# Patient Record
Sex: FEMALE | Race: WHITE | Employment: OTHER
[De-identification: names, ages, dates, MRNs, and addresses within clinical notes are randomized per-mention and may not be internally consistent; named-entity substitution may affect disease eponyms.]

---

## 2016-12-30 LAB
BASOPHILS ABSOLUTE: NORMAL /ΜL
BASOPHILS RELATIVE PERCENT: NORMAL %
EOSINOPHILS ABSOLUTE: NORMAL /ΜL
EOSINOPHILS RELATIVE PERCENT: NORMAL %
HCT VFR BLD CALC: 39.3 % (ref 36–46)
HEMOGLOBIN: 13 G/DL (ref 12–16)
LYMPHOCYTES ABSOLUTE: NORMAL /ΜL
LYMPHOCYTES RELATIVE PERCENT: NORMAL %
MCH RBC QN AUTO: NORMAL PG
MCHC RBC AUTO-ENTMCNC: NORMAL G/DL
MCV RBC AUTO: NORMAL FL
MONOCYTES ABSOLUTE: NORMAL /ΜL
MONOCYTES RELATIVE PERCENT: NORMAL %
NEUTROPHILS ABSOLUTE: NORMAL /ΜL
NEUTROPHILS RELATIVE PERCENT: NORMAL %
PDW BLD-RTO: NORMAL %
PLATELET # BLD: 228 K/ΜL
PMV BLD AUTO: NORMAL FL
RBC # BLD: 4.39 10^6/ΜL
WBC # BLD: 7.4 10^3/ML

## 2017-01-05 RX ORDER — LEVOTHYROXINE SODIUM 0.05 MG/1
TABLET ORAL
Qty: 30 TABLET | Refills: 5 | Status: SHIPPED | OUTPATIENT
Start: 2017-01-05 | End: 2017-01-23 | Stop reason: SDUPTHER

## 2017-01-23 DIAGNOSIS — R60.9 EDEMA, UNSPECIFIED TYPE: ICD-10-CM

## 2017-01-23 RX ORDER — HYDROCHLOROTHIAZIDE 25 MG/1
TABLET ORAL
Qty: 90 TABLET | Refills: 3 | Status: SHIPPED | OUTPATIENT
Start: 2017-01-23 | End: 2018-02-20 | Stop reason: SDUPTHER

## 2017-01-23 RX ORDER — OLMESARTAN MEDOXOMIL 40 MG/1
40 TABLET ORAL DAILY
Qty: 90 TABLET | Refills: 3 | Status: SHIPPED | OUTPATIENT
Start: 2017-01-23 | End: 2018-03-27 | Stop reason: SDUPTHER

## 2017-01-23 RX ORDER — LEVOTHYROXINE SODIUM 0.05 MG/1
50 TABLET ORAL DAILY
Qty: 90 TABLET | Refills: 3 | Status: SHIPPED | OUTPATIENT
Start: 2017-01-23 | End: 2018-03-27 | Stop reason: SDUPTHER

## 2017-02-03 RX ORDER — AZITHROMYCIN 250 MG/1
TABLET, FILM COATED ORAL
Qty: 1 PACKET | Refills: 0 | Status: SHIPPED | OUTPATIENT
Start: 2017-02-03 | End: 2017-02-13

## 2017-02-14 ENCOUNTER — TELEPHONE (OUTPATIENT)
Dept: INTERNAL MEDICINE | Facility: CLINIC | Age: 82
End: 2017-02-14

## 2017-02-15 ENCOUNTER — OFFICE VISIT (OUTPATIENT)
Dept: INTERNAL MEDICINE | Facility: CLINIC | Age: 82
End: 2017-02-15

## 2017-02-15 VITALS
HEIGHT: 58 IN | DIASTOLIC BLOOD PRESSURE: 82 MMHG | WEIGHT: 149 LBS | BODY MASS INDEX: 31.28 KG/M2 | SYSTOLIC BLOOD PRESSURE: 150 MMHG

## 2017-02-15 DIAGNOSIS — M05.79 RHEUMATOID ARTHRITIS INVOLVING MULTIPLE SITES WITH POSITIVE RHEUMATOID FACTOR (HCC): ICD-10-CM

## 2017-02-15 DIAGNOSIS — M25.552 PAIN OF LEFT HIP JOINT: Primary | ICD-10-CM

## 2017-02-15 DIAGNOSIS — I10 BENIGN ESSENTIAL HTN: ICD-10-CM

## 2017-02-15 PROCEDURE — 1123F ACP DISCUSS/DSCN MKR DOCD: CPT | Performed by: INTERNAL MEDICINE

## 2017-02-15 PROCEDURE — G8484 FLU IMMUNIZE NO ADMIN: HCPCS | Performed by: INTERNAL MEDICINE

## 2017-02-15 PROCEDURE — 1036F TOBACCO NON-USER: CPT | Performed by: INTERNAL MEDICINE

## 2017-02-15 PROCEDURE — G8417 CALC BMI ABV UP PARAM F/U: HCPCS | Performed by: INTERNAL MEDICINE

## 2017-02-15 PROCEDURE — 1090F PRES/ABSN URINE INCON ASSESS: CPT | Performed by: INTERNAL MEDICINE

## 2017-02-15 PROCEDURE — 4040F PNEUMOC VAC/ADMIN/RCVD: CPT | Performed by: INTERNAL MEDICINE

## 2017-02-15 PROCEDURE — G8399 PT W/DXA RESULTS DOCUMENT: HCPCS | Performed by: INTERNAL MEDICINE

## 2017-02-15 PROCEDURE — G8427 DOCREV CUR MEDS BY ELIG CLIN: HCPCS | Performed by: INTERNAL MEDICINE

## 2017-02-15 PROCEDURE — 99213 OFFICE O/P EST LOW 20 MIN: CPT | Performed by: INTERNAL MEDICINE

## 2017-02-26 PROBLEM — I10 BENIGN ESSENTIAL HTN: Status: ACTIVE | Noted: 2017-02-26

## 2017-03-13 ENCOUNTER — HOSPITAL ENCOUNTER (OUTPATIENT)
Age: 82
Discharge: HOME | End: 2017-03-13
Payer: MEDICARE

## 2017-03-13 ENCOUNTER — HOSPITAL ENCOUNTER (OUTPATIENT)
Dept: GENERAL RADIOLOGY | Age: 82
Discharge: HOME | End: 2017-03-13
Payer: MEDICARE

## 2017-03-13 DIAGNOSIS — M25.552 PAIN OF LEFT HIP JOINT: ICD-10-CM

## 2017-03-13 PROCEDURE — 73502 X-RAY EXAM HIP UNI 2-3 VIEWS: CPT

## 2017-03-15 ENCOUNTER — OFFICE VISIT (OUTPATIENT)
Dept: INTERNAL MEDICINE CLINIC | Age: 82
End: 2017-03-15
Payer: MEDICARE

## 2017-03-15 VITALS
HEIGHT: 58 IN | SYSTOLIC BLOOD PRESSURE: 132 MMHG | DIASTOLIC BLOOD PRESSURE: 82 MMHG | WEIGHT: 147 LBS | BODY MASS INDEX: 30.86 KG/M2

## 2017-03-15 DIAGNOSIS — T45.1X5A METHOTREXATE LUNG: ICD-10-CM

## 2017-03-15 DIAGNOSIS — E78.2 MIXED HYPERLIPIDEMIA: Primary | ICD-10-CM

## 2017-03-15 DIAGNOSIS — J98.4 METHOTREXATE LUNG: ICD-10-CM

## 2017-03-15 DIAGNOSIS — R05.9 COUGH: ICD-10-CM

## 2017-03-15 DIAGNOSIS — M05.79 RHEUMATOID ARTHRITIS INVOLVING MULTIPLE SITES WITH POSITIVE RHEUMATOID FACTOR (HCC): ICD-10-CM

## 2017-03-15 PROCEDURE — G8417 CALC BMI ABV UP PARAM F/U: HCPCS | Performed by: INTERNAL MEDICINE

## 2017-03-15 PROCEDURE — 4040F PNEUMOC VAC/ADMIN/RCVD: CPT | Performed by: INTERNAL MEDICINE

## 2017-03-15 PROCEDURE — G8484 FLU IMMUNIZE NO ADMIN: HCPCS | Performed by: INTERNAL MEDICINE

## 2017-03-15 PROCEDURE — G8399 PT W/DXA RESULTS DOCUMENT: HCPCS | Performed by: INTERNAL MEDICINE

## 2017-03-15 PROCEDURE — G8427 DOCREV CUR MEDS BY ELIG CLIN: HCPCS | Performed by: INTERNAL MEDICINE

## 2017-03-15 PROCEDURE — 1123F ACP DISCUSS/DSCN MKR DOCD: CPT | Performed by: INTERNAL MEDICINE

## 2017-03-15 PROCEDURE — 1036F TOBACCO NON-USER: CPT | Performed by: INTERNAL MEDICINE

## 2017-03-15 PROCEDURE — 1090F PRES/ABSN URINE INCON ASSESS: CPT | Performed by: INTERNAL MEDICINE

## 2017-03-15 PROCEDURE — 99213 OFFICE O/P EST LOW 20 MIN: CPT | Performed by: INTERNAL MEDICINE

## 2017-03-15 RX ORDER — ERGOCALCIFEROL 1.25 MG/1
CAPSULE ORAL
Refills: 0 | Status: ON HOLD | COMMUNITY
Start: 2017-03-02 | End: 2019-06-04 | Stop reason: HOSPADM

## 2017-03-15 RX ORDER — PROMETHAZINE HYDROCHLORIDE AND CODEINE PHOSPHATE 6.25; 1 MG/5ML; MG/5ML
SYRUP ORAL
Qty: 200 ML | Refills: 0 | Status: SHIPPED | OUTPATIENT
Start: 2017-03-15 | End: 2017-08-01 | Stop reason: SDUPTHER

## 2017-03-15 ASSESSMENT — PATIENT HEALTH QUESTIONNAIRE - PHQ9
1. LITTLE INTEREST OR PLEASURE IN DOING THINGS: 0
SUM OF ALL RESPONSES TO PHQ QUESTIONS 1-9: 0
2. FEELING DOWN, DEPRESSED OR HOPELESS: 0
SUM OF ALL RESPONSES TO PHQ9 QUESTIONS 1 & 2: 0

## 2017-06-06 ENCOUNTER — HOSPITAL ENCOUNTER (OUTPATIENT)
Dept: WOMENS IMAGING | Age: 82
Discharge: HOME | End: 2017-06-06
Payer: MEDICARE

## 2017-06-06 DIAGNOSIS — N64.4 PAINFUL BREASTS: ICD-10-CM

## 2017-06-06 PROCEDURE — 76642 ULTRASOUND BREAST LIMITED: CPT

## 2017-06-06 PROCEDURE — G0279 TOMOSYNTHESIS, MAMMO: HCPCS

## 2017-06-28 ENCOUNTER — OFFICE VISIT (OUTPATIENT)
Dept: INTERNAL MEDICINE CLINIC | Age: 82
End: 2017-06-28
Payer: MEDICARE

## 2017-06-28 VITALS
SYSTOLIC BLOOD PRESSURE: 124 MMHG | BODY MASS INDEX: 30.44 KG/M2 | DIASTOLIC BLOOD PRESSURE: 82 MMHG | HEIGHT: 58 IN | WEIGHT: 145 LBS

## 2017-06-28 DIAGNOSIS — M05.79 RHEUMATOID ARTHRITIS INVOLVING MULTIPLE SITES WITH POSITIVE RHEUMATOID FACTOR (HCC): ICD-10-CM

## 2017-06-28 DIAGNOSIS — I10 BENIGN ESSENTIAL HTN: ICD-10-CM

## 2017-06-28 DIAGNOSIS — E24.9 CUSHING SYNDROME (HCC): Primary | ICD-10-CM

## 2017-06-28 PROCEDURE — 1123F ACP DISCUSS/DSCN MKR DOCD: CPT | Performed by: INTERNAL MEDICINE

## 2017-06-28 PROCEDURE — G8399 PT W/DXA RESULTS DOCUMENT: HCPCS | Performed by: INTERNAL MEDICINE

## 2017-06-28 PROCEDURE — 1036F TOBACCO NON-USER: CPT | Performed by: INTERNAL MEDICINE

## 2017-06-28 PROCEDURE — 1090F PRES/ABSN URINE INCON ASSESS: CPT | Performed by: INTERNAL MEDICINE

## 2017-06-28 PROCEDURE — 4040F PNEUMOC VAC/ADMIN/RCVD: CPT | Performed by: INTERNAL MEDICINE

## 2017-06-28 PROCEDURE — 99213 OFFICE O/P EST LOW 20 MIN: CPT | Performed by: INTERNAL MEDICINE

## 2017-06-28 PROCEDURE — G8417 CALC BMI ABV UP PARAM F/U: HCPCS | Performed by: INTERNAL MEDICINE

## 2017-06-28 PROCEDURE — G8427 DOCREV CUR MEDS BY ELIG CLIN: HCPCS | Performed by: INTERNAL MEDICINE

## 2017-08-01 ENCOUNTER — OFFICE VISIT (OUTPATIENT)
Dept: INTERNAL MEDICINE CLINIC | Age: 82
End: 2017-08-01
Payer: MEDICARE

## 2017-08-01 VITALS
SYSTOLIC BLOOD PRESSURE: 119 MMHG | DIASTOLIC BLOOD PRESSURE: 72 MMHG | BODY MASS INDEX: 30.44 KG/M2 | HEIGHT: 58 IN | WEIGHT: 145 LBS

## 2017-08-01 DIAGNOSIS — Z09 FOLLOW UP: Primary | ICD-10-CM

## 2017-08-01 DIAGNOSIS — M17.11 ARTHRITIS OF RIGHT KNEE: ICD-10-CM

## 2017-08-01 DIAGNOSIS — R05.9 COUGH: ICD-10-CM

## 2017-08-01 PROCEDURE — G8399 PT W/DXA RESULTS DOCUMENT: HCPCS | Performed by: INTERNAL MEDICINE

## 2017-08-01 PROCEDURE — 1036F TOBACCO NON-USER: CPT | Performed by: INTERNAL MEDICINE

## 2017-08-01 PROCEDURE — 1090F PRES/ABSN URINE INCON ASSESS: CPT | Performed by: INTERNAL MEDICINE

## 2017-08-01 PROCEDURE — 1123F ACP DISCUSS/DSCN MKR DOCD: CPT | Performed by: INTERNAL MEDICINE

## 2017-08-01 PROCEDURE — G8427 DOCREV CUR MEDS BY ELIG CLIN: HCPCS | Performed by: INTERNAL MEDICINE

## 2017-08-01 PROCEDURE — G8417 CALC BMI ABV UP PARAM F/U: HCPCS | Performed by: INTERNAL MEDICINE

## 2017-08-01 PROCEDURE — 4040F PNEUMOC VAC/ADMIN/RCVD: CPT | Performed by: INTERNAL MEDICINE

## 2017-08-01 PROCEDURE — 20610 DRAIN/INJ JOINT/BURSA W/O US: CPT | Performed by: INTERNAL MEDICINE

## 2017-08-01 RX ORDER — BENZONATATE 100 MG/1
CAPSULE ORAL
Refills: 0 | COMMUNITY
Start: 2017-06-15 | End: 2018-03-05 | Stop reason: ALTCHOICE

## 2017-08-01 RX ORDER — PROMETHAZINE HYDROCHLORIDE AND CODEINE PHOSPHATE 6.25; 1 MG/5ML; MG/5ML
SYRUP ORAL
Qty: 200 ML | Refills: 0 | Status: SHIPPED | OUTPATIENT
Start: 2017-08-01 | End: 2018-03-05

## 2017-08-01 ASSESSMENT — PATIENT HEALTH QUESTIONNAIRE - PHQ9
1. LITTLE INTEREST OR PLEASURE IN DOING THINGS: 0
SUM OF ALL RESPONSES TO PHQ QUESTIONS 1-9: 0
SUM OF ALL RESPONSES TO PHQ9 QUESTIONS 1 & 2: 0
2. FEELING DOWN, DEPRESSED OR HOPELESS: 0

## 2017-08-13 RX ORDER — METHYLPREDNISOLONE ACETATE 80 MG/ML
40 INJECTION, SUSPENSION INTRA-ARTICULAR; INTRALESIONAL; INTRAMUSCULAR; SOFT TISSUE ONCE
Status: COMPLETED | OUTPATIENT
Start: 2017-08-13 | End: 2017-08-13

## 2017-08-13 RX ADMIN — METHYLPREDNISOLONE ACETATE 40 MG: 80 INJECTION, SUSPENSION INTRA-ARTICULAR; INTRALESIONAL; INTRAMUSCULAR; SOFT TISSUE at 22:58

## 2017-10-18 ENCOUNTER — OFFICE VISIT (OUTPATIENT)
Dept: INTERNAL MEDICINE CLINIC | Age: 82
End: 2017-10-18
Payer: MEDICARE

## 2017-10-18 VITALS
HEIGHT: 58 IN | BODY MASS INDEX: 29.6 KG/M2 | DIASTOLIC BLOOD PRESSURE: 82 MMHG | WEIGHT: 141 LBS | SYSTOLIC BLOOD PRESSURE: 126 MMHG

## 2017-10-18 DIAGNOSIS — E24.9 CUSHING SYNDROME (HCC): Primary | ICD-10-CM

## 2017-10-18 PROCEDURE — G8399 PT W/DXA RESULTS DOCUMENT: HCPCS | Performed by: INTERNAL MEDICINE

## 2017-10-18 PROCEDURE — 99213 OFFICE O/P EST LOW 20 MIN: CPT | Performed by: INTERNAL MEDICINE

## 2017-10-18 PROCEDURE — G8417 CALC BMI ABV UP PARAM F/U: HCPCS | Performed by: INTERNAL MEDICINE

## 2017-10-18 PROCEDURE — 1090F PRES/ABSN URINE INCON ASSESS: CPT | Performed by: INTERNAL MEDICINE

## 2017-10-18 PROCEDURE — 1036F TOBACCO NON-USER: CPT | Performed by: INTERNAL MEDICINE

## 2017-10-18 PROCEDURE — G8427 DOCREV CUR MEDS BY ELIG CLIN: HCPCS | Performed by: INTERNAL MEDICINE

## 2017-10-18 PROCEDURE — 4040F PNEUMOC VAC/ADMIN/RCVD: CPT | Performed by: INTERNAL MEDICINE

## 2017-10-18 PROCEDURE — G8484 FLU IMMUNIZE NO ADMIN: HCPCS | Performed by: INTERNAL MEDICINE

## 2017-10-18 PROCEDURE — 1123F ACP DISCUSS/DSCN MKR DOCD: CPT | Performed by: INTERNAL MEDICINE

## 2017-10-18 RX ORDER — HYDROCODONE POLISTIREX AND CHLORPHENIRAMINE POLISTIREX 10; 8 MG/5ML; MG/5ML
5 SUSPENSION, EXTENDED RELEASE ORAL PRN
Qty: 230 ML | Refills: 0 | Status: SHIPPED | OUTPATIENT
Start: 2017-10-18 | End: 2018-06-04 | Stop reason: SDUPTHER

## 2017-10-18 NOTE — PROGRESS NOTES
Subjective:      Patient ID: Pat Anne is a 80 y.o. female. Chronic Disease Visit Information    BP Readings from Last 3 Encounters:   08/01/17 119/72   06/28/17 124/82   03/15/17 132/82          BUN (mg/dL)   Date Value   11/21/2013 23 (H)     CREATININE (no units)   Date Value   01/02/2015 0.6     Glucose (mg/dL)   Date Value   10/11/2011 86            Have you changed or started any medications since your last visit including any over-the-counter medicines, vitamins, or herbal medicines? no   Are you having any side effects from any of your medications? -  no  Have you stopped taking any of your medications? Is so, why? -  no    Have you seen any other physician or provider since your last visit? No  Have you had any other diagnostic tests since your last visit? No  Have you been seen in the emergency room and/or had an admission to a hospital since we last saw you? No  Have you had your annual diabetic retinal (eye) exam? No  Have you had your routine dental cleaning in the past 6 months? no    Have you activated your [employer] account? If not, what are your barriers? No: declined     Patient Care Team:  Juno Bertrand MD as PCP - General (Internal Medicine)         Medical History Review  Past Medical, Family, and Social History reviewed and does contribute to the patient presenting condition    Health Maintenance   Topic Date Due    DTaP/Tdap/Td vaccine (1 - Tdap) 01/02/1954    Zostavax vaccine  01/02/1995    Flu vaccine (1) 09/01/2017    DEXA (modify frequency per FRAX score)  Completed    Pneumococcal low/med risk  Completed       HPI    Review of Systems    Objective:   Physical Exam    Assessment:      No diagnosis found.         Plan:

## 2017-11-14 ENCOUNTER — HOSPITAL ENCOUNTER (OUTPATIENT)
Age: 82
Discharge: HOME | End: 2017-11-14
Payer: MEDICARE

## 2017-11-14 ENCOUNTER — HOSPITAL ENCOUNTER (OUTPATIENT)
Dept: GENERAL RADIOLOGY | Age: 82
Discharge: HOME | End: 2017-11-14
Payer: MEDICARE

## 2017-11-14 DIAGNOSIS — J90 PLEURAL EFFUSION: ICD-10-CM

## 2017-11-14 PROCEDURE — 71020 XR CHEST STANDARD TWO VW: CPT

## 2017-12-04 ENCOUNTER — HOSPITAL ENCOUNTER (OUTPATIENT)
Dept: CT IMAGING | Age: 82
Discharge: HOME | End: 2017-12-04
Payer: MEDICARE

## 2017-12-04 DIAGNOSIS — J84.10 PULMONARY FIBROSIS (HCC): ICD-10-CM

## 2017-12-04 PROCEDURE — 71250 CT THORAX DX C-: CPT

## 2017-12-07 ENCOUNTER — OFFICE VISIT (OUTPATIENT)
Dept: INTERNAL MEDICINE CLINIC | Age: 82
End: 2017-12-07
Payer: MEDICARE

## 2017-12-07 VITALS
HEIGHT: 58 IN | WEIGHT: 144 LBS | BODY MASS INDEX: 30.23 KG/M2 | SYSTOLIC BLOOD PRESSURE: 150 MMHG | DIASTOLIC BLOOD PRESSURE: 90 MMHG

## 2017-12-07 DIAGNOSIS — I10 BENIGN ESSENTIAL HTN: ICD-10-CM

## 2017-12-07 DIAGNOSIS — J98.4 METHOTREXATE LUNG: ICD-10-CM

## 2017-12-07 DIAGNOSIS — T45.1X5A METHOTREXATE LUNG: ICD-10-CM

## 2017-12-07 DIAGNOSIS — L08.9 INFECTED SEBACEOUS CYST: Primary | ICD-10-CM

## 2017-12-07 DIAGNOSIS — L72.3 INFECTED SEBACEOUS CYST: Primary | ICD-10-CM

## 2017-12-07 PROCEDURE — G8399 PT W/DXA RESULTS DOCUMENT: HCPCS | Performed by: INTERNAL MEDICINE

## 2017-12-07 PROCEDURE — G8427 DOCREV CUR MEDS BY ELIG CLIN: HCPCS | Performed by: INTERNAL MEDICINE

## 2017-12-07 PROCEDURE — 1036F TOBACCO NON-USER: CPT | Performed by: INTERNAL MEDICINE

## 2017-12-07 PROCEDURE — 4040F PNEUMOC VAC/ADMIN/RCVD: CPT | Performed by: INTERNAL MEDICINE

## 2017-12-07 PROCEDURE — G8417 CALC BMI ABV UP PARAM F/U: HCPCS | Performed by: INTERNAL MEDICINE

## 2017-12-07 PROCEDURE — 1123F ACP DISCUSS/DSCN MKR DOCD: CPT | Performed by: INTERNAL MEDICINE

## 2017-12-07 PROCEDURE — 99213 OFFICE O/P EST LOW 20 MIN: CPT | Performed by: INTERNAL MEDICINE

## 2017-12-07 PROCEDURE — 1090F PRES/ABSN URINE INCON ASSESS: CPT | Performed by: INTERNAL MEDICINE

## 2017-12-07 PROCEDURE — G8484 FLU IMMUNIZE NO ADMIN: HCPCS | Performed by: INTERNAL MEDICINE

## 2017-12-07 RX ORDER — CLINDAMYCIN HYDROCHLORIDE 300 MG/1
300 CAPSULE ORAL 3 TIMES DAILY
Qty: 21 CAPSULE | Refills: 0 | Status: SHIPPED | OUTPATIENT
Start: 2017-12-07 | End: 2018-09-13 | Stop reason: SDUPTHER

## 2017-12-07 RX ORDER — FUROSEMIDE 20 MG/1
20 TABLET ORAL DAILY
Qty: 60 TABLET | Refills: 3 | Status: SHIPPED | OUTPATIENT
Start: 2017-12-07 | End: 2019-03-25 | Stop reason: SDUPTHER

## 2017-12-07 NOTE — PROGRESS NOTES
(carpal tunnel syndrome)     Esophageal reflux     Headache(784.0)     Hyperlipidemia     Hypertension     Hypothyroidism     Osteoarthritis     Osteoporosis     Rheumatoid arthritis(714.0)      Social History   Substance Use Topics    Smoking status: Never Smoker    Smokeless tobacco: Never Used    Alcohol use No       ROS  [facility] no chest pain no sob no orthopnea  Resp no cough   General no weight loss no fatigue no fever      Objective:   Physical Exam    Blood pressure (!) 150/90, height 4' 9.87\" (1.47 m), weight 144 lb (65.3 kg), not currently breastfeeding. General Appearance NAD conversant  Neck FROM supple no JVD  Lungs normal effort Clear to auscultation  Cardio regular rhythm no murmur  Abdomen Soft nontender no HSM  Ext no edema no cyanosis no clubbing   Skin left shoulder area open abscess with pus  Neuro no focal deficits   Musculoskeletal no spinal tenderness +  kyphosis     Assessment:      1. Infected sebaceous cyst  clindamycin (CLEOCIN) 300 MG capsule   2. Benign essential HTN     3.  Methotrexate lung (HCC)           Plan:    pt lung disease has been stable BP controlled    complete clinda for sebaceous cyst infection

## 2018-01-19 ENCOUNTER — OFFICE VISIT (OUTPATIENT)
Dept: INTERNAL MEDICINE CLINIC | Age: 83
End: 2018-01-19
Payer: MEDICARE

## 2018-01-19 VITALS
DIASTOLIC BLOOD PRESSURE: 74 MMHG | SYSTOLIC BLOOD PRESSURE: 126 MMHG | WEIGHT: 142 LBS | BODY MASS INDEX: 29.81 KG/M2 | HEIGHT: 58 IN

## 2018-01-19 DIAGNOSIS — M05.79 RHEUMATOID ARTHRITIS INVOLVING MULTIPLE SITES WITH POSITIVE RHEUMATOID FACTOR (HCC): ICD-10-CM

## 2018-01-19 DIAGNOSIS — E24.9 CUSHING'S SYNDROME (HCC): ICD-10-CM

## 2018-01-19 PROCEDURE — 99213 OFFICE O/P EST LOW 20 MIN: CPT | Performed by: INTERNAL MEDICINE

## 2018-01-19 PROCEDURE — 1123F ACP DISCUSS/DSCN MKR DOCD: CPT | Performed by: INTERNAL MEDICINE

## 2018-01-19 PROCEDURE — G8417 CALC BMI ABV UP PARAM F/U: HCPCS | Performed by: INTERNAL MEDICINE

## 2018-01-19 PROCEDURE — G8484 FLU IMMUNIZE NO ADMIN: HCPCS | Performed by: INTERNAL MEDICINE

## 2018-01-19 PROCEDURE — 4040F PNEUMOC VAC/ADMIN/RCVD: CPT | Performed by: INTERNAL MEDICINE

## 2018-01-19 PROCEDURE — 1090F PRES/ABSN URINE INCON ASSESS: CPT | Performed by: INTERNAL MEDICINE

## 2018-01-19 PROCEDURE — G8399 PT W/DXA RESULTS DOCUMENT: HCPCS | Performed by: INTERNAL MEDICINE

## 2018-01-19 PROCEDURE — 1036F TOBACCO NON-USER: CPT | Performed by: INTERNAL MEDICINE

## 2018-01-19 PROCEDURE — G8427 DOCREV CUR MEDS BY ELIG CLIN: HCPCS | Performed by: INTERNAL MEDICINE

## 2018-01-19 NOTE — PROGRESS NOTES
Subjective:      Patient ID: Dante Thompson is a 80 y.o. female. Chronic Disease Visit Information    BP Readings from Last 3 Encounters:   12/07/17 (!) 150/90   10/18/17 126/82   08/01/17 119/72          BUN (mg/dL)   Date Value   11/21/2013 23 (H)     CREATININE (no units)   Date Value   01/02/2015 0.6     Glucose (mg/dL)   Date Value   10/11/2011 86            Have you changed or started any medications since your last visit including any over-the-counter medicines, vitamins, or herbal medicines? no   Are you having any side effects from any of your medications? -  no  Have you stopped taking any of your medications? Is so, why? -  no    Have you seen any other physician or provider since your last visit? Yes - Records Requested  Have you had any other diagnostic tests since your last visit? No  Have you been seen in the emergency room and/or had an admission to a hospital since we last saw you? No  Have you had your annual diabetic retinal (eye) exam? No  Have you had your routine dental cleaning in the past 6 months? no    Have you activated your [employer] account? If not, what are your barriers? No:      Patient Care Team:  Gabino Souza MD as PCP - General (Internal Medicine)  Gabino Souza MD as PCP - S Attributed Provider         Medical History Review  Past Medical, Family, and Social History reviewed and does contribute to the patient presenting condition    Health Maintenance   Topic Date Due    TSH testing  1935    Zostavax vaccine  01/02/1995    Potassium monitoring  11/15/2012    Creatinine monitoring  01/02/2016    DTaP/Tdap/Td vaccine (1 - Tdap) 12/06/2018 (Originally 1/2/1954)    DEXA (modify frequency per FRAX score)  Completed    Flu vaccine  Completed    Pneumococcal low/med risk  Completed       HPI   Chief Complaint   Patient presents with    Cyst     left shoulder.  pt has finished Clindamycin     Pt completed clindamycin and sympts resolved    Arthritis has been stable   On arava and leflunomide   Review of Systems   Past Medical History:   Diagnosis Date    Allergic rhinitis     Anemia     Anxiety     Asthma     Cataract     COPD (chronic obstructive pulmonary disease) (HCC)     CTS (carpal tunnel syndrome)     Esophageal reflux     Headache(784.0)     Hyperlipidemia     Hypertension     Hypothyroidism     Osteoarthritis     Osteoporosis     Rheumatoid arthritis(714.0)      Social History   Substance Use Topics    Smoking status: Never Smoker    Smokeless tobacco: Never Used    Alcohol use No       ROS  [facility]S no chest pain no sob no orthopnea  Resp no cough   General no weight loss no fatigue no fever        Objective:   Physical Exam    Blood pressure 126/74, height 4' 9.87\" (1.47 m), weight 142 lb (64.4 kg), not currently breastfeeding. General Appearance NAD conversant  Neck FROM supple no JVD  Lungs normal effort Clear to auscultation  Cardio regular rhythm no murmur  Abdomen Soft nontender no HSM  Ext no edema no cyanosis no clubbing   Skin no rashes no ulcers  Neuro no focal deficits   Musculoskeletal + kyphosis changes both hands     Assessment:      1. Rheumatoid arthritis involving multiple sites with positive rheumatoid factor (HCC)  Stable control well has f/u rheumatology    2.  Cushing's syndrome (HCC)  Stable not on steroids anymore    Sebaceous cyst resolved         Plan:

## 2018-02-19 DIAGNOSIS — R60.9 EDEMA, UNSPECIFIED TYPE: ICD-10-CM

## 2018-02-19 NOTE — TELEPHONE ENCOUNTER
Medication: hctz  Last visit: 01/19/18  Next visit: 3/5/2018  Last refill: 01/23/17  [facility] [address]

## 2018-02-20 DIAGNOSIS — R60.9 EDEMA, UNSPECIFIED TYPE: ICD-10-CM

## 2018-02-20 RX ORDER — HYDROCHLOROTHIAZIDE 25 MG/1
TABLET ORAL
Qty: 90 TABLET | Refills: 3 | Status: SHIPPED | OUTPATIENT
Start: 2018-02-20 | End: 2018-02-21 | Stop reason: SDUPTHER

## 2018-02-20 RX ORDER — HYDROCHLOROTHIAZIDE 25 MG/1
TABLET ORAL
Qty: 90 TABLET | Refills: 3 | OUTPATIENT
Start: 2018-02-20

## 2018-02-21 DIAGNOSIS — R60.9 EDEMA, UNSPECIFIED TYPE: ICD-10-CM

## 2018-02-21 RX ORDER — HYDROCHLOROTHIAZIDE 25 MG/1
TABLET ORAL
Qty: 90 TABLET | Refills: 2 | Status: SHIPPED | OUTPATIENT
Start: 2018-02-21 | End: 2019-01-02 | Stop reason: SDUPTHER

## 2018-03-05 ENCOUNTER — OFFICE VISIT (OUTPATIENT)
Dept: INTERNAL MEDICINE CLINIC | Age: 83
End: 2018-03-05
Payer: MEDICARE

## 2018-03-05 VITALS
BODY MASS INDEX: 30.23 KG/M2 | HEIGHT: 58 IN | WEIGHT: 144 LBS | SYSTOLIC BLOOD PRESSURE: 126 MMHG | DIASTOLIC BLOOD PRESSURE: 74 MMHG

## 2018-03-05 DIAGNOSIS — J98.4 METHOTREXATE LUNG: Primary | ICD-10-CM

## 2018-03-05 DIAGNOSIS — T45.1X5A METHOTREXATE LUNG: Primary | ICD-10-CM

## 2018-03-05 DIAGNOSIS — E24.9 CUSHING'S SYNDROME (HCC): ICD-10-CM

## 2018-03-05 DIAGNOSIS — R05.9 COUGH: ICD-10-CM

## 2018-03-05 DIAGNOSIS — M05.79 RHEUMATOID ARTHRITIS INVOLVING MULTIPLE SITES WITH POSITIVE RHEUMATOID FACTOR (HCC): ICD-10-CM

## 2018-03-05 PROCEDURE — 1123F ACP DISCUSS/DSCN MKR DOCD: CPT | Performed by: INTERNAL MEDICINE

## 2018-03-05 PROCEDURE — 99213 OFFICE O/P EST LOW 20 MIN: CPT | Performed by: INTERNAL MEDICINE

## 2018-03-05 PROCEDURE — G8417 CALC BMI ABV UP PARAM F/U: HCPCS | Performed by: INTERNAL MEDICINE

## 2018-03-05 PROCEDURE — 4040F PNEUMOC VAC/ADMIN/RCVD: CPT | Performed by: INTERNAL MEDICINE

## 2018-03-05 PROCEDURE — 1036F TOBACCO NON-USER: CPT | Performed by: INTERNAL MEDICINE

## 2018-03-05 PROCEDURE — G8427 DOCREV CUR MEDS BY ELIG CLIN: HCPCS | Performed by: INTERNAL MEDICINE

## 2018-03-05 PROCEDURE — G8399 PT W/DXA RESULTS DOCUMENT: HCPCS | Performed by: INTERNAL MEDICINE

## 2018-03-05 PROCEDURE — G8482 FLU IMMUNIZE ORDER/ADMIN: HCPCS | Performed by: INTERNAL MEDICINE

## 2018-03-05 PROCEDURE — 1090F PRES/ABSN URINE INCON ASSESS: CPT | Performed by: INTERNAL MEDICINE

## 2018-03-05 RX ORDER — NEOMYCIN SULFATE, POLYMYXIN B SULFATE, AND DEXAMETHASONE 3.5; 10000; 1 MG/G; [USP'U]/G; MG/G
OINTMENT OPHTHALMIC
Refills: 0 | COMMUNITY
Start: 2018-02-01

## 2018-03-05 RX ORDER — PROMETHAZINE HYDROCHLORIDE AND CODEINE PHOSPHATE 6.25; 1 MG/5ML; MG/5ML
SYRUP ORAL
Qty: 200 ML | Refills: 0 | Status: SHIPPED | OUTPATIENT
Start: 2018-03-05 | End: 2018-06-04 | Stop reason: SDUPTHER

## 2018-03-05 RX ORDER — FLUTICASONE PROPIONATE 50 MCG
SPRAY, SUSPENSION (ML) NASAL
Refills: 0 | COMMUNITY
Start: 2018-02-18 | End: 2019-06-13

## 2018-03-05 RX ORDER — OXYCODONE AND ACETAMINOPHEN 10; 325 MG/1; MG/1
TABLET ORAL
Refills: 0 | Status: ON HOLD | COMMUNITY
Start: 2017-12-14 | End: 2019-05-20 | Stop reason: HOSPADM

## 2018-03-27 RX ORDER — OLMESARTAN MEDOXOMIL 40 MG/1
TABLET ORAL
Qty: 90 TABLET | Refills: 2 | Status: SHIPPED | OUTPATIENT
Start: 2018-03-27 | End: 2018-12-06 | Stop reason: SDUPTHER

## 2018-03-27 RX ORDER — LEVOTHYROXINE SODIUM 0.05 MG/1
50 TABLET ORAL DAILY
Qty: 90 TABLET | Refills: 2 | Status: SHIPPED | OUTPATIENT
Start: 2018-03-27 | End: 2018-12-06 | Stop reason: SDUPTHER

## 2018-06-04 ENCOUNTER — OFFICE VISIT (OUTPATIENT)
Dept: INTERNAL MEDICINE CLINIC | Age: 83
End: 2018-06-04
Payer: MEDICARE

## 2018-06-04 ENCOUNTER — TELEPHONE (OUTPATIENT)
Dept: INTERNAL MEDICINE CLINIC | Age: 83
End: 2018-06-04

## 2018-06-04 VITALS
WEIGHT: 143.96 LBS | SYSTOLIC BLOOD PRESSURE: 126 MMHG | HEIGHT: 58 IN | BODY MASS INDEX: 30.22 KG/M2 | DIASTOLIC BLOOD PRESSURE: 80 MMHG

## 2018-06-04 DIAGNOSIS — E24.9 CUSHING SYNDROME (HCC): ICD-10-CM

## 2018-06-04 DIAGNOSIS — T45.1X5A METHOTREXATE LUNG: ICD-10-CM

## 2018-06-04 DIAGNOSIS — E03.9 HYPOTHYROIDISM, UNSPECIFIED TYPE: Primary | ICD-10-CM

## 2018-06-04 DIAGNOSIS — J98.4 METHOTREXATE LUNG: ICD-10-CM

## 2018-06-04 DIAGNOSIS — K21.9 GASTROESOPHAGEAL REFLUX DISEASE WITHOUT ESOPHAGITIS: ICD-10-CM

## 2018-06-04 DIAGNOSIS — E78.2 MIXED HYPERLIPIDEMIA: ICD-10-CM

## 2018-06-04 DIAGNOSIS — R05.9 COUGH: ICD-10-CM

## 2018-06-04 DIAGNOSIS — E78.2 MIXED HYPERLIPIDEMIA: Primary | ICD-10-CM

## 2018-06-04 PROCEDURE — 99213 OFFICE O/P EST LOW 20 MIN: CPT | Performed by: INTERNAL MEDICINE

## 2018-06-04 PROCEDURE — G8417 CALC BMI ABV UP PARAM F/U: HCPCS | Performed by: INTERNAL MEDICINE

## 2018-06-04 PROCEDURE — 1090F PRES/ABSN URINE INCON ASSESS: CPT | Performed by: INTERNAL MEDICINE

## 2018-06-04 PROCEDURE — 1036F TOBACCO NON-USER: CPT | Performed by: INTERNAL MEDICINE

## 2018-06-04 PROCEDURE — 1123F ACP DISCUSS/DSCN MKR DOCD: CPT | Performed by: INTERNAL MEDICINE

## 2018-06-04 PROCEDURE — G8399 PT W/DXA RESULTS DOCUMENT: HCPCS | Performed by: INTERNAL MEDICINE

## 2018-06-04 PROCEDURE — G8427 DOCREV CUR MEDS BY ELIG CLIN: HCPCS | Performed by: INTERNAL MEDICINE

## 2018-06-04 PROCEDURE — 4040F PNEUMOC VAC/ADMIN/RCVD: CPT | Performed by: INTERNAL MEDICINE

## 2018-06-04 RX ORDER — HYDROCODONE POLISTIREX AND CHLORPHENIRAMINE POLISTIREX 10; 8 MG/5ML; MG/5ML
5 SUSPENSION, EXTENDED RELEASE ORAL PRN
Qty: 230 ML | Refills: 0 | Status: SHIPPED | OUTPATIENT
Start: 2018-06-04 | End: 2018-07-04

## 2018-06-04 RX ORDER — PROMETHAZINE HYDROCHLORIDE AND CODEINE PHOSPHATE 6.25; 1 MG/5ML; MG/5ML
SYRUP ORAL
Qty: 200 ML | Refills: 0 | Status: SHIPPED | OUTPATIENT
Start: 2018-06-04 | End: 2018-06-05

## 2018-06-04 ASSESSMENT — PATIENT HEALTH QUESTIONNAIRE - PHQ9
SUM OF ALL RESPONSES TO PHQ QUESTIONS 1-9: 0
SUM OF ALL RESPONSES TO PHQ9 QUESTIONS 1 & 2: 0
2. FEELING DOWN, DEPRESSED OR HOPELESS: 0
1. LITTLE INTEREST OR PLEASURE IN DOING THINGS: 0

## 2018-06-18 ENCOUNTER — HOSPITAL ENCOUNTER (OUTPATIENT)
Age: 83
Setting detail: SPECIMEN
Discharge: HOME | End: 2018-06-18
Payer: MEDICARE

## 2018-06-18 DIAGNOSIS — E03.9 HYPOTHYROIDISM, UNSPECIFIED TYPE: ICD-10-CM

## 2018-06-18 LAB
ABSOLUTE EOS #: 0.17 K/UL (ref 0–0.44)
ABSOLUTE IMMATURE GRANULOCYTE: <0.03 K/UL (ref 0–0.3)
ABSOLUTE LYMPH #: 2.22 K/UL (ref 1.1–3.7)
ABSOLUTE MONO #: 0.69 K/UL (ref 0.1–1.2)
ANION GAP SERPL CALCULATED.3IONS-SCNC: 15 MMOL/L (ref 9–17)
BASOPHILS # BLD: 1 % (ref 0–2)
BASOPHILS ABSOLUTE: 0.07 K/UL (ref 0–0.2)
BUN BLDV-MCNC: 27 MG/DL (ref 8–23)
BUN/CREAT BLD: ABNORMAL (ref 9–20)
CALCIUM SERPL-MCNC: 9 MG/DL (ref 8.6–10.4)
CHLORIDE BLD-SCNC: 104 MMOL/L (ref 98–107)
CO2: 23 MMOL/L (ref 20–31)
CREAT SERPL-MCNC: 0.62 MG/DL (ref 0.5–0.9)
DIFFERENTIAL TYPE: ABNORMAL
EOSINOPHILS RELATIVE PERCENT: 3 % (ref 1–4)
GFR AFRICAN AMERICAN: >60 ML/MIN
GFR NON-AFRICAN AMERICAN: >60 ML/MIN
GFR SERPL CREATININE-BSD FRML MDRD: ABNORMAL ML/MIN/{1.73_M2}
GFR SERPL CREATININE-BSD FRML MDRD: ABNORMAL ML/MIN/{1.73_M2}
GLUCOSE BLD-MCNC: 94 MG/DL (ref 70–99)
HCT VFR BLD CALC: 41.5 % (ref 36.3–47.1)
HEMOGLOBIN: 12.8 G/DL (ref 11.9–15.1)
IMMATURE GRANULOCYTES: 0 %
LYMPHOCYTES # BLD: 35 % (ref 24–43)
MCH RBC QN AUTO: 29.2 PG (ref 25.2–33.5)
MCHC RBC AUTO-ENTMCNC: 30.8 G/DL (ref 28.4–34.8)
MCV RBC AUTO: 94.5 FL (ref 82.6–102.9)
MONOCYTES # BLD: 11 % (ref 3–12)
NRBC AUTOMATED: 0 PER 100 WBC
PDW BLD-RTO: 14.6 % (ref 11.8–14.4)
PLATELET # BLD: 223 K/UL (ref 138–453)
PLATELET ESTIMATE: ABNORMAL
PMV BLD AUTO: 11.4 FL (ref 8.1–13.5)
POTASSIUM SERPL-SCNC: 3.9 MMOL/L (ref 3.7–5.3)
RBC # BLD: 4.39 M/UL (ref 3.95–5.11)
RBC # BLD: ABNORMAL 10*6/UL
SEG NEUTROPHILS: 50 % (ref 36–65)
SEGMENTED NEUTROPHILS ABSOLUTE COUNT: 3.21 K/UL (ref 1.5–8.1)
SODIUM BLD-SCNC: 142 MMOL/L (ref 135–144)
TSH SERPL DL<=0.05 MIU/L-ACNC: 3.84 MIU/L (ref 0.3–5)
VITAMIN B-12: 1424 PG/ML (ref 232–1245)
WBC # BLD: 6.4 K/UL (ref 3.5–11.3)
WBC # BLD: ABNORMAL 10*3/UL

## 2018-09-13 ENCOUNTER — TELEPHONE (OUTPATIENT)
Dept: INTERNAL MEDICINE CLINIC | Age: 83
End: 2018-09-13

## 2018-09-13 DIAGNOSIS — L72.3 INFECTED SEBACEOUS CYST: ICD-10-CM

## 2018-09-13 DIAGNOSIS — L08.9 INFECTED SEBACEOUS CYST: ICD-10-CM

## 2018-09-13 RX ORDER — CLINDAMYCIN HYDROCHLORIDE 300 MG/1
300 CAPSULE ORAL 3 TIMES DAILY
Qty: 21 CAPSULE | Refills: 0 | Status: SHIPPED | OUTPATIENT
Start: 2018-09-13 | End: 2018-09-20

## 2018-10-15 ENCOUNTER — TELEPHONE (OUTPATIENT)
Dept: INTERNAL MEDICINE CLINIC | Age: 83
End: 2018-10-15

## 2018-10-16 RX ORDER — METHYLPREDNISOLONE 4 MG/1
TABLET ORAL
Qty: 1 KIT | Refills: 0 | Status: SHIPPED | OUTPATIENT
Start: 2018-10-16 | End: 2018-10-22

## 2018-10-22 ENCOUNTER — OFFICE VISIT (OUTPATIENT)
Dept: INTERNAL MEDICINE CLINIC | Age: 83
End: 2018-10-22
Payer: MEDICARE

## 2018-10-22 VITALS
DIASTOLIC BLOOD PRESSURE: 78 MMHG | SYSTOLIC BLOOD PRESSURE: 130 MMHG | WEIGHT: 140 LBS | HEIGHT: 58 IN | BODY MASS INDEX: 29.39 KG/M2

## 2018-10-22 DIAGNOSIS — I10 ESSENTIAL HYPERTENSION: ICD-10-CM

## 2018-10-22 DIAGNOSIS — M05.79 RHEUMATOID ARTHRITIS INVOLVING MULTIPLE SITES WITH POSITIVE RHEUMATOID FACTOR (HCC): ICD-10-CM

## 2018-10-22 DIAGNOSIS — E03.9 HYPOTHYROIDISM, UNSPECIFIED TYPE: Primary | ICD-10-CM

## 2018-10-22 DIAGNOSIS — M17.0 PRIMARY OSTEOARTHRITIS OF BOTH KNEES: ICD-10-CM

## 2018-10-22 PROCEDURE — 1090F PRES/ABSN URINE INCON ASSESS: CPT | Performed by: INTERNAL MEDICINE

## 2018-10-22 PROCEDURE — 1101F PT FALLS ASSESS-DOCD LE1/YR: CPT | Performed by: INTERNAL MEDICINE

## 2018-10-22 PROCEDURE — G8484 FLU IMMUNIZE NO ADMIN: HCPCS | Performed by: INTERNAL MEDICINE

## 2018-10-22 PROCEDURE — 1123F ACP DISCUSS/DSCN MKR DOCD: CPT | Performed by: INTERNAL MEDICINE

## 2018-10-22 PROCEDURE — G8427 DOCREV CUR MEDS BY ELIG CLIN: HCPCS | Performed by: INTERNAL MEDICINE

## 2018-10-22 PROCEDURE — 1036F TOBACCO NON-USER: CPT | Performed by: INTERNAL MEDICINE

## 2018-10-22 PROCEDURE — G8399 PT W/DXA RESULTS DOCUMENT: HCPCS | Performed by: INTERNAL MEDICINE

## 2018-10-22 PROCEDURE — G8417 CALC BMI ABV UP PARAM F/U: HCPCS | Performed by: INTERNAL MEDICINE

## 2018-10-22 PROCEDURE — 4040F PNEUMOC VAC/ADMIN/RCVD: CPT | Performed by: INTERNAL MEDICINE

## 2018-10-22 PROCEDURE — 99213 OFFICE O/P EST LOW 20 MIN: CPT | Performed by: INTERNAL MEDICINE

## 2018-10-22 RX ORDER — METHYLPREDNISOLONE 4 MG/1
TABLET ORAL
Qty: 1 KIT | Refills: 0 | Status: SHIPPED | OUTPATIENT
Start: 2018-10-22 | End: 2019-02-12 | Stop reason: SDUPTHER

## 2018-10-22 RX ORDER — PREDNISONE 1 MG/1
5 TABLET ORAL DAILY
Qty: 10 TABLET | Refills: 0 | Status: CANCELLED | OUTPATIENT
Start: 2018-10-22 | End: 2018-11-01

## 2018-10-22 ASSESSMENT — PATIENT HEALTH QUESTIONNAIRE - PHQ9
SUM OF ALL RESPONSES TO PHQ QUESTIONS 1-9: 0
2. FEELING DOWN, DEPRESSED OR HOPELESS: 0
SUM OF ALL RESPONSES TO PHQ QUESTIONS 1-9: 0
1. LITTLE INTEREST OR PLEASURE IN DOING THINGS: 0
SUM OF ALL RESPONSES TO PHQ9 QUESTIONS 1 & 2: 0

## 2018-10-22 NOTE — PROGRESS NOTES
Subjective:      Patient ID: Petey Winkler is a 80 y.o. female   Visit Information    Have you changed or started any medications since your last visit including any over-the-counter medicines, vitamins, or herbal medicines? no   Are you having any side effects from any of your medications? -  no  Have you stopped taking any of your medications? Is so, why? -  no    Have you seen any other physician or provider since your last visit? No  Have you had any other diagnostic tests since your last visit? No  Have you been seen in the emergency room and/or had an admission to a hospital since we last saw you? No  Have you had your routine dental cleaning in the past 6 months? no    Have you activated your [employer] account? If not, what are your barriers? No:      Patient Care Team:  Bettye Palacios MD as PCP - General (Internal Medicine)  Bettye Palacios MD as PCP - S Attributed Provider     Chief Complaint   Patient presents with    Back Pain     pain in mid to low right side of back. pt states she picked up a case of water about 2 months ago. Back is still hurting.         Medical History Review  Past Medical, Family, and Social History reviewed and does contribute to the patient presenting condition    Health Maintenance   Topic Date Due    Flu vaccine (1) 11/22/2018 (Originally 9/1/2018)    DTaP/Tdap/Td vaccine (1 - Tdap) 12/06/2018 (Originally 1[birth year]2/1954)    Shingles Vaccine (1 of 2 - 2 Dose Series) 10/23/2019 (Originally 1/2/1985)    TSH testing  06/18/2019    Potassium monitoring  06/18/2019    Creatinine monitoring  06/18/2019    DEXA (modify frequency per FRAX score)  Completed    Pneumococcal low/med risk  Completed       HPI    Review of Systems    Objective:   Physical Exam    Assessment:            Plan:              Bettye Palacios MD

## 2018-12-07 RX ORDER — OLMESARTAN MEDOXOMIL 40 MG/1
40 TABLET ORAL DAILY
Qty: 90 TABLET | Refills: 3 | Status: ON HOLD | OUTPATIENT
Start: 2018-12-07 | End: 2019-06-04 | Stop reason: HOSPADM

## 2018-12-07 RX ORDER — LEVOTHYROXINE SODIUM 0.05 MG/1
50 TABLET ORAL DAILY
Qty: 90 TABLET | Refills: 3 | Status: ON HOLD | OUTPATIENT
Start: 2018-12-07 | End: 2019-06-04 | Stop reason: HOSPADM

## 2018-12-10 ENCOUNTER — TELEPHONE (OUTPATIENT)
Dept: INTERNAL MEDICINE CLINIC | Age: 83
End: 2018-12-10

## 2018-12-10 RX ORDER — PREDNISONE 20 MG/1
20 TABLET ORAL DAILY
Qty: 7 TABLET | Refills: 0 | Status: SHIPPED | OUTPATIENT
Start: 2018-12-10 | End: 2018-12-14 | Stop reason: SDUPTHER

## 2018-12-14 ENCOUNTER — OFFICE VISIT (OUTPATIENT)
Dept: INTERNAL MEDICINE CLINIC | Age: 83
End: 2018-12-14
Payer: MEDICARE

## 2018-12-14 VITALS
WEIGHT: 136 LBS | SYSTOLIC BLOOD PRESSURE: 130 MMHG | HEIGHT: 58 IN | DIASTOLIC BLOOD PRESSURE: 80 MMHG | BODY MASS INDEX: 28.55 KG/M2

## 2018-12-14 DIAGNOSIS — I10 BENIGN ESSENTIAL HTN: ICD-10-CM

## 2018-12-14 DIAGNOSIS — M25.562 ARTHRALGIA OF BOTH KNEES: ICD-10-CM

## 2018-12-14 DIAGNOSIS — M05.79 RHEUMATOID ARTHRITIS INVOLVING MULTIPLE SITES WITH POSITIVE RHEUMATOID FACTOR (HCC): Primary | ICD-10-CM

## 2018-12-14 DIAGNOSIS — M25.561 ARTHRALGIA OF BOTH KNEES: ICD-10-CM

## 2018-12-14 PROCEDURE — 1101F PT FALLS ASSESS-DOCD LE1/YR: CPT | Performed by: INTERNAL MEDICINE

## 2018-12-14 PROCEDURE — 1090F PRES/ABSN URINE INCON ASSESS: CPT | Performed by: INTERNAL MEDICINE

## 2018-12-14 PROCEDURE — G8482 FLU IMMUNIZE ORDER/ADMIN: HCPCS | Performed by: INTERNAL MEDICINE

## 2018-12-14 PROCEDURE — G8417 CALC BMI ABV UP PARAM F/U: HCPCS | Performed by: INTERNAL MEDICINE

## 2018-12-14 PROCEDURE — G8427 DOCREV CUR MEDS BY ELIG CLIN: HCPCS | Performed by: INTERNAL MEDICINE

## 2018-12-14 PROCEDURE — 1123F ACP DISCUSS/DSCN MKR DOCD: CPT | Performed by: INTERNAL MEDICINE

## 2018-12-14 PROCEDURE — 1036F TOBACCO NON-USER: CPT | Performed by: INTERNAL MEDICINE

## 2018-12-14 PROCEDURE — 99213 OFFICE O/P EST LOW 20 MIN: CPT | Performed by: INTERNAL MEDICINE

## 2018-12-14 PROCEDURE — G8399 PT W/DXA RESULTS DOCUMENT: HCPCS | Performed by: INTERNAL MEDICINE

## 2018-12-14 PROCEDURE — 4040F PNEUMOC VAC/ADMIN/RCVD: CPT | Performed by: INTERNAL MEDICINE

## 2018-12-14 RX ORDER — PREDNISONE 20 MG/1
20 TABLET ORAL 2 TIMES DAILY
Qty: 10 TABLET | Refills: 0 | Status: SHIPPED | OUTPATIENT
Start: 2018-12-14 | End: 2019-02-04 | Stop reason: SDUPTHER

## 2018-12-14 ASSESSMENT — PATIENT HEALTH QUESTIONNAIRE - PHQ9
SUM OF ALL RESPONSES TO PHQ QUESTIONS 1-9: 0
1. LITTLE INTEREST OR PLEASURE IN DOING THINGS: 0
2. FEELING DOWN, DEPRESSED OR HOPELESS: 0
SUM OF ALL RESPONSES TO PHQ QUESTIONS 1-9: 0
SUM OF ALL RESPONSES TO PHQ9 QUESTIONS 1 & 2: 0

## 2019-01-02 DIAGNOSIS — R60.9 EDEMA, UNSPECIFIED TYPE: ICD-10-CM

## 2019-01-02 RX ORDER — HYDROCHLOROTHIAZIDE 25 MG/1
25 TABLET ORAL DAILY
Qty: 90 TABLET | Refills: 2 | Status: SHIPPED | OUTPATIENT
Start: 2019-01-02 | End: 2019-01-06 | Stop reason: SDUPTHER

## 2019-01-06 DIAGNOSIS — R60.9 EDEMA, UNSPECIFIED TYPE: ICD-10-CM

## 2019-01-08 RX ORDER — HYDROCHLOROTHIAZIDE 25 MG/1
TABLET ORAL
Qty: 90 TABLET | Refills: 2 | Status: ON HOLD | OUTPATIENT
Start: 2019-01-08 | End: 2019-06-04 | Stop reason: HOSPADM

## 2019-01-28 ENCOUNTER — OFFICE VISIT (OUTPATIENT)
Dept: INTERNAL MEDICINE CLINIC | Age: 84
End: 2019-01-28
Payer: MEDICARE

## 2019-01-28 VITALS
SYSTOLIC BLOOD PRESSURE: 124 MMHG | BODY MASS INDEX: 28.76 KG/M2 | DIASTOLIC BLOOD PRESSURE: 76 MMHG | WEIGHT: 137 LBS | HEIGHT: 58 IN

## 2019-01-28 DIAGNOSIS — E24.9 CUSHING SYNDROME (HCC): ICD-10-CM

## 2019-01-28 DIAGNOSIS — M05.79 RHEUMATOID ARTHRITIS INVOLVING MULTIPLE SITES WITH POSITIVE RHEUMATOID FACTOR (HCC): ICD-10-CM

## 2019-01-28 DIAGNOSIS — E03.9 HYPOTHYROIDISM, UNSPECIFIED TYPE: Primary | ICD-10-CM

## 2019-01-28 DIAGNOSIS — I10 BENIGN ESSENTIAL HTN: ICD-10-CM

## 2019-01-28 PROCEDURE — G8417 CALC BMI ABV UP PARAM F/U: HCPCS | Performed by: INTERNAL MEDICINE

## 2019-01-28 PROCEDURE — G8399 PT W/DXA RESULTS DOCUMENT: HCPCS | Performed by: INTERNAL MEDICINE

## 2019-01-28 PROCEDURE — G8427 DOCREV CUR MEDS BY ELIG CLIN: HCPCS | Performed by: INTERNAL MEDICINE

## 2019-01-28 PROCEDURE — 99213 OFFICE O/P EST LOW 20 MIN: CPT | Performed by: INTERNAL MEDICINE

## 2019-01-28 PROCEDURE — 1090F PRES/ABSN URINE INCON ASSESS: CPT | Performed by: INTERNAL MEDICINE

## 2019-01-28 PROCEDURE — 1036F TOBACCO NON-USER: CPT | Performed by: INTERNAL MEDICINE

## 2019-01-28 PROCEDURE — G8482 FLU IMMUNIZE ORDER/ADMIN: HCPCS | Performed by: INTERNAL MEDICINE

## 2019-01-28 PROCEDURE — 1123F ACP DISCUSS/DSCN MKR DOCD: CPT | Performed by: INTERNAL MEDICINE

## 2019-01-28 PROCEDURE — 1101F PT FALLS ASSESS-DOCD LE1/YR: CPT | Performed by: INTERNAL MEDICINE

## 2019-01-28 PROCEDURE — 4040F PNEUMOC VAC/ADMIN/RCVD: CPT | Performed by: INTERNAL MEDICINE

## 2019-01-28 RX ORDER — ACETAMINOPHEN 325 MG/1
650 TABLET ORAL EVERY 6 HOURS PRN
COMMUNITY

## 2019-01-28 ASSESSMENT — PATIENT HEALTH QUESTIONNAIRE - PHQ9
SUM OF ALL RESPONSES TO PHQ QUESTIONS 1-9: 0
SUM OF ALL RESPONSES TO PHQ QUESTIONS 1-9: 0
1. LITTLE INTEREST OR PLEASURE IN DOING THINGS: 0
2. FEELING DOWN, DEPRESSED OR HOPELESS: 0
SUM OF ALL RESPONSES TO PHQ9 QUESTIONS 1 & 2: 0

## 2019-02-04 ENCOUNTER — TELEPHONE (OUTPATIENT)
Dept: INTERNAL MEDICINE CLINIC | Age: 84
End: 2019-02-04

## 2019-02-04 DIAGNOSIS — M05.79 RHEUMATOID ARTHRITIS INVOLVING MULTIPLE SITES WITH POSITIVE RHEUMATOID FACTOR (HCC): ICD-10-CM

## 2019-02-04 RX ORDER — PREDNISONE 20 MG/1
20 TABLET ORAL 2 TIMES DAILY
Qty: 10 TABLET | Refills: 0 | Status: SHIPPED | OUTPATIENT
Start: 2019-02-04 | End: 2019-02-09

## 2019-02-06 RX ORDER — METHYLPREDNISOLONE 4 MG/1
TABLET ORAL
Qty: 1 KIT | Refills: 0 | OUTPATIENT
Start: 2019-02-06 | End: 2019-02-11

## 2019-02-12 RX ORDER — METHYLPREDNISOLONE 4 MG/1
TABLET ORAL
Qty: 1 KIT | Refills: 0 | Status: SHIPPED | OUTPATIENT
Start: 2019-02-12 | End: 2019-02-18

## 2019-02-25 ENCOUNTER — HOSPITAL ENCOUNTER (OUTPATIENT)
Dept: WOMENS IMAGING | Age: 84
Discharge: HOME | End: 2019-02-27
Payer: MEDICARE

## 2019-02-25 DIAGNOSIS — Z12.31 SCREENING MAMMOGRAM, ENCOUNTER FOR: ICD-10-CM

## 2019-02-25 PROCEDURE — 77063 BREAST TOMOSYNTHESIS BI: CPT

## 2019-03-25 RX ORDER — FUROSEMIDE 20 MG/1
20 TABLET ORAL DAILY
Qty: 60 TABLET | Refills: 3 | Status: ON HOLD | OUTPATIENT
Start: 2019-03-25 | End: 2019-06-04 | Stop reason: HOSPADM

## 2019-05-13 ENCOUNTER — TELEPHONE (OUTPATIENT)
Dept: INTERNAL MEDICINE CLINIC | Age: 84
End: 2019-05-13

## 2019-05-16 ENCOUNTER — APPOINTMENT (OUTPATIENT)
Dept: GENERAL RADIOLOGY | Age: 84
DRG: 470 | End: 2019-05-16
Payer: MEDICARE

## 2019-05-16 ENCOUNTER — HOSPITAL ENCOUNTER (INPATIENT)
Age: 84
LOS: 5 days | Discharge: NURSING, REHAB OR LONG-TERM CARE | DRG: 470 | End: 2019-05-21
Attending: EMERGENCY MEDICINE | Admitting: ORTHOPAEDIC SURGERY
Payer: MEDICARE

## 2019-05-16 ENCOUNTER — APPOINTMENT (OUTPATIENT)
Dept: CT IMAGING | Age: 84
DRG: 470 | End: 2019-05-16
Payer: MEDICARE

## 2019-05-16 ENCOUNTER — ANESTHESIA EVENT (OUTPATIENT)
Dept: OPERATING ROOM | Age: 84
DRG: 470 | End: 2019-05-16
Payer: MEDICARE

## 2019-05-16 DIAGNOSIS — S72.001A CLOSED FRACTURE OF RIGHT HIP, INITIAL ENCOUNTER (HCC): Primary | ICD-10-CM

## 2019-05-16 PROBLEM — S72.001D CLOSED FRACTURE OF NECK OF RIGHT FEMUR WITH ROUTINE HEALING: Status: ACTIVE | Noted: 2019-05-16

## 2019-05-16 LAB
ABO/RH: NORMAL
ABSOLUTE EOS #: 0.1 K/UL (ref 0–0.4)
ABSOLUTE IMMATURE GRANULOCYTE: ABNORMAL K/UL (ref 0–0.3)
ABSOLUTE LYMPH #: 0.9 K/UL (ref 1–4.8)
ABSOLUTE MONO #: 0.7 K/UL (ref 0.1–1.3)
ALBUMIN SERPL-MCNC: 4 G/DL (ref 3.5–5.2)
ALBUMIN/GLOBULIN RATIO: ABNORMAL (ref 1–2.5)
ALP BLD-CCNC: 99 U/L (ref 35–104)
ALT SERPL-CCNC: 15 U/L (ref 5–33)
ANION GAP SERPL CALCULATED.3IONS-SCNC: 13 MMOL/L (ref 9–17)
ANION GAP SERPL CALCULATED.3IONS-SCNC: 17 MMOL/L (ref 9–17)
ANTIBODY SCREEN: NEGATIVE
ARM BAND NUMBER: NORMAL
AST SERPL-CCNC: 24 U/L
BASOPHILS # BLD: 1 % (ref 0–2)
BASOPHILS ABSOLUTE: 0.1 K/UL (ref 0–0.2)
BILIRUB SERPL-MCNC: 0.27 MG/DL (ref 0.3–1.2)
BLOOD BANK COMMENT: NORMAL
BUN BLDV-MCNC: 18 MG/DL (ref 8–23)
BUN BLDV-MCNC: 9 MG/DL (ref 8–23)
BUN/CREAT BLD: ABNORMAL (ref 9–20)
BUN/CREAT BLD: ABNORMAL (ref 9–20)
CALCIUM SERPL-MCNC: 8.5 MG/DL (ref 8.6–10.4)
CALCIUM SERPL-MCNC: 9.5 MG/DL (ref 8.6–10.4)
CHLORIDE BLD-SCNC: 98 MMOL/L (ref 98–107)
CHLORIDE BLD-SCNC: 99 MMOL/L (ref 98–107)
CO2: 20 MMOL/L (ref 20–31)
CO2: 23 MMOL/L (ref 20–31)
CREAT SERPL-MCNC: 0.53 MG/DL (ref 0.5–0.9)
CREAT SERPL-MCNC: <0.4 MG/DL (ref 0.5–0.9)
DIFFERENTIAL TYPE: ABNORMAL
EOSINOPHILS RELATIVE PERCENT: 1 % (ref 0–4)
EXPIRATION DATE: NORMAL
GFR AFRICAN AMERICAN: >60 ML/MIN
GFR AFRICAN AMERICAN: ABNORMAL ML/MIN
GFR NON-AFRICAN AMERICAN: >60 ML/MIN
GFR NON-AFRICAN AMERICAN: ABNORMAL ML/MIN
GFR SERPL CREATININE-BSD FRML MDRD: ABNORMAL ML/MIN/{1.73_M2}
GLUCOSE BLD-MCNC: 129 MG/DL (ref 70–99)
GLUCOSE BLD-MCNC: 148 MG/DL (ref 70–99)
HCT VFR BLD CALC: 35.6 % (ref 36–46)
HCT VFR BLD CALC: 35.6 % (ref 36–46)
HEMOGLOBIN: 11.4 G/DL (ref 12–16)
HEMOGLOBIN: 11.7 G/DL (ref 12–16)
IMMATURE GRANULOCYTES: ABNORMAL %
INR BLD: 1.1
LYMPHOCYTES # BLD: 12 % (ref 24–44)
MCH RBC QN AUTO: 26.8 PG (ref 26–34)
MCH RBC QN AUTO: 27 PG (ref 26–34)
MCHC RBC AUTO-ENTMCNC: 32.2 G/DL (ref 31–37)
MCHC RBC AUTO-ENTMCNC: 32.7 G/DL (ref 31–37)
MCV RBC AUTO: 82.6 FL (ref 80–100)
MCV RBC AUTO: 83.5 FL (ref 80–100)
MONOCYTES # BLD: 8 % (ref 1–7)
NRBC AUTOMATED: ABNORMAL PER 100 WBC
NRBC AUTOMATED: ABNORMAL PER 100 WBC
PARTIAL THROMBOPLASTIN TIME: 27.6 SEC (ref 24–36)
PDW BLD-RTO: 16.6 % (ref 11.5–14.9)
PDW BLD-RTO: 17.2 % (ref 11.5–14.9)
PLATELET # BLD: 265 K/UL (ref 150–450)
PLATELET # BLD: 278 K/UL (ref 150–450)
PLATELET ESTIMATE: ABNORMAL
PMV BLD AUTO: 7.1 FL (ref 6–12)
PMV BLD AUTO: 7.7 FL (ref 6–12)
POTASSIUM SERPL-SCNC: 3.4 MMOL/L (ref 3.7–5.3)
POTASSIUM SERPL-SCNC: 3.7 MMOL/L (ref 3.7–5.3)
PROTHROMBIN TIME: 13.8 SEC (ref 11.8–14.6)
RBC # BLD: 4.26 M/UL (ref 4–5.2)
RBC # BLD: 4.32 M/UL (ref 4–5.2)
RBC # BLD: ABNORMAL 10*6/UL
SEG NEUTROPHILS: 78 % (ref 36–66)
SEGMENTED NEUTROPHILS ABSOLUTE COUNT: 6.5 K/UL (ref 1.3–9.1)
SODIUM BLD-SCNC: 134 MMOL/L (ref 135–144)
SODIUM BLD-SCNC: 136 MMOL/L (ref 135–144)
TOTAL PROTEIN: 7.2 G/DL (ref 6.4–8.3)
TROPONIN INTERP: ABNORMAL
TROPONIN T: ABNORMAL NG/ML
TROPONIN, HIGH SENSITIVITY: 16 NG/L (ref 0–14)
WBC # BLD: 7.7 K/UL (ref 3.5–11)
WBC # BLD: 8.3 K/UL (ref 3.5–11)
WBC # BLD: ABNORMAL 10*3/UL

## 2019-05-16 PROCEDURE — 2580000003 HC RX 258: Performed by: ORTHOPAEDIC SURGERY

## 2019-05-16 PROCEDURE — 6360000002 HC RX W HCPCS: Performed by: INTERNAL MEDICINE

## 2019-05-16 PROCEDURE — 2500000003 HC RX 250 WO HCPCS: Performed by: INTERNAL MEDICINE

## 2019-05-16 PROCEDURE — 99232 SBSQ HOSP IP/OBS MODERATE 35: CPT | Performed by: INTERNAL MEDICINE

## 2019-05-16 PROCEDURE — 99285 EMERGENCY DEPT VISIT HI MDM: CPT

## 2019-05-16 PROCEDURE — 36415 COLL VENOUS BLD VENIPUNCTURE: CPT

## 2019-05-16 PROCEDURE — 86901 BLOOD TYPING SEROLOGIC RH(D): CPT

## 2019-05-16 PROCEDURE — 86900 BLOOD TYPING SEROLOGIC ABO: CPT

## 2019-05-16 PROCEDURE — 6370000000 HC RX 637 (ALT 250 FOR IP): Performed by: INTERNAL MEDICINE

## 2019-05-16 PROCEDURE — 1200000000 HC SEMI PRIVATE

## 2019-05-16 PROCEDURE — 99223 1ST HOSP IP/OBS HIGH 75: CPT | Performed by: ORTHOPAEDIC SURGERY

## 2019-05-16 PROCEDURE — 6360000002 HC RX W HCPCS: Performed by: ORTHOPAEDIC SURGERY

## 2019-05-16 PROCEDURE — 6360000002 HC RX W HCPCS: Performed by: EMERGENCY MEDICINE

## 2019-05-16 PROCEDURE — 85027 COMPLETE CBC AUTOMATED: CPT

## 2019-05-16 PROCEDURE — 85025 COMPLETE CBC W/AUTO DIFF WBC: CPT

## 2019-05-16 PROCEDURE — 96374 THER/PROPH/DIAG INJ IV PUSH: CPT

## 2019-05-16 PROCEDURE — 96375 TX/PRO/DX INJ NEW DRUG ADDON: CPT

## 2019-05-16 PROCEDURE — 80053 COMPREHEN METABOLIC PANEL: CPT

## 2019-05-16 PROCEDURE — 73502 X-RAY EXAM HIP UNI 2-3 VIEWS: CPT

## 2019-05-16 PROCEDURE — 84484 ASSAY OF TROPONIN QUANT: CPT

## 2019-05-16 PROCEDURE — 85730 THROMBOPLASTIN TIME PARTIAL: CPT

## 2019-05-16 PROCEDURE — 80048 BASIC METABOLIC PNL TOTAL CA: CPT

## 2019-05-16 PROCEDURE — 94761 N-INVAS EAR/PLS OXIMETRY MLT: CPT

## 2019-05-16 PROCEDURE — 85610 PROTHROMBIN TIME: CPT

## 2019-05-16 PROCEDURE — 71045 X-RAY EXAM CHEST 1 VIEW: CPT

## 2019-05-16 PROCEDURE — 86850 RBC ANTIBODY SCREEN: CPT

## 2019-05-16 PROCEDURE — 70450 CT HEAD/BRAIN W/O DYE: CPT

## 2019-05-16 PROCEDURE — 94640 AIRWAY INHALATION TREATMENT: CPT

## 2019-05-16 PROCEDURE — 93005 ELECTROCARDIOGRAM TRACING: CPT

## 2019-05-16 RX ORDER — PANTOPRAZOLE SODIUM 40 MG/1
40 TABLET, DELAYED RELEASE ORAL
Status: DISCONTINUED | OUTPATIENT
Start: 2019-05-17 | End: 2019-05-21 | Stop reason: HOSPADM

## 2019-05-16 RX ORDER — MORPHINE SULFATE 2 MG/ML
2 INJECTION, SOLUTION INTRAMUSCULAR; INTRAVENOUS
Status: DISCONTINUED | OUTPATIENT
Start: 2019-05-16 | End: 2019-05-16

## 2019-05-16 RX ORDER — LORAZEPAM 2 MG/ML
1 INJECTION INTRAMUSCULAR ONCE
Status: COMPLETED | OUTPATIENT
Start: 2019-05-16 | End: 2019-05-16

## 2019-05-16 RX ORDER — SODIUM CHLORIDE 0.9 % (FLUSH) 0.9 %
10 SYRINGE (ML) INJECTION EVERY 12 HOURS SCHEDULED
Status: DISCONTINUED | OUTPATIENT
Start: 2019-05-16 | End: 2019-05-21 | Stop reason: HOSPADM

## 2019-05-16 RX ORDER — HYDRALAZINE HYDROCHLORIDE 20 MG/ML
20 INJECTION INTRAMUSCULAR; INTRAVENOUS EVERY 4 HOURS PRN
Status: DISCONTINUED | OUTPATIENT
Start: 2019-05-16 | End: 2019-05-21 | Stop reason: HOSPADM

## 2019-05-16 RX ORDER — LOSARTAN POTASSIUM 25 MG/1
25 TABLET ORAL DAILY
Status: DISCONTINUED | OUTPATIENT
Start: 2019-05-16 | End: 2019-05-21 | Stop reason: HOSPADM

## 2019-05-16 RX ORDER — SODIUM CHLORIDE 0.9 % (FLUSH) 0.9 %
10 SYRINGE (ML) INJECTION PRN
Status: DISCONTINUED | OUTPATIENT
Start: 2019-05-16 | End: 2019-05-21 | Stop reason: HOSPADM

## 2019-05-16 RX ORDER — POTASSIUM CHLORIDE 7.45 MG/ML
10 INJECTION INTRAVENOUS PRN
Status: DISCONTINUED | OUTPATIENT
Start: 2019-05-16 | End: 2019-05-21 | Stop reason: HOSPADM

## 2019-05-16 RX ORDER — METOPROLOL TARTRATE 5 MG/5ML
5 INJECTION INTRAVENOUS EVERY 6 HOURS
Status: DISCONTINUED | OUTPATIENT
Start: 2019-05-16 | End: 2019-05-20

## 2019-05-16 RX ORDER — ACETAMINOPHEN 325 MG/1
650 TABLET ORAL EVERY 4 HOURS PRN
Status: DISCONTINUED | OUTPATIENT
Start: 2019-05-16 | End: 2019-05-21 | Stop reason: HOSPADM

## 2019-05-16 RX ORDER — LORAZEPAM 0.5 MG/1
0.5 TABLET ORAL EVERY 4 HOURS PRN
Status: DISCONTINUED | OUTPATIENT
Start: 2019-05-16 | End: 2019-05-21 | Stop reason: HOSPADM

## 2019-05-16 RX ORDER — MORPHINE SULFATE 4 MG/ML
3 INJECTION, SOLUTION INTRAMUSCULAR; INTRAVENOUS
Status: DISCONTINUED | OUTPATIENT
Start: 2019-05-16 | End: 2019-05-16

## 2019-05-16 RX ORDER — POTASSIUM CHLORIDE 20 MEQ/1
40 TABLET, EXTENDED RELEASE ORAL PRN
Status: DISCONTINUED | OUTPATIENT
Start: 2019-05-16 | End: 2019-05-21 | Stop reason: HOSPADM

## 2019-05-16 RX ORDER — IPRATROPIUM BROMIDE AND ALBUTEROL SULFATE 2.5; .5 MG/3ML; MG/3ML
1 SOLUTION RESPIRATORY (INHALATION)
Status: DISCONTINUED | OUTPATIENT
Start: 2019-05-16 | End: 2019-05-21 | Stop reason: HOSPADM

## 2019-05-16 RX ORDER — LEVOTHYROXINE SODIUM 0.05 MG/1
50 TABLET ORAL DAILY
Status: DISCONTINUED | OUTPATIENT
Start: 2019-05-16 | End: 2019-05-21 | Stop reason: HOSPADM

## 2019-05-16 RX ORDER — MORPHINE SULFATE 2 MG/ML
1 INJECTION, SOLUTION INTRAMUSCULAR; INTRAVENOUS
Status: DISCONTINUED | OUTPATIENT
Start: 2019-05-16 | End: 2019-05-16

## 2019-05-16 RX ORDER — LIDOCAINE 4 G/G
1 PATCH TOPICAL DAILY
Status: DISCONTINUED | OUTPATIENT
Start: 2019-05-16 | End: 2019-05-21 | Stop reason: HOSPADM

## 2019-05-16 RX ADMIN — LORAZEPAM 0.5 MG: 0.5 TABLET ORAL at 14:00

## 2019-05-16 RX ADMIN — METOPROLOL TARTRATE 5 MG: 1 INJECTION, SOLUTION INTRAVENOUS at 20:36

## 2019-05-16 RX ADMIN — Medication 10 ML: at 20:38

## 2019-05-16 RX ADMIN — Medication 10 ML: at 07:32

## 2019-05-16 RX ADMIN — MORPHINE SULFATE 2 MG: 2 INJECTION, SOLUTION INTRAMUSCULAR; INTRAVENOUS at 09:56

## 2019-05-16 RX ADMIN — HYDROMORPHONE HYDROCHLORIDE 2 MG: 1 INJECTION, SOLUTION INTRAMUSCULAR; INTRAVENOUS; SUBCUTANEOUS at 16:46

## 2019-05-16 RX ADMIN — MORPHINE SULFATE 2 MG: 2 INJECTION, SOLUTION INTRAMUSCULAR; INTRAVENOUS at 07:32

## 2019-05-16 RX ADMIN — POTASSIUM CHLORIDE 40 MEQ: 1500 TABLET, EXTENDED RELEASE ORAL at 17:22

## 2019-05-16 RX ADMIN — HYDROMORPHONE HYDROCHLORIDE 0.5 MG: 1 INJECTION, SOLUTION INTRAMUSCULAR; INTRAVENOUS; SUBCUTANEOUS at 01:08

## 2019-05-16 RX ADMIN — LORAZEPAM 1 MG: 2 INJECTION, SOLUTION INTRAMUSCULAR; INTRAVENOUS at 01:23

## 2019-05-16 RX ADMIN — MORPHINE SULFATE 2 MG: 2 INJECTION, SOLUTION INTRAMUSCULAR; INTRAVENOUS at 11:57

## 2019-05-16 RX ADMIN — HYDROCORTISONE SODIUM SUCCINATE 50 MG: 100 INJECTION, POWDER, FOR SOLUTION INTRAMUSCULAR; INTRAVENOUS at 14:01

## 2019-05-16 RX ADMIN — HYDROMORPHONE HYDROCHLORIDE 2 MG: 1 INJECTION, SOLUTION INTRAMUSCULAR; INTRAVENOUS; SUBCUTANEOUS at 20:36

## 2019-05-16 RX ADMIN — MORPHINE SULFATE 3 MG: 4 INJECTION INTRAVENOUS at 13:59

## 2019-05-16 RX ADMIN — IPRATROPIUM BROMIDE AND ALBUTEROL SULFATE 1 AMPULE: .5; 3 SOLUTION RESPIRATORY (INHALATION) at 21:09

## 2019-05-16 RX ADMIN — HYDROCORTISONE SODIUM SUCCINATE 50 MG: 100 INJECTION, POWDER, FOR SOLUTION INTRAMUSCULAR; INTRAVENOUS at 22:42

## 2019-05-16 RX ADMIN — HYDROMORPHONE HYDROCHLORIDE 0.5 MG: 1 INJECTION, SOLUTION INTRAMUSCULAR; INTRAVENOUS; SUBCUTANEOUS at 05:55

## 2019-05-16 RX ADMIN — HYDRALAZINE HYDROCHLORIDE 20 MG: 20 INJECTION, SOLUTION INTRAMUSCULAR; INTRAVENOUS at 05:55

## 2019-05-16 RX ADMIN — METOPROLOL TARTRATE 5 MG: 1 INJECTION, SOLUTION INTRAVENOUS at 14:01

## 2019-05-16 ASSESSMENT — PAIN SCALES - GENERAL
PAINLEVEL_OUTOF10: 5
PAINLEVEL_OUTOF10: 0
PAINLEVEL_OUTOF10: 10
PAINLEVEL_OUTOF10: 7
PAINLEVEL_OUTOF10: 10
PAINLEVEL_OUTOF10: 0
PAINLEVEL_OUTOF10: 6
PAINLEVEL_OUTOF10: 9
PAINLEVEL_OUTOF10: 10
PAINLEVEL_OUTOF10: 5
PAINLEVEL_OUTOF10: 10
PAINLEVEL_OUTOF10: 0
PAINLEVEL_OUTOF10: 10
PAINLEVEL_OUTOF10: 10

## 2019-05-16 ASSESSMENT — PAIN DESCRIPTION - ORIENTATION: ORIENTATION: RIGHT

## 2019-05-16 ASSESSMENT — PAIN DESCRIPTION - LOCATION: LOCATION: HIP

## 2019-05-16 NOTE — CARE COORDINATION
CASE MANAGEMENT NOTE:    Admission Date:  5/16/2019 Ramez Larry is a 80 y.o.  female    Admitted for : Closed right hip fracture, initial encounter ([facility] [S72.001A]  Closed right hip fracture, initial encounter ([facility] [S72.001A]    Met with:  Patient    PCP:  Dr. Marie Alston:  Medicare      Current Residence/ Living Arrangements:  independently at home             Current Services PTA:  No    Is patient agreeable to VNS: Yes    Freedom of choice provided: Yes    List of [address] provided: Yes    VNS chosen:  No    DME:  straight cane    Home Oxygen: No    Nebulizer: No    CPAP/BIPAP: No    Supplier: N/A    Potential Assistance Needed: No    SNF needed: Yes    Ph[facility] [address]       Is the Patient an [employer] with Readmission Risk Score greater than 14%? Yes  If yes, pt needs a follow up appointment made within 7 days. Does Patient want to use MEDS to BEDS? No    Family Members/Caregivers that pt would like involved in their care:    Yes    If yes, list name here:  Rayshawn Paul:  other             Is patient in Isolation/One on One/Altered Mental Status? Yes  If yes, skip next question. If no, would they like an I-Pad to  use? No  If yes, call [phone]. Discharge Plan:  5/16/19 Medicare PT is from home in a one story home she uses a cane and is agreeable to go to [address] [place] [address] pt has a fx hip pt will have surgery tomorrow will follow .//tv             Electronically signed by:  Nallely Funes RN on 5/16/2019 at 11:29 AM

## 2019-05-16 NOTE — CARE COORDINATION
[address] Transitions Interview     2019    Patient: Michelle Jaramillo Patient : 1935   MRN: [id number]  Reason for Admission: Rt hip fracture  RARS: Readmission Risk Score: 15         Spoke with:Prosper (spouse)    Met with Zane Morales and Prosper at bedside. Evelyn calling out because of pain. She was medicated and pt went to sleep. Spoke with Bridger Madrigal and he stated that they are planning on surgery tomorrow. Bedside nurse stated Zane Morales needed surgical clearance and then surgery would occur tomorrow. Bridger Madrigal stated that Zane Morales would need to go to a rehab after the surgery. He would like [facility]. Explained CTC role and pt receptive to outreach. Contact information given. Met with Ortho NavigatorCourtney and she stated that if pt surgery is a padmini she will qualify for [facility] bundle program.  Will continue to monitor pt. Readmission Risk  Patient Active Problem List   Diagnosis    Hypothyroidism    Osteoporosis    Esophageal reflux    Cataract    Hyperlipidemia    Osteoarthritis    Anemia    Allergic rhinitis    Asthma    Rheumatoid arthritis ([address]    Methotrexate lung    Benign essential HTN    Cushing syndrome ([address]    Closed right hip fracture, initial encounter ([facility] [address] [facility]    Closed fracture of neck of right femur with routine healing       Inpatient Assessment  Care Transitions Summary    Care Transitions Inpatient Review  Medication Review  Housing Review  Social Support  Durable Medical Equipment  Functional Review  Hearing and Vision  Care Transitions Interventions         Follow Up  Future Appointments   Date Time Provider Onel Bolton   2019  2:40 PM Lux Lee MD [address] [place] [address]  There are no preventive care reminders to display for this patient.     Julito Denton RN

## 2019-05-16 NOTE — CONSULTS
[address] [facility]    Consult Note. Date:   5/16/2019  Patient name:  Camryn Antunez  Date of admission:  5/16/2019 12:12 AM  MRN:   [id number]  YOB: 1935    Pt seen at the request of Adrianne Llanes MD    CHIEF COMPLAINT:     History Obtained From:  Patient and chart review. HISTORY OF PRESENT ILLNESS:      The patient is a 80 y.o.  female who is admitted to the hospital for fall    has rheumatoid arthritis    Right hip fracture       Past Medical History:   has a past medical history of Allergic rhinitis, Anemia, Anxiety, Asthma, Cataract, COPD (chronic obstructive pulmonary disease) ([address] CTS (carpal tunnel syndrome), Esophageal reflux, Headache(784.0), Hyperlipidemia, Hypertension, Hypothyroidism, Osteoarthritis, Osteoporosis, and Rheumatoid arthritis(714.0). Past Surgical History:   has a past surgical history that includes Rotator cuff repair (Left); Tonsillectomy and adenoidectomy; Hysterectomy, vaginal; Foot surgery; Hemorrhoid surgery; Dilation and curettage of uterus; Cataract removal with implant (Bilateral); Inguinal hernia repair; Cystocele repair; joint replacement (Left); other surgical history; Hammer toe surgery; and other surgical history. Home Medications:    Prior to Admission medications    Medication Sig Start Date End Date Taking?  Authorizing Provider   furosemide (LASIX) 20 MG tablet Take 1 tablet by mouth daily 3/25/19  Yes Jere Rubio MD   acetaminophen (TYLENOL) 325 MG tablet Take 650 mg by mouth every 6 hours as needed for Pain   Yes Historical Provider, MD   hydrochlorothiazide (HYDRODIURIL) 25 MG tablet TAKE 1 TABLET BY MOUTH EVERY DAY 1/8/19  Yes Jere Rubio MD   olmesartan (BENICAR) 40 MG tablet Take 1 tablet by mouth daily 12/7/18  Yes Jere Rubio MD   levothyroxine (SYNTHROID) 50 MCG tablet Take 1 tablet by mouth daily 12/7/18  Yes Jere Rubio MD aspirin 81 MG tablet Take 81 mg by mouth daily   Yes Historical Provider, MD   fluticasone Reginia Sciara) 50 MCG/ACT nasal spray  2/18/18  Yes Historical Provider, MD   neomycin-polymyxin-dexameth [id number] [address] [place] [address]  2/1/18  Yes Historical Provider, MD   oxyCODONE-acetaminophen (PERCOCET)  MG per tablet take 1 tablet by mouth every 6 hours if needed 12/14/17  Yes Historical Provider, MD   vitamin D (ERGOCALCIFEROL) 59245 UNITS CAPS capsule  3/2/17  Yes Historical Provider, MD   clonazePAM (KLONOPIN) 0.5 MG tablet Take 1 tablet by mouth nightly as needed for Anxiety 12/7/16  Yes Donnie Damon MD   predniSONE (DELTASONE) 5 MG tablet take 1 tablet by mouth once daily 5/2/16  Yes Historical Provider, MD   clobetasol (TEMOVATE) 0.05 % ointment Apply topically 2 times daily Apply topically 2 times daily. Yes Historical Provider, MD   hydroxychloroquine (PLAQUENIL) 200 MG tablet  7/31/15  Yes Historical Provider, MD   [facility] 120 METERED DOSES 220 MCG/INH inhaler  7/31/15  Yes Historical Provider, MD   bacitracin-polymyxin b (POLYSPORIN) 500-76039 UNIT/GM ophthalmic ointment  7/1/15  Yes Historical Provider, MD   RABEprazole (ACIPHEX) 20 MG tablet  7/6/15  Yes Historical Provider, MD   PROAIR  (90 BASE) MCG/ACT inhaler  4/9/15  Yes Historical Provider, MD   clotrimazole (LOTRIMIN) 1 % cream apply externally three times a day if needed 3/6/15  Yes Donnie Damon MD   nystatin (MYCOSTATIN) 945333 UNIT/GM powder Apply 3 times daily. 12/11/14  Yes Donnie Damon MD   Multiple Vitamins-Minerals (MULTIVITAMIN PO) Take  by mouth daily. Yes Historical Provider, MD   polyethylene glycol (MIRALAX) PACK packet Take 17 g by mouth daily. Yes Historical Provider, MD   mometasone (NASONEX) 50 MCG/ACT nasal spray 2 sprays by Nasal route daily as needed. Yes Historical Provider, MD   beta carotene ([facility] BETA CAROTENE) 71574 UNITS capsule Take 25,000 Units by mouth daily.    Yes Historical Provider, MD Calcium Carbonate-Vit D-Min (CALTRATE 600+D PLUS MINERALS) 600-800 MG-UNIT TABS Take  by mouth daily. Yes Historical Provider, MD   desloratadine (CLARINEX) 5 MG tablet Take 5 mg by mouth daily. Yes Historical Provider, MD   Omega-3 Fatty Acids (FISH OIL) 1200 MG CAPS Take  by mouth daily. Yes Historical Provider, MD   GARLIC Take  by mouth daily. Yes Historical Provider, MD   leflunomide (ARAVA) 10 MG tablet Take 10 mg by mouth daily. Yes Historical Provider, MD   simethicone (MYLANTA GAS) 125 MG chewable tablet Take 125 mg by mouth every 6 hours as needed. Yes Historical Provider, MD   zoledronic acid (RECLAST) 5 MG/100ML SOLN Infuse 5 mg intravenously once. Yearly   Yes Historical Provider, MD   ascorbic acid (VITAMIN C) 500 MG tablet Take 500 mg by mouth daily. Yes Historical Provider, MD   vitamin B-12 (CYANOCOBALAMIN) 500 MCG tablet Take 500 mcg by mouth daily. Yes Historical Provider, MD   b complex vitamins capsule Take 1 capsule by mouth daily. Yes Historical Provider, MD   Biotin 1000 MCG TABS Take  by mouth daily. Yes Historical Provider, MD   formoterol (PERFOROMIST) 20 MCG/2ML nebulizer solution Take 20 mcg by nebulization as needed. Yes Historical Provider, MD       Allergies:  No known allergies    Social History:   reports that she has never smoked. She has never used smokeless tobacco. She reports that she does not drink alcohol or use drugs. Family History: family history includes Cancer in her mother and paternal grandmother; Heart Disease in her father, maternal grandfather, maternal grandmother, mother, paternal grandfather, and paternal grandmother; Hypertension in her mother; Stroke in her maternal grandmother. REVIEW OF SYSTEMS:    · Constitutional: Negative for weight loss  · Eyes: Negative for visual changes, diplopia, scleral icterus. · ENT: Negative for Headaches, hearing loss, vertigo, mouth sores, sore throat.   · Cardiovascular: Negative for lightheadedness/orthostatic symptoms ,chest pain, dyspnea on exertion, palpitations or loss of consciousness. · Respiratory: Negative for cough or wheezing, sputum production, hemoptysis, pleuritic pain. · Gastrointestinal: Negative for nausea/vomiting, change in bowel habits, abdominal pain, dysphagia/appetite loss, hematemesis, blood in stools. · Genitourinary:Negative for change in bladder habits, dysuria, trouble voiding, hematuria. · Musculoskeletal:  Rt hip pain   · Integumentary: Negative for rash, pruritis. · Neurological: Negative for headache, dizziness, change in muscle strength, numbness/tingling, change in gait, balance, coordination,   · Endocrine: negative for temperature intolerance, excessive thirst, fluid intake, or urination, tremor. · Hematologic/Lymphatic: negative for abnormal bruising or bleeding, blood clots, swollen lymph nodes. · Allergic/Immunologic: negative for nasal congestion, pruritis, hives. PHYSICAL EXAM:    BP (!) 154/68   Pulse 96   Temp 97.5 °F (36.4 °C) (Oral)   Resp 20   Ht 4' 11\" (1.499 m)   Wt 132 lb 4.4 oz (60 kg)   SpO2 92%   BMI 26.72 kg/m²      · General appearance: well nourished  · HEENT: Head: Normocephalic, no lesions, without obvious abnormality. · Lungs: clear to auscultation bilaterally  · Heart: regular rate and rhythm, S1, S2 normal, no murmur, click, rub or gallop  · Abdomen: soft, non-tender; bowel sounds normal; no masses,  no organomegaly  · Extremities:rt hip mtender Neurological: Gait normal. Reflexes normal and symmetric.  Sensation grossly normal  · Skin - no rash, no lump   · Eye no icterus no redness  · Lymphatic system-no lymphadenopathy no splenomegaly       DIAGNOSTICS:    Laboratory Testing:  CBC:   Recent Labs     05/16/19 0126   WBC 8.3   HGB 11.7*        BMP:    Recent Labs     05/16/19 0126      K 3.7   CL 99   CO2 20   BUN 18   CREATININE 0.53   GLUCOSE 148*     S. Calcium:  Recent Labs     05/16/19 0126 CALCIUM 9.5     S. Ionized Calcium:No results for input(s): IONCA in the last 72 hours. S. Magnesium:No results for input(s): MG in the last 72 hours. S. Phosphorus:No results for input(s): PHOS in the last 72 hours. S. Glucose:No results for input(s): POCGLU in the last 72 hours. Glycosylated hemoglobin A1C: No results for input(s): LABA1C in the last 72 hours. INR:   Recent Labs     05/16/19 0126   INR 1.1     Hepatic functions:   Recent Labs     05/16/19 0126   ALKPHOS 99   ALT 15   AST 24   PROT 7.2   BILITOT 0.27*   LABALBU 4.0     Pancreatic functions:No results for input(s): LACTA, AMYLASE in the last 72 hours. S. Lactic Acid: No results for input(s): LACTA in the last 72 hours. Cardiac enzymes:No results for input(s): CKTOTAL, CKMB, CKMBINDEX, TROPONINI in the last 72 hours. BNP:No results for input(s): BNP in the last 72 hours. Lipid profile: No results for input(s): CHOL, TRIG, HDL, LDLCALC in the last 72 hours. Invalid input(s): LDL  Blood Gases: No results found for: PH, PCO2, PO2, HCO3, O2SAT  Thyroid functions:   Lab Results   Component Value Date    TSH 3.84 06/18/2018        Imaging/Diagonstics:    Ct Head Wo Contrast    Result Date: 5/16/2019  EXAMINATION: CT OF THE HEAD WITHOUT CONTRAST  5/16/2019 1:49 am TECHNIQUE: CT of the head was performed without the administration of intravenous contrast. Dose modulation, iterative reconstruction, and/or weight based adjustment of the mA/kV was utilized to reduce the radiation dose to as low as reasonably achievable. COMPARISON: 09/23/2013 HISTORY: ORDERING SYSTEM PROVIDED HISTORY: trauma TECHNOLOGIST PROVIDED HISTORY: Ordering Physician Provided Reason for Exam: Fall, patient states she hit her head on the left side Acuity: Acute Type of Exam: Initial FINDINGS: BRAIN/VENTRICLES: There is no acute intracranial hemorrhage, mass effect or midline shift. No abnormal extra-axial fluid collection.   There are areas of hypoattenuation in the periventricular and subcortical white matter, which is nonspecific, but may represent chronic microvasvular ischemic change. The gray-white differentiation is maintained without evidence of an acute infarct. There is no evidence of hydrocephalus. ORBITS: The visualized portion of the orbits demonstrate no acute abnormality. SINUSES: The visualized paranasal sinuses and mastoid air cells demonstrate no acute abnormality. SOFT TISSUES/SKULL:  No acute abnormality of the visualized skull or soft tissues. No acute intracranial abnormality. Xr Hip 2-3 Vw W Pelvis Right    Result Date: 5/16/2019  EXAMINATION: ONE XRAY VIEW OF THE PELVIS AND TWO XRAY VIEWS RIGHT HIP 5/16/2019 2:02 am COMPARISON: 10/09/2015 HISTORY: ORDERING SYSTEM PROVIDED HISTORY: trauma TECHNOLOGIST PROVIDED HISTORY: trauma Ordering Physician Provided Reason for Exam: Pt c/o right hip pain s/p fall 2 hours ago. Acuity: Acute Type of Exam: Initial Mechanism of Injury: Pt c/o right hip pain s/p fall 2 hours ago. FINDINGS: Subcapital right proximal femur fracture with about 2 cm of proximal migration of distal fracture fragment. Pelvis appears intact. Status post left total hip replacement. Subcapital right femur fracture as described.          ASSESSMENT:    Patient Active Problem List   Diagnosis    Hypothyroidism    Osteoporosis    Esophageal reflux    Cataract    Hyperlipidemia    Osteoarthritis    Anemia    Allergic rhinitis    Asthma    Rheumatoid arthritis ([address]    Methotrexate lung    Benign essential HTN    Cushing syndrome ([address]    Closed right hip fracture, initial encounter ([address]    Closed fracture of neck of right femur with routine healing      non syncopal fall   rt hip fx     asthna     baseline     steroid dependent        bp elevated      From pain   will follow      No diarrhea    Muscle spasms     ekg  nst   PLAN:  Iv lopressor      Cleared for  Surgery     moderate risk   post op aerosols and incentive

## 2019-05-16 NOTE — H&P
History and Physical        CHIEF COMPLAINT:  Right hip apin    HISTORY OF PRESENT ILLNESS:      The patient is a 80 y.o. female  who presents with sever Ra with fall yesterday acute right hip pain inability to walk    Past Medical History:    Past Medical History:   Diagnosis Date    Allergic rhinitis     Anemia     Anxiety     Asthma     Cataract     COPD (chronic obstructive pulmonary disease) (HCC)     CTS (carpal tunnel syndrome)     Esophageal reflux     Headache(784.0)     Hyperlipidemia     Hypertension     Hypothyroidism     Osteoarthritis     Osteoporosis     Rheumatoid arthritis(714.0)        Past Surgical History:    Past Surgical History:   Procedure Laterality Date    CATARACT REMOVAL WITH IMPLANT Bilateral     CYSTOCELE REPAIR      DILATION AND CURETTAGE OF UTERUS      four times    FOOT SURGERY      twice due to infection    HAMMER TOE SURGERY      right foot    HEMORRHOID SURGERY      HYSTERECTOMY, VAGINAL      INGUINAL HERNIA REPAIR      x2    JOINT REPLACEMENT Left     left hip    OTHER SURGICAL HISTORY      joint replacement right big toe    OTHER SURGICAL HISTORY      tumor removal from toe - pt not sure which toe    ROTATOR CUFF REPAIR Left     also frozen shoulder    TONSILLECTOMY AND ADENOIDECTOMY         Medications Prior to Admission:   Prior to Admission medications    Medication Sig Start Date End Date Taking?  Authorizing Provider   furosemide (LASIX) 20 MG tablet Take 1 tablet by mouth daily 3/25/19  Yes Javon Vaughn MD   acetaminophen (TYLENOL) 325 MG tablet Take 650 mg by mouth every 6 hours as needed for Pain   Yes Historical Provider, MD   hydrochlorothiazide (HYDRODIURIL) 25 MG tablet TAKE 1 TABLET BY MOUTH EVERY DAY 1/8/19  Yes Javon Vaughn MD   olmesartan (BENICAR) 40 MG tablet Take 1 tablet by mouth daily 12/7/18  Yes Javon Vaughn MD   levothyroxine (SYNTHROID) 50 MCG tablet Take 1 tablet by mouth daily 12/7/18  Yes Flaca Block Danell Habermann, MD   aspirin 81 MG tablet Take 81 mg by mouth daily   Yes Historical Provider, MD   fluticasone Marlinda Salinas) 50 MCG/ACT nasal spray  2/18/18  Yes Historical Provider, MD   neomycin-polymyxin-dexameth [id number] [address] [place] [address]  2/1/18  Yes Historical Provider, MD   oxyCODONE-acetaminophen (PERCOCET)  MG per tablet take 1 tablet by mouth every 6 hours if needed 12/14/17  Yes Historical Provider, MD   vitamin D (ERGOCALCIFEROL) 82276 UNITS CAPS capsule  3/2/17  Yes Historical Provider, MD   clonazePAM (KLONOPIN) 0.5 MG tablet Take 1 tablet by mouth nightly as needed for Anxiety 12/7/16  Yes Joann Atkins MD   predniSONE (DELTASONE) 5 MG tablet take 1 tablet by mouth once daily 5/2/16  Yes Historical Provider, MD   clobetasol (TEMOVATE) 0.05 % ointment Apply topically 2 times daily Apply topically 2 times daily. Yes Historical Provider, MD   hydroxychloroquine (PLAQUENIL) 200 MG tablet  7/31/15  Yes Historical Provider, MD   [facility] 120 METERED DOSES 220 MCG/INH inhaler  7/31/15  Yes Historical Provider, MD   bacitracin-polymyxin b (POLYSPORIN) 500-26290 UNIT/GM ophthalmic ointment  7/1/15  Yes Historical Provider, MD   RABEprazole (ACIPHEX) 20 MG tablet  7/6/15  Yes Historical Provider, MD   PROAIR  (90 BASE) MCG/ACT inhaler  4/9/15  Yes Historical Provider, MD   clotrimazole (LOTRIMIN) 1 % cream apply externally three times a day if needed 3/6/15  Yes Joann Atkins MD   nystatin (MYCOSTATIN) 247142 UNIT/GM powder Apply 3 times daily. 12/11/14  Yes Joann Atkins MD   Multiple Vitamins-Minerals (MULTIVITAMIN PO) Take  by mouth daily. Yes Historical Provider, MD   polyethylene glycol (MIRALAX) PACK packet Take 17 g by mouth daily. Yes Historical Provider, MD   mometasone (NASONEX) 50 MCG/ACT nasal spray 2 sprays by Nasal route daily as needed. Yes Historical Provider, MD   beta carotene ([employer]S BETA CAROTENE) 26966 UNITS capsule Take 25,000 Units by mouth daily.    Yes Historical Provider, MD   Calcium Carbonate-Vit D-Min (CALTRATE 600+D PLUS MINERALS) 600-800 MG-UNIT TABS Take  by mouth daily. Yes Historical Provider, MD   desloratadine (CLARINEX) 5 MG tablet Take 5 mg by mouth daily. Yes Historical Provider, MD   Omega-3 Fatty Acids (FISH OIL) 1200 MG CAPS Take  by mouth daily. Yes Historical Provider, MD   GARLIC Take  by mouth daily. Yes Historical Provider, MD   leflunomide (ARAVA) 10 MG tablet Take 10 mg by mouth daily. Yes Historical Provider, MD   simethicone (MYLANTA GAS) 125 MG chewable tablet Take 125 mg by mouth every 6 hours as needed. Yes Historical Provider, MD   zoledronic acid (RECLAST) 5 MG/100ML SOLN Infuse 5 mg intravenously once. Yearly   Yes Historical Provider, MD   ascorbic acid (VITAMIN C) 500 MG tablet Take 500 mg by mouth daily. Yes Historical Provider, MD   vitamin B-12 (CYANOCOBALAMIN) 500 MCG tablet Take 500 mcg by mouth daily. Yes Historical Provider, MD   b complex vitamins capsule Take 1 capsule by mouth daily. Yes Historical Provider, MD   Biotin 1000 MCG TABS Take  by mouth daily. Yes Historical Provider, MD   formoterol (PERFOROMIST) 20 MCG/2ML nebulizer solution Take 20 mcg by nebulization as needed.    Yes Historical Provider, MD    Scheduled Meds:   sodium chloride flush  10 mL Intravenous 2 times per day    enoxaparin  40 mg Subcutaneous Daily     Continuous Infusions:  PRN Meds:.sodium chloride flush, acetaminophen, hydrALAZINE, morphine **OR** morphine      Allergies:  No known allergies    Social History:   Social History     Socioeconomic History    Marital status:      Spouse name: None    Number of children: None    Years of education: None    Highest education level: None   Occupational History    None   Social Needs    Financial resource strain: None    Food insecurity:     Worry: None     Inability: None    Transportation needs:     Medical: None     Non-medical: None   Tobacco Use    Smoking status: Never Smoker    Smokeless tobacco: Never Used   Substance and Sexual Activity    Alcohol use: No     Alcohol/week: 0.0 oz    Drug use: No    Sexual activity: None   Lifestyle    Physical activity:     Days per week: None     Minutes per session: None    Stress: None   Relationships    Social connections:     Talks on phone: None     Gets together: None     Attends [employer] service: None     Active member of club or organization: None     Attends meetings of clubs or organizations: None     Relationship status: None    Intimate partner violence:     Fear of current or ex partner: None     Emotionally abused: None     Physically abused: None     Forced sexual activity: None   Other Topics Concern    None   Social History Narrative    None     Social History     Tobacco Use   Smoking Status Never Smoker   Smokeless Tobacco Never Used     Social History     Substance and Sexual Activity   Alcohol Use No    Alcohol/week: 0.0 oz     Social History     Substance and Sexual Activity   Drug Use No       Family History:  Family History   Problem Relation Age of Onset    Cancer Mother     Hypertension Mother     Heart Disease Mother     Stroke Maternal Grandmother     Heart Disease Maternal Grandmother     Cancer Paternal Grandmother     Heart Disease Paternal Grandmother     Heart Disease Father     Heart Disease Maternal Grandfather     Heart Disease Paternal Grandfather          REVIEW OF SYSTEMS:  Gen: Negative for nausea, vomiting, diarrhea, fever, chills, night sweats  Heart: Negative for HTN, palpitations, chest pain  Lungs: Negative for wheezes, asthma or SOB  GI: Negative for nausea, vomiting  Endo: Negative for diabetes  Heme: Negative for DVT       PHYSICAL EXAM:  Patient Vitals for the past 24 hrs:   BP Temp Temp src Pulse Resp SpO2 Height Weight   05/16/19 0545 (!) 178/85 -- -- 93 -- -- -- --   05/16/19 0432 (!) 189/87 97.5 °F (36.4 °C) Oral 97 18 97 % 4' 11\" (1.499 m) 132 lb 4.4 oz (60 kg)

## 2019-05-16 NOTE — PROGRESS NOTES
Pt was able to swallow ativan, however, pt insisted on taking medication lying down. Writer was not comfortable with giving other oral medications at this time.

## 2019-05-16 NOTE — ED PROVIDER NOTES
large abdominal masses, non tender  MSK: No midline back pain, no large joint effusions, exquisitely tender in the right groin, neurovascular intact  Neuro: Alert and oriented, no focal neurological deficits observed  Psych: Cooperative, appropriate mood and affect  -----------------------  -----------------------  MEDICAL DECISION MAKING  Differential Diagnosis:  - Consideration is given for  arterial injury, nerve injury, ligament injury, tendon injury, fracture, infected joint, compartment syndrome, dislocation, open fracture, pulmonary embolism, ACS, CVA, cardiac arrhythmia, dehydration, dissection, epidural hematoma, subarachnoid hemorrhage, skull fracture, basilar skull fracture, septal hematoma, concussion, spinal fracture, occular injury,     -  #Impression/Plan:  - Clinically patient's presentation is most consistent with  mechanical fall and right hip fracture. Given patient's presentation will get laboratory testing and imaging to evaluate for fracture. Given the fact that she does not know how she fell will evaluate for ACS. However clinically low suspicion at this time.  -  ##Reevaluation/Conversations on care:  - ekg non ischemic  - beeks to admit, yohan on consult  ##Diagnosis:  -Right hip pain  -  -----------------------  -----------------------  Blanca Castillo MD, [facility] [address]  Emergency Medicine Attending  Questions? Please contact my cell phone anytime.   [phone]  *This charting supersedes any ED resident or staff charting and was written using speech recognition software  PASTMEDICAL HISTORY     Past Medical History:   Diagnosis Date    Allergic rhinitis     Anemia     Anxiety     Asthma     Cataract     COPD (chronic obstructive pulmonary disease) ([facility] [address]     CTS (carpal tunnel syndrome)     Esophageal reflux     Headache(784.0)     Hyperlipidemia     Hypertension     Hypothyroidism     Osteoarthritis     Osteoporosis     Rheumatoid arthritis(714.0)      SURGICAL HISTORY       Past topically 2 times daily Apply topically 2 times daily. hydroxychloroquine (PLAQUENIL) 200 MG tablet   Refills: 0      ASMANEX 120 METERED DOSES 220 MCG/INH inhaler   Refills: 0      bacitracin-polymyxin b (POLYSPORIN) 500-97614 UNIT/GM ophthalmic ointment   Refills: 0      RABEprazole (ACIPHEX) 20 MG tablet   Refills: 0      PROAIR  (90 BASE) MCG/ACT inhaler Refills: 0      clotrimazole (LOTRIMIN) 1 % cream apply externally three times a day if needed  Qty: 45 g, Refills: 2    Associated Diagnoses: Rash      nystatin (MYCOSTATIN) 978959 UNIT/GM powder Apply 3 times daily. Qty: 1 Bottle, Refills: 3    Associated Diagnoses: Skin yeast infection      Multiple Vitamins-Minerals (MULTIVITAMIN PO) Take  by mouth daily. polyethylene glycol (MIRALAX) PACK packet Take 17 g by mouth daily. mometasone (NASONEX) 50 MCG/ACT nasal spray 2 sprays by Nasal route daily as needed. beta carotene (CVS BETA CAROTENE) 80045 UNITS capsule Take 25,000 Units by mouth daily. Calcium Carbonate-Vit D-Min (CALTRATE 600+D PLUS MINERALS) 600-800 MG-UNIT TABS Take  by mouth daily. desloratadine (CLARINEX) 5 MG tablet Take 5 mg by mouth daily. Omega-3 Fatty Acids (FISH OIL) 1200 MG CAPS Take  by mouth daily. GARLIC Take  by mouth daily. leflunomide (ARAVA) 10 MG tablet Take 10 mg by mouth daily. simethicone (MYLANTA GAS) 125 MG chewable tablet Take 125 mg by mouth every 6 hours as needed. zoledronic acid (RECLAST) 5 MG/100ML SOLN Infuse 5 mg intravenously once. Yearly      ascorbic acid (VITAMIN C) 500 MG tablet Take 500 mg by mouth daily. vitamin B-12 (CYANOCOBALAMIN) 500 MCG tablet Take 500 mcg by mouth daily. b complex vitamins capsule Take 1 capsule by mouth daily. Biotin 1000 MCG TABS Take  by mouth daily. formoterol (PERFOROMIST) 20 MCG/2ML nebulizer solution Take 20 mcg by nebulization as needed.            ALLERGIES     is allergic to no known allergies. FAMILY HISTORY     indicated that the status of her mother is unknown. She indicated that the status of her father is unknown. She indicated that the status of her maternal grandmother is unknown. She indicated that the status of her maternal grandfather is unknown. She indicated that the status of her paternal grandmother is unknown. She indicated that the status of her paternal grandfather is unknown.      SOCIAL HISTORY       Social History     Tobacco Use    Smoking status: Never Smoker    Smokeless tobacco: Never Used   Substance Use Topics    Alcohol use: No     Alcohol/week: 0.0 oz    Drug use: No     PHYSICAL EXAM     INITIAL VITALS: BP (!) 204/88   Pulse 100   Temp 97.8 °F (36.6 °C) (Oral)   Resp 16   Ht 4' 11\" (1.499 m)   Wt 131 lb (59.4 kg)   SpO2 94%   BMI 26.46 kg/m²    Physical Exam    MEDICAL DECISION MAKING:            Labs Reviewed   CBC WITH AUTO DIFFERENTIAL - Abnormal; Notable for the following components:       Result Value    Hemoglobin 11.7 (*)     Hematocrit 35.6 (*)     RDW 17.2 (*)     Seg Neutrophils 78 (*)     Lymphocytes 12 (*)     Monocytes 8 (*)     Absolute Lymph # 0.90 (*)     All other components within normal limits   COMPREHENSIVE METABOLIC PANEL W/ REFLEX TO MG FOR LOW K - Abnormal; Notable for the following components:    Glucose 148 (*)     Total Bilirubin 0.27 (*)     All other components within normal limits   TROPONIN - Abnormal; Notable for the following components:    Troponin, High Sensitivity 16 (*)     All other components within normal limits   PROTIME-INR   APTT   TROPONIN   TYPE AND SCREEN     EMERGENCY DEPARTMENTCOURSE:         Vitals:    Vitals:    05/16/19 0045 05/16/19 0100 05/16/19 0130 05/16/19 0334   BP: (!) 173/152 (!) 193/87  (!) 204/88   Pulse:   93 100   Resp:    16   Temp:    97.8 °F (36.6 °C)   TempSrc:    Oral   SpO2: 98% 98%  94%   Weight:       Height:           The patient was given the following medications while in the emergency department:  Orders Placed This Encounter   Medications    HYDROmorphone (DILAUDID) injection 0.5 mg    LORazepam (ATIVAN) injection 1 mg    sodium chloride flush 0.9 % injection 10 mL    sodium chloride flush 0.9 % injection 10 mL    acetaminophen (TYLENOL) tablet 650 mg    enoxaparin (LOVENOX) injection 40 mg     CONSULTS:  IP CONSULT TO INTERNAL MEDICINE  IP CONSULT TO ORTHOPEDIC SURGERY    FINAL IMPRESSION      1.  Closed fracture of right hip, initial encounter [facility])          DISPOSITION/PLAN   DISPOSITION Admitted 05/16/2019 02:54:11 AM      PATIENT REFERRED TO:  Sherrill Shelton MD  [address] [place] [address]  [phone]          DISCHARGE MEDICATIONS:  Current Discharge Medication List        Tamika Harrison MD  Attending Emergency Physician                    Briseida Ng MD  05/16/19 7667

## 2019-05-16 NOTE — CARE COORDINATION
JUDY received info that this patient's  would like her to go to the ARU when she is ready for DC. JUDY did send the referral to the ARU. JUDY will follow up with Lilian in admissions. JUDY following.

## 2019-05-16 NOTE — ED NOTES
Report given to Shayy Gunter RN from 2101 . Report method by phone [phone]   The following was reviewed with receiving RN:hip fx    Current vital signs:  BP (!) 204/88   Pulse 100   Temp 97.8 °F (36.6 °C) (Oral)   Resp 16   Ht 4' 11\" (1.499 m)   Wt 131 lb (59.4 kg)   SpO2 94%   BMI 26.46 kg/m²                      Any medication or safety alerts were reviewed. Any pending diagnostics and notifications were also reviewed, as well as any safety concerns or issues, abnormal labs, abnormal imaging, and abnormal assessment findings. Questions were answered.           Donal Garcia RN  05/16/19 5870

## 2019-05-16 NOTE — PROGRESS NOTES
Pt screaming out in extreme pain. Ice pack provided for comfort. Dr. Odette Pineda notified via perfect serve. Awaiting return call.  Will continue to monitor

## 2019-05-16 NOTE — ANESTHESIA PRE PROCEDURE
Department of Anesthesiology  Preprocedure Note       Name:  Karolina Alves   Age:  80 y.o.  :  1935                                          MRN:  [id number]         Date:  2019      Surgeon: Severiano España):  Karl Treviño MD    Procedure: HIP HEMIARTHROPLASTY (Right )    Medications prior to admission:   Prior to Admission medications    Medication Sig Start Date End Date Taking? Authorizing Provider   furosemide (LASIX) 20 MG tablet Take 1 tablet by mouth daily 3/25/19  Yes Juan Pandey MD   acetaminophen (TYLENOL) 325 MG tablet Take 650 mg by mouth every 6 hours as needed for Pain   Yes Historical Provider, MD   hydrochlorothiazide (HYDRODIURIL) 25 MG tablet TAKE 1 TABLET BY MOUTH EVERY DAY 19  Yes Juan Pandey MD   olmesartan (BENICAR) 40 MG tablet Take 1 tablet by mouth daily 18  Yes Juan Pandey MD   levothyroxine (SYNTHROID) 50 MCG tablet Take 1 tablet by mouth daily 18  Yes Juan Pandey MD   aspirin 81 MG tablet Take 81 mg by mouth daily   Yes Historical Provider, MD   fluticasone (FLONASE) 50 MCG/ACT nasal spray  18  Yes Historical Provider, MD   neomycin-polymyxin-dexameth [id number] [address] [place] [address]  18  Yes Historical Provider, MD   oxyCODONE-acetaminophen (PERCOCET)  MG per tablet take 1 tablet by mouth every 6 hours if needed 17  Yes Historical Provider, MD   vitamin D (ERGOCALCIFEROL) 13927 UNITS CAPS capsule  3/2/17  Yes Historical Provider, MD   clonazePAM (KLONOPIN) 0.5 MG tablet Take 1 tablet by mouth nightly as needed for Anxiety 16  Yes Juan Pandey MD   predniSONE (DELTASONE) 5 MG tablet take 1 tablet by mouth once daily 16  Yes Historical Provider, MD   clobetasol (TEMOVATE) 0.05 % ointment Apply topically 2 times daily Apply topically 2 times daily.    Yes Historical Provider, MD   hydroxychloroquine (PLAQUENIL) 200 MG tablet  7/31/15  Yes Historical Provider, MD   [facility] 120 METERED DOSES 220 MCG/INH inhaler  7/31/15 Yes Historical Provider, MD   bacitracin-polymyxin b (POLYSPORIN) 500-20577 UNIT/GM ophthalmic ointment  7/1/15  Yes Historical Provider, MD   RABEprazole (ACIPHEX) 20 MG tablet  7/6/15  Yes Historical Provider, MD   PROAIR  (90 BASE) MCG/ACT inhaler  4/9/15  Yes Historical Provider, MD   clotrimazole (LOTRIMIN) 1 % cream apply externally three times a day if needed 3/6/15  Yes Donnie Damon MD   nystatin (MYCOSTATIN) 008365 UNIT/GM powder Apply 3 times daily. 12/11/14  Yes Donnie Damon MD   Multiple Vitamins-Minerals (MULTIVITAMIN PO) Take  by mouth daily. Yes Historical Provider, MD   polyethylene glycol (MIRALAX) PACK packet Take 17 g by mouth daily. Yes Historical Provider, MD   mometasone (NASONEX) 50 MCG/ACT nasal spray 2 sprays by Nasal route daily as needed. Yes Historical Provider, MD   beta carotene ([employer] BETA CAROTENE) 42159 UNITS capsule Take 25,000 Units by mouth daily. Yes Historical Provider, MD   Calcium Carbonate-Vit D-Min (CALTRATE 600+D PLUS MINERALS) 600-800 MG-UNIT TABS Take  by mouth daily. Yes Historical Provider, MD   desloratadine (CLARINEX) 5 MG tablet Take 5 mg by mouth daily. Yes Historical Provider, MD   Omega-3 Fatty Acids (FISH OIL) 1200 MG CAPS Take  by mouth daily. Yes Historical Provider, MD   GARLIC Take  by mouth daily. Yes Historical Provider, MD   leflunomide (ARAVA) 10 MG tablet Take 10 mg by mouth daily. Yes Historical Provider, MD   simethicone (MYLANTA GAS) 125 MG chewable tablet Take 125 mg by mouth every 6 hours as needed. Yes Historical Provider, MD   zoledronic acid (RECLAST) 5 MG/100ML SOLN Infuse 5 mg intravenously once. Yearly   Yes Historical Provider, MD   ascorbic acid (VITAMIN C) 500 MG tablet Take 500 mg by mouth daily. Yes Historical Provider, MD   vitamin B-12 (CYANOCOBALAMIN) 500 MCG tablet Take 500 mcg by mouth daily. Yes Historical Provider, MD   b complex vitamins capsule Take 1 capsule by mouth daily.    Yes Historical Provider, MD   Biotin 1000 MCG TABS Take  by mouth daily. Yes Historical Provider, MD   formoterol (PERFOROMIST) 20 MCG/2ML nebulizer solution Take 20 mcg by nebulization as needed. Yes Historical Provider, MD       Current medications:    Current Facility-Administered Medications   Medication Dose Route Frequency Provider Last Rate Last Dose    sodium chloride flush 0.9 % injection 10 mL  10 mL Intravenous 2 times per day Nilam Costello MD   10 mL at 05/16/19 0732    sodium chloride flush 0.9 % injection 10 mL  10 mL Intravenous PRN Nilam Costello MD        acetaminophen (TYLENOL) tablet 650 mg  650 mg Oral Q4H PRN Nilam Costello MD        enoxaparin (LOVENOX) injection 40 mg  40 mg Subcutaneous Daily Nilam Costello MD   Stopped at 05/16/19 9686    hydrALAZINE (APRESOLINE) injection 20 mg  20 mg Intravenous Q4H PRN Alan Navarro MD   20 mg at 05/16/19 0555    metoprolol (LOPRESSOR) injection 5 mg  5 mg Intravenous Q6H Claudia Viramontes MD   5 mg at 05/16/19 1401    LORazepam (ATIVAN) tablet 0.5 mg  0.5 mg Oral Q4H PRN Hosea Serra MD   0.5 mg at 05/16/19 1400    levothyroxine (SYNTHROID) tablet 50 mcg  50 mcg Oral Daily Hosea Serra MD        losartan (COZAAR) tablet 25 mg  25 mg Oral Daily Hosea Serra MD        [START ON 5/17/2019] pantoprazole (PROTONIX) tablet 40 mg  40 mg Oral QAM AC Hosea Serra MD        hydrocortisone sodium succinate PF (SOLU-CORTEF) injection 50 mg  50 mg Intravenous Q8H Claudia Viramontes MD   50 mg at 05/16/19 1401    ipratropium-albuterol (DUONEB) nebulizer solution 1 ampule  1 ampule Inhalation Q4H WA Hosea Serra MD        lidocaine 4 % external patch 1 patch  1 patch Transdermal Daily Hosea Serra MD   1 patch at 05/16/19 1646    HYDROmorphone (DILAUDID) injection 2 mg  2 mg Intravenous Q3H PRN Hosea Serra MD   2 mg at 05/16/19 1646       Allergies:     Allergies   Allergen Reactions    No Known Allergies      Other reaction(s): Unknown       Problem List:    Patient Active Problem List   Diagnosis Code    Hypothyroidism E03.9    Osteoporosis M81.0    Esophageal reflux K21.9    Cataract H26.9    Hyperlipidemia E78.5    Osteoarthritis M19.90    Anemia D64.9    Allergic rhinitis J30.9    Asthma J45.909    Rheumatoid arthritis ([facility] [address] M06.9    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, T45.1X5A    Benign essential HTN I10    Cushing syndrome ([facility] [address] E24.9    Closed fracture of right hip ([facility] [address] S72.001A    Closed fracture of neck of right femur with routine healing S72.001D       Past Medical History:        Diagnosis Date    Allergic rhinitis     Anemia     Anxiety     Asthma     Cataract     COPD (chronic obstructive pulmonary disease) (HCC)     CTS (carpal tunnel syndrome)     Esophageal reflux     Headache(784.0)     Hyperlipidemia     Hypertension     Hypothyroidism     Osteoarthritis     Osteoporosis     Rheumatoid arthritis(714.0)        Past Surgical History:        Procedure Laterality Date    CATARACT REMOVAL WITH IMPLANT Bilateral     CYSTOCELE REPAIR      DILATION AND CURETTAGE OF UTERUS      four times    FOOT SURGERY      twice due to infection    HAMMER TOE SURGERY      right foot    HEMORRHOID SURGERY      HYSTERECTOMY, VAGINAL      INGUINAL HERNIA REPAIR      x2    JOINT REPLACEMENT Left     left hip    OTHER SURGICAL HISTORY      joint replacement right big toe    OTHER SURGICAL HISTORY      tumor removal from toe - pt not sure which toe    ROTATOR CUFF REPAIR Left     also frozen shoulder    TONSILLECTOMY AND ADENOIDECTOMY         Social History:    Social History     Tobacco Use    Smoking status: Never Smoker    Smokeless tobacco: Never Used   Substance Use Topics    Alcohol use: No     Alcohol/week: 0.0 oz                                Counseling given: Not Answered      Vital Signs (Current):   Vitals:    05/16/19 0545 05/16/19 0924 05/16/19 1204 05/16/19 1508   BP: (!) 178/85 (!) 164/74 (!) 154/68 (!) 153/74   Pulse: 93 86 96 85   Resp:  16 20 24   Temp:  98.1 °F (36.7 °C) 97.5 °F (36.4 °C) 97.2 °F (36.2 °C)   TempSrc:  Axillary Oral Oral   SpO2:  100% 92% 92%   Weight:       Height:                                                  BP Readings from Last 3 Encounters:   05/16/19 (!) 153/74   01/28/19 124/76   12/14/18 130/80       NPO Status:                                                                                 BMI:   Wt Readings from Last 3 Encounters:   05/16/19 132 lb 4.4 oz (60 kg)   01/28/19 137 lb (62.1 kg)   12/14/18 136 lb (61.7 kg)     Body mass index is 26.72 kg/m². CBC:   Lab Results   Component Value Date    WBC 7.7 05/16/2019    RBC 4.26 05/16/2019    RBC 3.31 11/16/2011    HGB 11.4 05/16/2019    HCT 35.6 05/16/2019    MCV 83.5 05/16/2019    RDW 16.6 05/16/2019     05/16/2019     11/16/2011       CMP:   Lab Results   Component Value Date     05/16/2019    K 3.4 05/16/2019    CL 98 05/16/2019    CO2 23 05/16/2019    BUN 9 05/16/2019    CREATININE <0.40 05/16/2019    GFRAA CANNOT BE CALCULATED 05/16/2019    LABGLOM CANNOT BE CALCULATED 05/16/2019    GLUCOSE 129 05/16/2019    GLUCOSE 86 10/11/2011    PROT 7.2 05/16/2019    CALCIUM 8.5 05/16/2019    BILITOT 0.27 05/16/2019    ALKPHOS 99 05/16/2019    AST 24 05/16/2019    ALT 15 05/16/2019       POC Tests: No results for input(s): POCGLU, POCNA, POCK, POCCL, POCBUN, POCHEMO, POCHCT in the last 72 hours.     Coags:   Lab Results   Component Value Date    PROTIME 13.8 05/16/2019    PROTIME 9.8 10/11/2011    INR 1.1 05/16/2019    APTT 27.6 05/16/2019       HCG (If Applicable): No results found for: PREGTESTUR, PREGSERUM, HCG, HCGQUANT     ABGs: No results found for: PHART, PO2ART, DIC6GRA, ONG6TKH, BEART, J9GFKDNC     Type & Screen (If Applicable):  No results found for: LABABO, [address]    Anesthesia Evaluation  Patient summary reviewed and Nursing notes reviewed no history of anesthetic complications:   Airway: Mallampati: II  TM distance: >3 FB   Neck ROM: full  Mouth opening: > = 3 FB Dental: normal exam         Pulmonary:normal exam    (+) COPD:  asthma:                            Cardiovascular:    (+) hypertension:, dysrhythmias (PVCs):,       ECG reviewed  Rhythm: regular  Rate: normal                    Neuro/Psych:   (+) neuromuscular disease:, headaches:,              ROS comment: H/o Fall with Rt femur fracture GI/Hepatic/Renal:   (+) GERD:,           Endo/Other:    (+) hypothyroidism: arthritis: OA, no interval change and rheumatoid. , electrolyte abnormalities (Low Potassium (3.4) - being replaced), .                  ROS comment: osteoporosis Abdominal:           Vascular: negative vascular ROS. Anesthesia Plan      general     ASA 3     (Medical clearance obtained)  Induction: intravenous. MIPS: Postoperative opioids intended and Prophylactic antiemetics administered. Anesthetic plan and risks discussed with patient. Plan discussed with CRNA.                 Audra Leon MD   5/16/2019

## 2019-05-17 ENCOUNTER — APPOINTMENT (OUTPATIENT)
Dept: GENERAL RADIOLOGY | Age: 84
DRG: 470 | End: 2019-05-17
Payer: MEDICARE

## 2019-05-17 ENCOUNTER — ANESTHESIA (OUTPATIENT)
Dept: OPERATING ROOM | Age: 84
DRG: 470 | End: 2019-05-17
Payer: MEDICARE

## 2019-05-17 VITALS — TEMPERATURE: 98.4 F | SYSTOLIC BLOOD PRESSURE: 184 MMHG | DIASTOLIC BLOOD PRESSURE: 86 MMHG | OXYGEN SATURATION: 99 %

## 2019-05-17 LAB
ABO/RH: NORMAL
ANTIBODY SCREEN: NEGATIVE
ARM BAND NUMBER: NORMAL
EXPIRATION DATE: NORMAL

## 2019-05-17 PROCEDURE — 7100000000 HC PACU RECOVERY - FIRST 15 MIN: Performed by: ORTHOPAEDIC SURGERY

## 2019-05-17 PROCEDURE — 3600000004 HC SURGERY LEVEL 4 BASE: Performed by: ORTHOPAEDIC SURGERY

## 2019-05-17 PROCEDURE — 6360000002 HC RX W HCPCS: Performed by: INTERNAL MEDICINE

## 2019-05-17 PROCEDURE — 3600000014 HC SURGERY LEVEL 4 ADDTL 15MIN: Performed by: ORTHOPAEDIC SURGERY

## 2019-05-17 PROCEDURE — 2580000003 HC RX 258: Performed by: ORTHOPAEDIC SURGERY

## 2019-05-17 PROCEDURE — 2500000003 HC RX 250 WO HCPCS: Performed by: ANESTHESIOLOGY

## 2019-05-17 PROCEDURE — 2709999900 HC NON-CHARGEABLE SUPPLY: Performed by: ORTHOPAEDIC SURGERY

## 2019-05-17 PROCEDURE — 2580000003 HC RX 258: Performed by: ANESTHESIOLOGY

## 2019-05-17 PROCEDURE — 27236 TREAT THIGH FRACTURE: CPT | Performed by: ORTHOPAEDIC SURGERY

## 2019-05-17 PROCEDURE — 3700000001 HC ADD 15 MINUTES (ANESTHESIA): Performed by: ORTHOPAEDIC SURGERY

## 2019-05-17 PROCEDURE — 0SRR0JA REPLACEMENT OF RIGHT HIP JOINT, FEMORAL SURFACE WITH SYNTHETIC SUBSTITUTE, UNCEMENTED, OPEN APPROACH: ICD-10-PCS | Performed by: ORTHOPAEDIC SURGERY

## 2019-05-17 PROCEDURE — 2500000003 HC RX 250 WO HCPCS: Performed by: NURSE ANESTHETIST, CERTIFIED REGISTERED

## 2019-05-17 PROCEDURE — 2500000003 HC RX 250 WO HCPCS: Performed by: ORTHOPAEDIC SURGERY

## 2019-05-17 PROCEDURE — 86901 BLOOD TYPING SEROLOGIC RH(D): CPT

## 2019-05-17 PROCEDURE — 97530 THERAPEUTIC ACTIVITIES: CPT

## 2019-05-17 PROCEDURE — 94640 AIRWAY INHALATION TREATMENT: CPT

## 2019-05-17 PROCEDURE — 73501 X-RAY EXAM HIP UNI 1 VIEW: CPT

## 2019-05-17 PROCEDURE — 2500000003 HC RX 250 WO HCPCS: Performed by: INTERNAL MEDICINE

## 2019-05-17 PROCEDURE — 6360000002 HC RX W HCPCS: Performed by: ANESTHESIOLOGY

## 2019-05-17 PROCEDURE — 97166 OT EVAL MOD COMPLEX 45 MIN: CPT

## 2019-05-17 PROCEDURE — 6370000000 HC RX 637 (ALT 250 FOR IP): Performed by: ORTHOPAEDIC SURGERY

## 2019-05-17 PROCEDURE — 6360000002 HC RX W HCPCS: Performed by: ORTHOPAEDIC SURGERY

## 2019-05-17 PROCEDURE — 36415 COLL VENOUS BLD VENIPUNCTURE: CPT

## 2019-05-17 PROCEDURE — 2700000000 HC OXYGEN THERAPY PER DAY

## 2019-05-17 PROCEDURE — 7100000001 HC PACU RECOVERY - ADDTL 15 MIN: Performed by: ORTHOPAEDIC SURGERY

## 2019-05-17 PROCEDURE — 86850 RBC ANTIBODY SCREEN: CPT

## 2019-05-17 PROCEDURE — 1200000000 HC SEMI PRIVATE

## 2019-05-17 PROCEDURE — 99232 SBSQ HOSP IP/OBS MODERATE 35: CPT | Performed by: INTERNAL MEDICINE

## 2019-05-17 PROCEDURE — 94761 N-INVAS EAR/PLS OXIMETRY MLT: CPT

## 2019-05-17 PROCEDURE — 3700000000 HC ANESTHESIA ATTENDED CARE: Performed by: ORTHOPAEDIC SURGERY

## 2019-05-17 PROCEDURE — 86900 BLOOD TYPING SEROLOGIC ABO: CPT

## 2019-05-17 PROCEDURE — 97162 PT EVAL MOD COMPLEX 30 MIN: CPT

## 2019-05-17 PROCEDURE — 6360000002 HC RX W HCPCS: Performed by: NURSE ANESTHETIST, CERTIFIED REGISTERED

## 2019-05-17 PROCEDURE — C1776 JOINT DEVICE (IMPLANTABLE): HCPCS | Performed by: ORTHOPAEDIC SURGERY

## 2019-05-17 DEVICE — INSERT FEM NK L-6MM TAPR HIP CO CHROM MOLYBDENUM ALLY ENDO: Type: IMPLANTABLE DEVICE | Site: HIP | Status: FUNCTIONAL

## 2019-05-17 DEVICE — SHELL UPLR DIA43MM FEM HIP CO CHROM MOLYBDENUM ALLY MOD: Type: IMPLANTABLE DEVICE | Site: HIP | Status: FUNCTIONAL

## 2019-05-17 DEVICE — IMPLANTABLE DEVICE: Type: IMPLANTABLE DEVICE | Site: HIP | Status: FUNCTIONAL

## 2019-05-17 RX ORDER — LIDOCAINE HYDROCHLORIDE 10 MG/ML
INJECTION, SOLUTION EPIDURAL; INFILTRATION; INTRACAUDAL; PERINEURAL PRN
Status: DISCONTINUED | OUTPATIENT
Start: 2019-05-17 | End: 2019-05-17 | Stop reason: SDUPTHER

## 2019-05-17 RX ORDER — SODIUM CHLORIDE, SODIUM LACTATE, POTASSIUM CHLORIDE, CALCIUM CHLORIDE 600; 310; 30; 20 MG/100ML; MG/100ML; MG/100ML; MG/100ML
INJECTION, SOLUTION INTRAVENOUS CONTINUOUS
Status: DISCONTINUED | OUTPATIENT
Start: 2019-05-17 | End: 2019-05-17

## 2019-05-17 RX ORDER — FENTANYL CITRATE 50 UG/ML
INJECTION, SOLUTION INTRAMUSCULAR; INTRAVENOUS PRN
Status: DISCONTINUED | OUTPATIENT
Start: 2019-05-17 | End: 2019-05-17 | Stop reason: SDUPTHER

## 2019-05-17 RX ORDER — OXYCODONE HYDROCHLORIDE AND ACETAMINOPHEN 5; 325 MG/1; MG/1
1 TABLET ORAL PRN
Status: DISCONTINUED | OUTPATIENT
Start: 2019-05-17 | End: 2019-05-17 | Stop reason: HOSPADM

## 2019-05-17 RX ORDER — ONDANSETRON 2 MG/ML
INJECTION INTRAMUSCULAR; INTRAVENOUS PRN
Status: DISCONTINUED | OUTPATIENT
Start: 2019-05-17 | End: 2019-05-17 | Stop reason: SDUPTHER

## 2019-05-17 RX ORDER — ONDANSETRON 2 MG/ML
4 INJECTION INTRAMUSCULAR; INTRAVENOUS EVERY 6 HOURS PRN
Status: DISCONTINUED | OUTPATIENT
Start: 2019-05-17 | End: 2019-05-21 | Stop reason: HOSPADM

## 2019-05-17 RX ORDER — HYDROCODONE BITARTRATE AND ACETAMINOPHEN 5; 325 MG/1; MG/1
1 TABLET ORAL EVERY 4 HOURS PRN
Status: DISCONTINUED | OUTPATIENT
Start: 2019-05-17 | End: 2019-05-18

## 2019-05-17 RX ORDER — LABETALOL 20 MG/4 ML (5 MG/ML) INTRAVENOUS SYRINGE
5 EVERY 10 MIN PRN
Status: DISCONTINUED | OUTPATIENT
Start: 2019-05-17 | End: 2019-05-17 | Stop reason: HOSPADM

## 2019-05-17 RX ORDER — DIPHENHYDRAMINE HYDROCHLORIDE 50 MG/ML
12.5 INJECTION INTRAMUSCULAR; INTRAVENOUS
Status: DISCONTINUED | OUTPATIENT
Start: 2019-05-17 | End: 2019-05-17 | Stop reason: HOSPADM

## 2019-05-17 RX ORDER — PROMETHAZINE HYDROCHLORIDE 25 MG/ML
6.25 INJECTION, SOLUTION INTRAMUSCULAR; INTRAVENOUS
Status: DISCONTINUED | OUTPATIENT
Start: 2019-05-17 | End: 2019-05-17 | Stop reason: CLARIF

## 2019-05-17 RX ORDER — HYDROCODONE BITARTRATE AND ACETAMINOPHEN 5; 325 MG/1; MG/1
2 TABLET ORAL EVERY 4 HOURS PRN
Status: DISCONTINUED | OUTPATIENT
Start: 2019-05-17 | End: 2019-05-18

## 2019-05-17 RX ORDER — BUPIVACAINE HYDROCHLORIDE AND EPINEPHRINE 2.5; 5 MG/ML; UG/ML
INJECTION, SOLUTION EPIDURAL; INFILTRATION; INTRACAUDAL; PERINEURAL PRN
Status: DISCONTINUED | OUTPATIENT
Start: 2019-05-17 | End: 2019-05-17 | Stop reason: ALTCHOICE

## 2019-05-17 RX ORDER — DOCUSATE SODIUM 100 MG/1
100 CAPSULE, LIQUID FILLED ORAL 2 TIMES DAILY
Status: DISCONTINUED | OUTPATIENT
Start: 2019-05-17 | End: 2019-05-21 | Stop reason: HOSPADM

## 2019-05-17 RX ORDER — TRANEXAMIC ACID 100 MG/ML
INJECTION, SOLUTION INTRAVENOUS PRN
Status: DISCONTINUED | OUTPATIENT
Start: 2019-05-17 | End: 2019-05-17 | Stop reason: SDUPTHER

## 2019-05-17 RX ORDER — ROCURONIUM BROMIDE 10 MG/ML
INJECTION, SOLUTION INTRAVENOUS PRN
Status: DISCONTINUED | OUTPATIENT
Start: 2019-05-17 | End: 2019-05-17 | Stop reason: SDUPTHER

## 2019-05-17 RX ORDER — CEFAZOLIN SODIUM 1 G/3ML
INJECTION, POWDER, FOR SOLUTION INTRAMUSCULAR; INTRAVENOUS PRN
Status: DISCONTINUED | OUTPATIENT
Start: 2019-05-17 | End: 2019-05-17 | Stop reason: SDUPTHER

## 2019-05-17 RX ORDER — PROPOFOL 10 MG/ML
INJECTION, EMULSION INTRAVENOUS PRN
Status: DISCONTINUED | OUTPATIENT
Start: 2019-05-17 | End: 2019-05-17 | Stop reason: SDUPTHER

## 2019-05-17 RX ORDER — OXYCODONE HYDROCHLORIDE AND ACETAMINOPHEN 5; 325 MG/1; MG/1
2 TABLET ORAL PRN
Status: DISCONTINUED | OUTPATIENT
Start: 2019-05-17 | End: 2019-05-17 | Stop reason: HOSPADM

## 2019-05-17 RX ORDER — DEXAMETHASONE SODIUM PHOSPHATE 4 MG/ML
INJECTION, SOLUTION INTRA-ARTICULAR; INTRALESIONAL; INTRAMUSCULAR; INTRAVENOUS; SOFT TISSUE PRN
Status: DISCONTINUED | OUTPATIENT
Start: 2019-05-17 | End: 2019-05-17 | Stop reason: SDUPTHER

## 2019-05-17 RX ADMIN — Medication 10 ML: at 21:54

## 2019-05-17 RX ADMIN — PROPOFOL 100 MG: 10 INJECTION, EMULSION INTRAVENOUS at 07:39

## 2019-05-17 RX ADMIN — ROCURONIUM BROMIDE 30 MG: 10 INJECTION INTRAVENOUS at 07:39

## 2019-05-17 RX ADMIN — IPRATROPIUM BROMIDE AND ALBUTEROL SULFATE 1 AMPULE: .5; 3 SOLUTION RESPIRATORY (INHALATION) at 16:29

## 2019-05-17 RX ADMIN — LIDOCAINE HYDROCHLORIDE 25 MG: 10 INJECTION, SOLUTION EPIDURAL; INFILTRATION; INTRACAUDAL; PERINEURAL at 07:39

## 2019-05-17 RX ADMIN — HYDROMORPHONE HYDROCHLORIDE 0.25 MG: 1 INJECTION, SOLUTION INTRAMUSCULAR; INTRAVENOUS; SUBCUTANEOUS at 09:19

## 2019-05-17 RX ADMIN — ROCURONIUM BROMIDE 20 MG: 10 INJECTION INTRAVENOUS at 08:11

## 2019-05-17 RX ADMIN — CEFAZOLIN 2000 MG: 1 INJECTION, POWDER, FOR SOLUTION INTRAMUSCULAR; INTRAVENOUS at 07:56

## 2019-05-17 RX ADMIN — FENTANYL CITRATE 50 MCG: 50 INJECTION, SOLUTION INTRAMUSCULAR; INTRAVENOUS at 07:39

## 2019-05-17 RX ADMIN — TRANEXAMIC ACID 1000 MG: 100 INJECTION, SOLUTION INTRAVENOUS at 08:05

## 2019-05-17 RX ADMIN — HYDROMORPHONE HYDROCHLORIDE 0.25 MG: 1 INJECTION, SOLUTION INTRAMUSCULAR; INTRAVENOUS; SUBCUTANEOUS at 09:33

## 2019-05-17 RX ADMIN — HYDROCORTISONE SODIUM SUCCINATE 50 MG: 100 INJECTION, POWDER, FOR SOLUTION INTRAMUSCULAR; INTRAVENOUS at 16:12

## 2019-05-17 RX ADMIN — FENTANYL CITRATE 50 MCG: 50 INJECTION, SOLUTION INTRAMUSCULAR; INTRAVENOUS at 08:07

## 2019-05-17 RX ADMIN — HYDROMORPHONE HYDROCHLORIDE 2 MG: 1 INJECTION, SOLUTION INTRAMUSCULAR; INTRAVENOUS; SUBCUTANEOUS at 00:25

## 2019-05-17 RX ADMIN — HYDROCODONE BITARTRATE AND ACETAMINOPHEN 2 TABLET: 5; 325 TABLET ORAL at 21:54

## 2019-05-17 RX ADMIN — HYDROCORTISONE SODIUM SUCCINATE 50 MG: 100 INJECTION, POWDER, FOR SOLUTION INTRAMUSCULAR; INTRAVENOUS at 21:54

## 2019-05-17 RX ADMIN — HYDROMORPHONE HYDROCHLORIDE 2 MG: 1 INJECTION, SOLUTION INTRAMUSCULAR; INTRAVENOUS; SUBCUTANEOUS at 04:34

## 2019-05-17 RX ADMIN — METOPROLOL TARTRATE 5 MG: 1 INJECTION, SOLUTION INTRAVENOUS at 15:56

## 2019-05-17 RX ADMIN — PHENYLEPHRINE HYDROCHLORIDE 100 MCG: 10 INJECTION INTRAVENOUS at 08:29

## 2019-05-17 RX ADMIN — ONDANSETRON 4 MG: 2 INJECTION INTRAMUSCULAR; INTRAVENOUS at 08:42

## 2019-05-17 RX ADMIN — DOCUSATE SODIUM 100 MG: 100 CAPSULE, LIQUID FILLED ORAL at 15:52

## 2019-05-17 RX ADMIN — METOPROLOL TARTRATE 5 MG: 1 INJECTION, SOLUTION INTRAVENOUS at 06:03

## 2019-05-17 RX ADMIN — HYDROCODONE BITARTRATE AND ACETAMINOPHEN 2 TABLET: 5; 325 TABLET ORAL at 17:32

## 2019-05-17 RX ADMIN — SODIUM CHLORIDE, POTASSIUM CHLORIDE, SODIUM LACTATE AND CALCIUM CHLORIDE: 600; 310; 30; 20 INJECTION, SOLUTION INTRAVENOUS at 07:36

## 2019-05-17 RX ADMIN — DEXAMETHASONE SODIUM PHOSPHATE 4 MG: 4 INJECTION, SOLUTION INTRA-ARTICULAR; INTRALESIONAL; INTRAMUSCULAR; INTRAVENOUS; SOFT TISSUE at 08:18

## 2019-05-17 RX ADMIN — Medication 2 G: at 17:13

## 2019-05-17 RX ADMIN — HYDROCODONE BITARTRATE AND ACETAMINOPHEN 2 TABLET: 5; 325 TABLET ORAL at 13:28

## 2019-05-17 RX ADMIN — HYDROCORTISONE SODIUM SUCCINATE 50 MG: 100 INJECTION, POWDER, FOR SOLUTION INTRAMUSCULAR; INTRAVENOUS at 06:03

## 2019-05-17 RX ADMIN — IPRATROPIUM BROMIDE AND ALBUTEROL SULFATE 1 AMPULE: .5; 3 SOLUTION RESPIRATORY (INHALATION) at 11:02

## 2019-05-17 RX ADMIN — DOCUSATE SODIUM 100 MG: 100 CAPSULE, LIQUID FILLED ORAL at 21:54

## 2019-05-17 RX ADMIN — PHENYLEPHRINE HYDROCHLORIDE 200 MCG: 10 INJECTION INTRAVENOUS at 07:43

## 2019-05-17 RX ADMIN — IPRATROPIUM BROMIDE AND ALBUTEROL SULFATE 1 AMPULE: .5; 3 SOLUTION RESPIRATORY (INHALATION) at 11:01

## 2019-05-17 RX ADMIN — SUGAMMADEX 200 MG: 100 INJECTION, SOLUTION INTRAVENOUS at 08:51

## 2019-05-17 RX ADMIN — METOPROLOL TARTRATE 5 MG: 1 INJECTION, SOLUTION INTRAVENOUS at 21:54

## 2019-05-17 RX ADMIN — METOPROLOL TARTRATE 5 MG: 1 INJECTION, SOLUTION INTRAVENOUS at 02:04

## 2019-05-17 RX ADMIN — LABETALOL 20 MG/4 ML (5 MG/ML) INTRAVENOUS SYRINGE 5 MG: at 09:25

## 2019-05-17 RX ADMIN — IPRATROPIUM BROMIDE AND ALBUTEROL SULFATE 1 AMPULE: .5; 3 SOLUTION RESPIRATORY (INHALATION) at 20:19

## 2019-05-17 RX ADMIN — LOSARTAN POTASSIUM 25 MG: 25 TABLET ORAL at 15:52

## 2019-05-17 ASSESSMENT — PULMONARY FUNCTION TESTS
PIF_VALUE: 19
PIF_VALUE: 18
PIF_VALUE: 19
PIF_VALUE: 18
PIF_VALUE: 19
PIF_VALUE: 19
PIF_VALUE: 18
PIF_VALUE: 18
PIF_VALUE: 19
PIF_VALUE: 1
PIF_VALUE: 19
PIF_VALUE: 18
PIF_VALUE: 18
PIF_VALUE: 19
PIF_VALUE: 20
PIF_VALUE: 19
PIF_VALUE: 20
PIF_VALUE: 18
PIF_VALUE: 18
PIF_VALUE: 17
PIF_VALUE: 18
PIF_VALUE: 19
PIF_VALUE: 18
PIF_VALUE: 19
PIF_VALUE: 19
PIF_VALUE: 0
PIF_VALUE: 26
PIF_VALUE: 17
PIF_VALUE: 19
PIF_VALUE: 19
PIF_VALUE: 1
PIF_VALUE: 19
PIF_VALUE: 14
PIF_VALUE: 19
PIF_VALUE: 19
PIF_VALUE: 18
PIF_VALUE: 19
PIF_VALUE: 0
PIF_VALUE: 1
PIF_VALUE: 0
PIF_VALUE: 15
PIF_VALUE: 19
PIF_VALUE: 18
PIF_VALUE: 23
PIF_VALUE: 21
PIF_VALUE: 19
PIF_VALUE: 17
PIF_VALUE: 18
PIF_VALUE: 19
PIF_VALUE: 18
PIF_VALUE: 19
PIF_VALUE: 19
PIF_VALUE: 18
PIF_VALUE: 19
PIF_VALUE: 18
PIF_VALUE: 1
PIF_VALUE: 20
PIF_VALUE: 18
PIF_VALUE: 19
PIF_VALUE: 1
PIF_VALUE: 18
PIF_VALUE: 19
PIF_VALUE: 0
PIF_VALUE: 18
PIF_VALUE: 16
PIF_VALUE: 18
PIF_VALUE: 15
PIF_VALUE: 19
PIF_VALUE: 21
PIF_VALUE: 1
PIF_VALUE: 19

## 2019-05-17 ASSESSMENT — PAIN SCALES - GENERAL
PAINLEVEL_OUTOF10: 4
PAINLEVEL_OUTOF10: 5
PAINLEVEL_OUTOF10: 3
PAINLEVEL_OUTOF10: 10
PAINLEVEL_OUTOF10: 10
PAINLEVEL_OUTOF10: 7
PAINLEVEL_OUTOF10: 10
PAINLEVEL_OUTOF10: 6
PAINLEVEL_OUTOF10: 6
PAINLEVEL_OUTOF10: 2
PAINLEVEL_OUTOF10: 5
PAINLEVEL_OUTOF10: 0
PAINLEVEL_OUTOF10: 10

## 2019-05-17 ASSESSMENT — PAIN DESCRIPTION - PAIN TYPE
TYPE: CHRONIC PAIN
TYPE: SURGICAL PAIN
TYPE: SURGICAL PAIN

## 2019-05-17 ASSESSMENT — PAIN DESCRIPTION - ORIENTATION
ORIENTATION: RIGHT
ORIENTATION: RIGHT

## 2019-05-17 ASSESSMENT — PAIN DESCRIPTION - LOCATION
LOCATION: HIP;GENERALIZED
LOCATION: HIP
LOCATION: HIP

## 2019-05-17 ASSESSMENT — PAIN DESCRIPTION - FREQUENCY: FREQUENCY: CONTINUOUS

## 2019-05-17 NOTE — PROGRESS NOTES
SW spoke to pt and  regarding discharge. Couple only want ARU. SW spoke to Thomas after reading therapy notes. Pt just got back from surgery this afternoon and recommendation is for SNF. It was determined that we will assess pt's progress again and  hopefully pt will be able to get approved for ARU.

## 2019-05-17 NOTE — CONSULTS
[address]    Consult Note. Date:   5/17/2019  Patient name:  Michelle Jaramillo  Date of admission:  5/16/2019 12:12 AM  MRN:   [id number]  YOB: 1935    Pt seen at the request of Lux Lee MD    CHIEF COMPLAINT:     History Obtained From:  Patient and chart review. HISTORY OF PRESENT ILLNESS:       5/17/19      Patient examined and chart reviewed . Nursing input obtained . Case management PT , OT input noted  [x]       LSW following for discharge to ARU.     Had right hip hemiarthroplasty today.               -Status post right hip arthroplasty today-           Over nite [x] yes     [] NO   ssues   with         ar pain control                  Pain control good                          Active Problems:    Hypothyroidism    Esophageal reflux    Rheumatoid arthritis (HCC)    Benign essential HTN    Cushing syndrome (HCC)    Closed fracture of right hip (HCC)    Closed fracture of neck of right femur with routine healing  Resolved Problems:    * No resolved hospital problems. *             Interval  Status:                Symtoms   ;  [] persist , []  somewhat better ,   [x]  improved,  []  resolved ,   [] worse ---      As compared to yesterday ;    Clinical status ; clinical course ailmentcourse ;                                   are improvingover time.            Other inputs noted - in brief ;   Patient: Michelle Jaramillo  YOB: 1935  MRN: [id number]  Date of Procedure: 5/17/2019     Pre-Op Diagnosis: FRACTURE RIGHT HIP femoral neck     Post-Op Diagnosis: Same       Procedure(s):  HIP HEMIARTHROPLASTY right                 The patient is a 80 y.o.  female who is admitted to the hospital for fall    has rheumatoid arthritis    Right hip fracture       Past Medical History:   has a past medical history of Allergic rhinitis, Anemia, Anxiety, Asthma, Cataract, COPD (chronic obstructive pulmonary disease) ([facility] [address] CTS (carpal tunnel syndrome), Esophageal reflux, Headache(784.0), Hyperlipidemia, Hypertension, Hypothyroidism, Osteoarthritis, Osteoporosis, and Rheumatoid arthritis(714.0). Past Surgical History:   has a past surgical history that includes Rotator cuff repair (Left); Tonsillectomy and adenoidectomy; Hysterectomy, vaginal; Foot surgery; Hemorrhoid surgery; Dilation and curettage of uterus; Cataract removal with implant (Bilateral); Inguinal hernia repair; Cystocele repair; joint replacement (Left); other surgical history; Hammer toe surgery; other surgical history; and HEMIARTHROPLASTY HIP (Right, 5/17/2019). Home Medications:    Prior to Admission medications    Medication Sig Start Date End Date Taking?  Authorizing Provider   furosemide (LASIX) 20 MG tablet Take 1 tablet by mouth daily 3/25/19  Yes Juan Pandey MD   acetaminophen (TYLENOL) 325 MG tablet Take 650 mg by mouth every 6 hours as needed for Pain   Yes Historical Provider, MD   hydrochlorothiazide (HYDRODIURIL) 25 MG tablet TAKE 1 TABLET BY MOUTH EVERY DAY 1/8/19  Yes Juan Pandey MD   olmesartan (BENICAR) 40 MG tablet Take 1 tablet by mouth daily 12/7/18  Yes Juan Pandey MD   levothyroxine (SYNTHROID) 50 MCG tablet Take 1 tablet by mouth daily 12/7/18  Yes Juan Pandey MD   aspirin 81 MG tablet Take 81 mg by mouth daily   Yes Historical Provider, MD   fluticasone (FLONASE) 50 MCG/ACT nasal spray  2/18/18  Yes Historical Provider, MD   neomycin-polymyxin-dexameth [id number] [address] [place] [address]  2/1/18  Yes Historical Provider, MD   oxyCODONE-acetaminophen (PERCOCET)  MG per tablet take 1 tablet by mouth every 6 hours if needed 12/14/17  Yes Historical Provider, MD   vitamin D (ERGOCALCIFEROL) 66138 UNITS CAPS capsule  3/2/17  Yes Historical Provider, MD   clonazePAM (KLONOPIN) 0.5 MG tablet Take 1 tablet by mouth nightly as needed for Anxiety 12/7/16  Yes Juan Pandey MD   predniSONE (DELTASONE) 5 MG SOLN Infuse 5 mg intravenously once. Yearly   Yes Historical Provider, MD   ascorbic acid (VITAMIN C) 500 MG tablet Take 500 mg by mouth daily. Yes Historical Provider, MD   vitamin B-12 (CYANOCOBALAMIN) 500 MCG tablet Take 500 mcg by mouth daily. Yes Historical Provider, MD   b complex vitamins capsule Take 1 capsule by mouth daily. Yes Historical Provider, MD   Biotin 1000 MCG TABS Take  by mouth daily. Yes Historical Provider, MD   formoterol (PERFOROMIST) 20 MCG/2ML nebulizer solution Take 20 mcg by nebulization as needed. Yes Historical Provider, MD       Allergies:  No known allergies    Social History:   reports that she has never smoked. She has never used smokeless tobacco. She reports that she does not drink alcohol or use drugs. Family History: family history includes Cancer in her mother and paternal grandmother; Heart Disease in her father, maternal grandfather, maternal grandmother, mother, paternal grandfather, and paternal grandmother; Hypertension in her mother; Stroke in her maternal grandmother. REVIEW OF SYSTEMS:    · Constitutional: Negative for weight loss  · Eyes: Negative for visual changes, diplopia, scleral icterus. · ENT: Negative for Headaches, hearing loss, vertigo, mouth sores, sore throat. · Cardiovascular: Negative for lightheadedness/orthostatic symptoms ,chest pain, dyspnea on exertion, palpitations or loss of consciousness. · Respiratory: Negative for cough or wheezing, sputum production, hemoptysis, pleuritic pain. · Gastrointestinal: Negative for nausea/vomiting, change in bowel habits, abdominal pain, dysphagia/appetite loss, hematemesis, blood in stools. · Genitourinary:Negative for change in bladder habits, dysuria, trouble voiding, hematuria. · Musculoskeletal:  Rt hip pain   · Integumentary: Negative for rash, pruritis.   · Neurological: Negative for headache, dizziness, change in muscle strength, numbness/tingling, change in gait, balance, coordination,   · Endocrine: negative for temperature intolerance, excessive thirst, fluid intake, or urination, tremor. · Hematologic/Lymphatic: negative for abnormal bruising or bleeding, blood clots, swollen lymph nodes. · Allergic/Immunologic: negative for nasal congestion, pruritis, hives. PHYSICAL EXAM:    /75   Pulse 96   Temp 97.5 °F (36.4 °C) (Oral)   Resp 16   Ht 4' 11\" (1.499 m)   Wt 132 lb 4.4 oz (60 kg)   SpO2 96%   BMI 26.72 kg/m²      · General appearance: well nourished  · HEENT: Head: Normocephalic, no lesions, without obvious abnormality. · Lungs: clear to auscultation bilaterally  · Heart: regular rate and rhythm, S1, S2 normal, no murmur, click, rub or gallop  · Abdomen: soft, non-tender; bowel sounds normal; no masses,  no organomegaly  · Extremities:rt hip mtender Neurological: Gait normal. Reflexes normal and symmetric. Sensation grossly normal  · Skin - no rash, no lump   · Eye no icterus no redness  · Lymphatic system-no lymphadenopathy no splenomegaly       DIAGNOSTICS:    Laboratory Testing:  CBC:   Recent Labs     05/16/19  1537   WBC 7.7   HGB 11.4*        BMP:    Recent Labs     05/16/19  0126 05/16/19  1537    134*   K 3.7 3.4*   CL 99 98   CO2 20 23   BUN 18 9   CREATININE 0.53 <0.40*   GLUCOSE 148* 129*     S. Calcium:  Recent Labs     05/16/19  1537   CALCIUM 8.5*     S. Ionized Calcium:No results for input(s): IONCA in the last 72 hours. S. Magnesium:No results for input(s): MG in the last 72 hours. S. Phosphorus:No results for input(s): PHOS in the last 72 hours. S. Glucose:No results for input(s): POCGLU in the last 72 hours. Glycosylated hemoglobin A1C: No results for input(s): LABA1C in the last 72 hours.   INR:   Recent Labs     05/16/19  0126   INR 1.1     Hepatic functions:   Recent Labs     05/16/19 0126   ALKPHOS 99   ALT 15   AST 24   PROT 7.2   BILITOT 0.27*   LABALBU 4.0     Pancreatic functions:No results for input(s): LACTA, AMYLASE in the last 72 hours. S. Lactic Acid: No results for input(s): LACTA in the last 72 hours. Cardiac enzymes:No results for input(s): CKTOTAL, CKMB, CKMBINDEX, TROPONINI in the last 72 hours. BNP:No results for input(s): BNP in the last 72 hours. Lipid profile: No results for input(s): CHOL, TRIG, HDL, LDLCALC in the last 72 hours. Invalid input(s): LDL  Blood Gases: No results found for: PH, PCO2, PO2, HCO3, O2SAT  Thyroid functions:   Lab Results   Component Value Date    TSH 3.84 06/18/2018        Imaging/Diagonstics:    Ct Head Wo Contrast    Result Date: 5/16/2019  EXAMINATION: CT OF THE HEAD WITHOUT CONTRAST  5/16/2019 1:49 am TECHNIQUE: CT of the head was performed without the administration of intravenous contrast. Dose modulation, iterative reconstruction, and/or weight based adjustment of the mA/kV was utilized to reduce the radiation dose to as low as reasonably achievable. COMPARISON: 09/23/2013 HISTORY: ORDERING SYSTEM PROVIDED HISTORY: trauma TECHNOLOGIST PROVIDED HISTORY: Ordering Physician Provided Reason for Exam: Fall, patient states she hit her head on the left side Acuity: Acute Type of Exam: Initial FINDINGS: BRAIN/VENTRICLES: There is no acute intracranial hemorrhage, mass effect or midline shift. No abnormal extra-axial fluid collection. There are areas of hypoattenuation in the periventricular and subcortical white matter, which is nonspecific, but may represent chronic microvasvular ischemic change. The gray-white differentiation is maintained without evidence of an acute infarct. There is no evidence of hydrocephalus. ORBITS: The visualized portion of the orbits demonstrate no acute abnormality. SINUSES: The visualized paranasal sinuses and mastoid air cells demonstrate no acute abnormality. SOFT TISSUES/SKULL:  No acute abnormality of the visualized skull or soft tissues. No acute intracranial abnormality.      Xr Hip 2-3 Vw W Pelvis Right    Result Date: 5/16/2019  EXAMINATION: ONE XRAY VIEW OF THE PELVIS AND TWO XRAY VIEWS RIGHT HIP 5/16/2019 2:02 am COMPARISON: 10/09/2015 HISTORY: ORDERING SYSTEM PROVIDED HISTORY: trauma TECHNOLOGIST PROVIDED HISTORY: trauma Ordering Physician Provided Reason for Exam: Pt c/o right hip pain s/p fall 2 hours ago. Acuity: Acute Type of Exam: Initial Mechanism of Injury: Pt c/o right hip pain s/p fall 2 hours ago. FINDINGS: Subcapital right proximal femur fracture with about 2 cm of proximal migration of distal fracture fragment. Pelvis appears intact. Status post left total hip replacement. Subcapital right femur fracture as described. ASSESSMENT:    Patient Active Problem List   Diagnosis    Hypothyroidism    Osteoporosis    Esophageal reflux    Cataract    Hyperlipidemia    Osteoarthritis    Anemia    Allergic rhinitis    Asthma    Rheumatoid arthritis ([address]    Methotrexate lung    Benign essential HTN    Cushing syndrome ([facility] [address]    Closed fracture of right hip (HCC)    Closed fracture of neck of right femur with routine healing      non syncopal fall   rt hip fx     asthna     baseline     steroid dependent        bp elevated      From pain   will follow      No diarrhea    Muscle spasms     ekg  nst   PLAN:  Iv lopressor      Cleared for  Surgery     moderate risk   post op aerosols and incentive spirometry    Po ativan for muscle spasms ans anxiety   pain control     Hydrocortisone 50 iv q8   cont ppi    duonebs     5/17/19    · She  had surgery today    Postop stable and progressing well . Pain control satisfactory . Appetite ok . 12. Bowels ok . In view of the history of Cushing's disease patient is receiving hydrocortisone IV  Medications: Allergies:     Allergies   Allergen Reactions    No Known Allergies      Other reaction(s): Unknown       Current Meds:   Scheduled Meds:    docusate sodium  100 mg Oral BID    ceFAZolin (ANCEF) IVPB  2 g Intravenous Q8H    [START ON 5/18/2019] rivaroxaban  10 mg Oral Daily    sodium chloride flush  10 mL Intravenous 2 times per day    metoprolol  5 mg Intravenous Q6H    levothyroxine  50 mcg Oral Daily    losartan  25 mg Oral Daily    pantoprazole  40 mg Oral QAM AC    hydrocortisone sodium succinate PF  50 mg Intravenous Q8H    ipratropium-albuterol  1 ampule Inhalation Q4H WA    lidocaine  1 patch Transdermal Daily     Continuous Infusions:   PRN Meds: promethazine (PHENERGAN) in sodium chloride 0.9% IVPB, ondansetron, HYDROcodone 5 mg - acetaminophen **OR** HYDROcodone 5 mg - acetaminophen, sodium chloride flush, acetaminophen, hydrALAZINE, LORazepam, potassium chloride **OR** potassium alternative oral replacement **OR** potassium chloride          MD KEVIN Durant[address], [address] [place] [address].    Phone [phone]   Fax: [phone]  Answering Service: [phone]

## 2019-05-17 NOTE — BRIEF OP NOTE
Brief Postoperative Note  ______________________________________________________________    Patient: Marla Soares  YOB: 1935  MRN: [id number]  Date of Procedure: 5/17/2019    Pre-Op Diagnosis: FRACTURE RIGHT HIP femoral neck    Post-Op Diagnosis: Same       Procedure(s):  HIP HEMIARTHROPLASTY right    Anesthesia: General    Surgeon(s):  Deanne Cuevas MD    Assistant: emily    Estimated Blood Loss (mL): 575     Complications: None    Specimens:   * No specimens in log *    Implants:  Implant Name Type Inv.  Item Serial No.  Lot No. LRB No. Used   IMPL HIP FEM PRESS FIT ECHO ST 9 X 130 Hip IMPL HIP FEM PRESS FIT ECHO ST 9 X 130  [employer] [id number] Right 1   IMPL HIP FEM HEAD INSRT TAPR ENDO 6.0MM Hip IMPL HIP FEM HEAD INSRT TAPR ENDO 6.0MM  [employer] [id number] Right 1   IMPL HIP FEM HEAD ACET ENDO II 43MM Hip IMPL HIP FEM HEAD ACET ENDO II 43MM  [employer] [id number] Right 1         Drains: * No LDAs found *    Findings: see dictation    Deanne Cuevas MD  Date: 5/17/2019  Time: 9:09 AM

## 2019-05-17 NOTE — CARE COORDINATION
[facility] Orth[facility] Bundle Transition Interview    Patient Interviewed: yes  Informed patient of [facility] Ortho Bundle Program and role of CTS/CTC. CMS Letter Given:  yes    Living Situation:   Living arrangements: with family:  significant other                   Home: 1-story house/ trailer   Social support:a good social support network    Risk Analysis:  Has the following: RARS 21   Chronic Illness: Hyperlipidemia, HTN, Osteoporosis, COPD, Hypothyroidism, Rheumatoid arthritis, Osteoarthritis and Asthma    Fall Risk & DME:   Any previous falls or injuries in the home? Yes   Visual Impairment:  Glasses   Difficulty hearing: wnl    Able to express needs/desires? Yes   Home Equipment: shower chair    Financial Assessment   Afford medications? Yes   Connected with the following services? none    Mode of transportation? car    Health Literacy Assessment   Does anyone help with medications at home? No  Anything preventing from taking medications at home?   No      Anticipated Needs Post Evaluation:  Patient would like to go to ARU when discharged//JU  Follow up appointments:    Future Appointments   Date Time Provider Onel Bolton   5/30/2019  2:40 PM Augustin Hodge MD [facility]

## 2019-05-17 NOTE — DISCHARGE INSTR - COC
Continuity of Care Form    Patient Name: Mary Cabral   :  1935  MRN:  [id number]    Admit date:  2019  Discharge date:  19    Code Status Order: Full Code   Advance Directives:   [facility] Documentation     Date/Time Healthcare Directive Type of Healthcare Directive Copy in [address] Agent's Name Healthcare Agent's Phone Number    [phone]  Yes, patient has an advance directive for healthcare treatment  Living will;Durable power of  for health care  No, copy requested from family  Spouse  Marv Pickett  [phone]          Admitting Physician:  Eva Wallace MD  PCP: Blake Watkins MD    Discharging Nurse: [facility] Unit/Room#: 4251/1457-03  Discharging Unit Phone Number: ***    Emergency Contact:   Extended Emergency Contact Information  Primary Emergency Contact: Prosper Mcmillan  Address: [facility] [address] [facility], [address] Phone: [phone]  Relation: Spouse  Secondary Emergency Contact: Carlos Mcmillan  Address: [address] Phone: [phone]  Relation: Child    Past Surgical History:  Past Surgical History:   Procedure Laterality Date    CATARACT REMOVAL WITH IMPLANT Bilateral     CYSTOCELE REPAIR      DILATION AND CURETTAGE OF UTERUS      four times    FOOT SURGERY      twice due to infection    HAMMER TOE SURGERY      right foot    HEMORRHOID SURGERY      HYSTERECTOMY, VAGINAL      INGUINAL HERNIA REPAIR      x2    JOINT REPLACEMENT Left     left hip    OTHER SURGICAL HISTORY      joint replacement right big toe    OTHER SURGICAL HISTORY      tumor removal from toe - pt not sure which toe    ROTATOR CUFF REPAIR Left     also frozen shoulder    TONSILLECTOMY AND ADENOIDECTOMY         Immunization History:   Immunization History   Administered Date(s) Administered    Influenza Vaccine, unspecified formulation 2014    Influenza, High Dose (Fluzone 65 yrs and older) 09/07/2016, 10/27/2017    Influenza, Triv, inactivated, subunit, adjuvanted, IM (Fluad 65 yrs and older) 11/20/2018    Pneumococcal 13-valent Conjugate ([id number]) 08/18/2015    Pneumococcal Polysaccharide ([id number]) 09/07/2016    Tdap (Boostrix, Adacel) 03/09/2018       Active Problems:  Patient Active Problem List   Diagnosis Code    Hypothyroidism E03.9    Osteoporosis M81.0    Esophageal reflux K21.9    Cataract H26.9    Hyperlipidemia E78.5    Osteoarthritis M19.90    Anemia D64.9    Allergic rhinitis J30.9    Asthma J45.909    Rheumatoid arthritis ([facility]) M06.9    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, T45.1X5A    Benign essential HTN I10    Cushing syndrome ([facility]) E24.9    Closed fracture of right hip ([facility]) S72.001A    Closed fracture of neck of right femur with routine healing S72.001D       Isolation/Infection:   Isolation          C Diff Contact            Nurse Assessment:  Last Vital Signs: /75   Pulse 96   Temp 97.5 °F (36.4 °C) (Oral)   Resp 16   Ht 4' 11\" (1.499 m)   Wt 132 lb 4.4 oz (60 kg)   SpO2 96%   BMI 26.72 kg/m²     Last documented pain score (0-10 scale): Pain Level: 5  Last Weight:   Wt Readings from Last 1 Encounters:   05/16/19 132 lb 4.4 oz (60 kg)     Mental Status:  alert    IV Access:  - None    Nursing Mobility/ADLs:  Walking   {CHP DME QMLQ:[id number]}  Transfer  Assisted  Bathing  Assisted  Dressing  Assisted  Toileting  Assisted  Feeding  Independent  Med Admin  Independent  Med Delivery   whole    Wound Care Documentation and Therapy:        Elimination:  Continence:   · Bowel: Yes  · Bladder: Yes  Urinary Catheter: None   Colostomy/Ileostomy/Ileal Conduit: No       Date of Last BM:     Intake/Output Summary (Last 24 hours) at 5/17/2019 1139  Last data filed at 5/17/2019 0959  Gross per 24 hour   Intake 1050 ml   Output 200 ml   Net 850 ml     No intake/output data recorded. Safety Concerns:      At Risk for Falls    Impairments/Disabilities:      Vision    Nutrition Therapy:  Current Nutrition Therapy:   - Oral Diet:  General    Routes of Feeding: Oral  Liquids: No Restrictions  Daily Fluid Restriction: no  Last Modified Barium Swallow with Video (Video Swallowing Test): not done    Treatments at the Time of Hospital Discharge:   Respiratory Treatments: ***  Oxygen Therapy:  is not on home oxygen therapy. Ventilator:    - No ventilator support    Rehab Therapies: Physical Therapy and Occupational Therapy  Weight Bearing Status/Restrictions: No weight bearing restirctions  Other Medical Equipment (for information only, NOT a DME order):  walker  Other Treatments: ***    Patient's personal belongings (please select all that are sent with patient):  {[facility] DME Belongings:[id number]}    RN SIGNATURE:  Electronically signed by Britni Levy RN on 5/20/19 at 1:42 PM    CASE MANAGEMENT/SOCIAL WORK SECTION    Inpatient Status Date: ***    Readmission Risk Assessment Score:  Readmission Risk              Risk of Unplanned Readmission:        21           Discharging to Facility/ Agency   · Acute Rehab    / signature: Electronically signed by Estuardo Edge RN on 5/17/19 at 11:40 AM    PHYSICIAN SECTION    Prognosis: Good    Condition at Discharge: Stable    Rehab Potential (if transferring to Rehab): Good    Recommended Labs or Other Treatments After Discharge: na    Physician Certification: I certify the above information and transfer of Santiago Wesley  is necessary for the continuing treatment of the diagnosis listed and that she requires [facility] for less 30 days.      Update Admission H&P: No change in H&P    PHYSICIAN SIGNATURE:  Electronically signed by Augustin Hodge MD on 5/20/19 at 9:41 AM

## 2019-05-17 NOTE — CARE COORDINATION
DISCHARGE PLANNING NOTE:    LSW following for discharge to ARU. Had right hip hemiarthroplasty today. PT/OT to see and give rec's. Will continue to follow for additional d/c needs.     Electronically signed by Alta Menezes RN on 5/17/2019 at 11:37 AM

## 2019-05-17 NOTE — PROGRESS NOTES
Patient awoke a little confused. Patient's O2 saturation 86% on room air. Patient placed on nasal cannula oxygen 2LPM.  Patient's O2 saturation now 95% on Ernestina Richter via [employer] to update him on change in patient's condition and to get order for oxygen.

## 2019-05-17 NOTE — PLAN OF CARE
Problem: Pain:  Goal: Control of acute pain  Description  Control of acute pain  Outcome: Met This Shift  Patient's pain level decreased with prescribed pain medications. Problem: Falls - Risk of:  Goal: Will remain free from falls  Description  Will remain free from falls  Outcome: Met This Shift   Pt. Free of falls and injuries this shift.

## 2019-05-17 NOTE — PROGRESS NOTES
[address]    Occupational Therapy Evaluation  Date: 19  Patient Name: Ana Martinez       Room: 5040/8868-91  MRN: [id number]  Account: [de-identified]   : 1935  (80 y.o.) Gender: female     Discharge Recommendations:  [address]       Referring Practitioner: Dr. Marisol Nova  Diagnosis: S/P R hip hemiarthroplasty 19       Treatment Diagnosis: Impaired self-care status. Past Medical History:  has a past medical history of Allergic rhinitis, Anemia, Anxiety, Asthma, Cataract, COPD (chronic obstructive pulmonary disease) ([address] CTS (carpal tunnel syndrome), Esophageal reflux, Headache(784.0), Hyperlipidemia, Hypertension, Hypothyroidism, Osteoarthritis, Osteoporosis, and Rheumatoid arthritis(714.0). Past Surgical History:   has a past surgical history that includes Rotator cuff repair (Left); Tonsillectomy and adenoidectomy; Hysterectomy, vaginal; Foot surgery; Hemorrhoid surgery; Dilation and curettage of uterus; Cataract removal with implant (Bilateral); Inguinal hernia repair; Cystocele repair; joint replacement (Left); other surgical history; Hammer toe surgery; other surgical history; and HEMIARTHROPLASTY HIP (Right, 2019). Restrictions  Restrictions/Precautions: Fall Risk, Weight Bearing(RLE WBAT; RLE posterolateral approach)  Implants present? : (R hip hemiarthroplasty 19; L MARYANN)  Other position/activity restrictions: Posterolateral approach - no hip flexion greater than 90 degrees, no hip adduction across midline, no hip hyperextension, and no external rotation.   Right Lower Extremity Weight Bearing: Weight Bearing As Tolerated  Required Braces or Orthoses?: No     Vitals  Temp: 97.7 °F (36.5 °C)  Pulse: 100  Resp: 20  BP: (!) 147/74  Height: 4' 11\" (149.9 cm)  Weight: 132 lb 4.4 oz (60 kg)  BMI (Calculated): 26.8  Oxygen Therapy  SpO2: 95 %  Pulse Oximeter Device Mode: Intermittent  Pulse Oximeter Device Location: Finger  O2 Device: Nasal cannula  O2 Flow Rate (L/min): 2 L/min  Level of Consciousness: Alert    Subjective  Subjective: \"I would not use a walker. I do not trust a walker\" Pt with poor insight regarding current limitations. Vision  Vision: Impaired  Vision Exceptions: Wears glasses at all times  Hearing  Hearing: Exceptions to [facility]  Hearing Exceptions: Hard of hearing/hearing concerns, No hearing aid  Social/Functional History  Lives With: Spouse  Type of Home: House  Home Layout: One level, Laundry in basement  Home Access: Stairs to enter without rails  Entrance Stairs - Number of Steps: 1 step to enter family room from outside; 11-12 steps to basement with B HR  Bathroom Shower/Tub: Walk-in shower, Tub/Shower unit, Shower chair with back(typically uses tub/shower)  Bathroom Toilet: Handicap height  Bathroom Equipment: Grab bars around toilet, Shower chair, Hand-held shower  Home Equipment: Rolling walker, Cane, Quad cane  ADL Assistance: Independent  Homemaking Assistance: Needs assistance(Pt does laundry and shares cooking/cleaning w/ spouse)  Homemaking Responsibilities: Yes  Ambulation Assistance: Independent(w/ SPC depending on weather/how she is feeling)  Transfer Assistance: Independent  Active : No  Patient's  Info: Pt's spouse  Mode of Transportation: Car  Additional Comments: Pt reports that her spouse and son can provide A PRN upon d/c. Pain Assessment  Pain Assessment: 0-10  Pain Level: 6  Pain Type: Chronic pain  Pain Location: Hip, Generalized(from RA)    Objective  Vision - Basic Assessment  Prior Vision: Wears glasses all the time  Visual History: Cataracts, Corrective eye surgery  Patient Visual Report: No visual complaint reported. Cognition  Overall Cognitive Status: Impaired  Attention Span: Difficulty attending to directions  Memory: Decreased short term memory, Decreased recall of recent events  Following Commands:  Follows one step commands with repetition  Safety Judgement: Decreased awareness of need for safety  Awareness of Errors: Decreased awareness of errors  Insights: Decreased awareness of deficits  Sequencing and Organization: Assistance required to implement solutions, Assistance required to generate solutions, Assistance required to identify errors made   Perception  Overall Perceptual Status: WFL  Sensation  Overall Sensation Status: WFL(denies)   ADL  Feeding: Setup, Increased time to complete(pt reports current RA flare up)  Grooming: Stand by assistance, Setup, Increased time to complete  UE Bathing: Moderate assistance, Setup, Increased time to complete  LE Bathing: Dependent/Total  UE Dressing: Moderate assistance, Setup, Increased time to complete  LE Dressing: Dependent/Total  Toileting: Dependent/Total  Additional Comments: Pt with high pain this date with any movement. Therapist provided education regarding importance of moving and engaging in activity with Poor understanding by Pt. Pt with poor pain tolerance.     UE Function           LUE Strength  Gross LUE Strength: Exceptions to [facility]  L Hand Grasp: 3+/5  LUE Strength Comment: Shoulder 3+/5, elbow 4-/5     LUE Tone: Normotonic     LUE AROM (degrees)  LUE AROM : WFL     Left Hand AROM (degrees)  Left Hand AROM: WFL  RUE Strength  Gross RUE Strength: Exceptions to [facility]  R Hand Grasp: 3+/5  RUE Strength Comment: Shoulder 3+/5, elbow 4-/5      RUE Tone: Normotonic     RUE AROM (degrees)  RUE AROM : WFL     Right Hand AROM (degrees)  Right Hand AROM: WFL    Fine Motor Skills  Coordination  Movements Are Fluid And Coordinated: No  Coordination and Movement description: Left UE, Right UE, Fine motor impairments(Pt reports current RA flare up)                           Mobility  Supine to Sit: 2 Person assistance, Dependent/Total  Sit to Supine: 2 Person assistance, Dependent/Total       Balance  Sitting Balance: Stand by assistance  Standing Balance: (Attempted to stand w/ Max A x 2 - unable to fully stand)        Bed mobility  Rolling to Left: Unable to assess(unable to tolerate- pt resists)  Rolling to Right: Unable to assess(unable to tolerate- pt resists)  Supine to Sit: 2 Person assistance;Dependent/Total  Sit to Supine: 2 Person assistance;Dependent/Total  Comment: pt sat bedside x 10min     Transfers  Transfer Comments: Attempted sit to stand transfer with Max A x 2. Pt unable to fully stand this date due to poor pain tolerance. Pt stood in squat position for 10 seconds. Functional Activity Tolerance  Functional Activity Tolerance: Tolerates 10 - 20 min exercise with multiple rests  Additional Comments: Poor functional activity tolerance d/t pain   Assessment  Performance deficits / Impairments: Decreased functional mobility , Decreased ADL status, Decreased ROM, Decreased strength, Decreased safe awareness, Decreased cognition, Decreased endurance, Decreased balance, Decreased high-level IADLs, Decreased fine motor control  Treatment Diagnosis: Impaired self-care status. Prognosis: Good  Decision Making: Medium Complexity  History: moderate chart review  Exam: 10 performance deficits  Assistance / Modification: significant assist  Patient Education: OT POC, WBAT, importance of therapy, safety with RW  REQUIRES OT FOLLOW UP: Yes  Discharge Recommendations: Subacute/Skilled Nursing Facility  Activity Tolerance: Patient limited by pain         Functional Outcome Measures  AM-PAC Daily Activity Inpatient   How much help for putting on and taking off regular lower body clothing?: Total  How much help for Bathing?: A Lot  How much help for Toileting?: Total  How much help for putting on and taking off regular upper body clothing?: A Lot  How much help for taking care of personal grooming?: A Little       Goals  Patient Goals   Patient goals :  To go to rehab prior to d/c home  Short term goals  Time Frame for Short term goals: By discharge  Short term goal 1: Pt will V/D Good understanding of AE/DME/modified techniques for increased IND with self-care and mobility. Short term goal 2: Pt will actively participate in 10+ minutes of self-care to the best of Pt's ability to promote increased IND with self-care. Short term goal 3: Pt will perform bed mobility with Max A x 1 to sit EOB for increased participation with self-care. Short term goal 4: Pt will perform sit to stand transfer with Max A x 2 to RW with Good safety awareness. Short term goal 5: Pt will actively participate in 10-15 minutes in therapeutic exercise/functional activities to promote increased IND with self-care and mobility.     Plan  Safety Devices  Safety Devices in place: Yes  Type of devices: Call light within reach, Gait belt, Left in bed, Nurse notified, Patient at risk for falls     Plan  Times per week: 5  Times per day: Daily  Current Treatment Recommendations: Self-Care / ADL, Balance Training, Functional Mobility Training, Endurance Training, Pain Management, Safety Education & Training, Patient/Caregiver Education & Training, Equipment Evaluation, Education, & procurement, Positioning    Equipment Recommendations  Equipment Needed: (TBD)  OT Individual Minutes  Time In: 1500  Time Out: 4326  Minutes: 48    Electronically signed by Radha Castellano OT on 5/17/19 at 4:10 PM

## 2019-05-17 NOTE — PROGRESS NOTES
Order received from Dr. Sujata Amezquita via telephone with readback for initiate oxygen therapy protocol. Patient Alert and oriented X4. Patient remembers feeling confused, but states she does not feel confused anymore.

## 2019-05-17 NOTE — PROGRESS NOTES
Physical Therapy    Facility/Department: [facility]  Initial Assessment    NAME: Ramez Larry  : 1935  MRN: [id number]    Date of Service: 2019    Discharge Recommendations:  Subacute/Skilled Nursing Facility   PT Equipment Recommendations  Equipment Needed: No    Assessment   Body structures, Functions, Activity limitations: Decreased functional mobility ; Increased Pain  Assessment: Impaired mobility due to L Hemiarthroplasty secondary to Hip Fx  History: RA  Exam: decreased mobility; Increased pain  Clinical Presentation: evolving  REQUIRES PT FOLLOW UP: Yes  Activity Tolerance  Activity Tolerance: Patient limited by pain       Patient Diagnosis(es): The encounter diagnosis was Closed fracture of right hip, initial encounter ([facility] [address] has a past medical history of Allergic rhinitis, Anemia, Anxiety, Asthma, Cataract, COPD (chronic obstructive pulmonary disease) ([facility] [address] CTS (carpal tunnel syndrome), Esophageal reflux, Headache(784.0), Hyperlipidemia, Hypertension, Hypothyroidism, Osteoarthritis, Osteoporosis, and Rheumatoid arthritis(714.0). has a past surgical history that includes Rotator cuff repair (Left); Tonsillectomy and adenoidectomy; Hysterectomy, vaginal; Foot surgery; Hemorrhoid surgery; Dilation and curettage of uterus; Cataract removal with implant (Bilateral); Inguinal hernia repair; Cystocele repair; joint replacement (Left); other surgical history; Hammer toe surgery; other surgical history; and HEMIARTHROPLASTY HIP (Right, 2019).     Restrictions  Restrictions/Precautions  Restrictions/Precautions: Fall Risk, Weight Bearing(RLE WBAT; RLE posterolateral approach)  Required Braces or Orthoses?: No  Implants present? : (R hip hemiarthroplasty 19; L MARYANN)  Lower Extremity Weight Bearing Restrictions  Right Lower Extremity Weight Bearing: Weight Bearing As Tolerated  Position Activity Restriction  Other position/activity restrictions: Posterolateral approach - no hip flexion greater than 90 degrees, no hip adduction across midline, no hip hyperextension, and no external rotation. Vision/Hearing  Vision: Impaired  Vision Exceptions: Wears glasses at all times  Hearing: Exceptions to [facility]  Hearing Exceptions: Hard of hearing/hearing concerns; No hearing aid     Subjective  General  Patient assessed for rehabilitation services?: Yes  Family / Caregiver Present: Yes  Follows Commands: Within Functional Limits  Subjective  Subjective: pt very focussed on her pain and \"not ready yet\" for any mobility(pt complaining that her Rheumatoid is very bad right now\")  Pain Screening  Patient Currently in Pain: Yes  Pain Assessment  Pain Assessment: 0-10  Pain Level: 6  Pain Type: Surgical pain  Pain Location: Hip  Pain Orientation: Right  Vital Signs  Patient Currently in Pain: Yes       Orientation  Orientation  Overall Orientation Status: Within Functional Limits  Social/Functional History  Social/Functional History  Lives With: Spouse  Type of Home: House  Home Layout: One level, Laundry in basement  Home Access: Stairs to enter without rails  Entrance Stairs - Number of Steps: 1 step to enter family room from outside; 11-12 steps to basement with B HR  Bathroom Shower/Tub: Walk-in shower, Tub/Shower unit, Shower chair with back(typically uses tub/shower)  Bathroom Toilet: Handicap height  Bathroom Equipment: Grab bars around toilet, Shower chair, Hand-held shower  Home Equipment: Rolling walker, Cane, Quad cane  ADL Assistance: Independent  Homemaking Assistance: Needs assistance(Pt does laundry and shares cooking/cleaning w/ spouse)  Homemaking Responsibilities: Yes  Ambulation Assistance: Independent(w/ SPC depending on weather/how she is feeling)  Transfer Assistance: Independent  Active : No  Patient's  Info: Pt's spouse  Mode of Transportation: Car  Additional Comments: Pt reports that her spouse and son can provide A PRN upon d/c.       Objective       PROM RLE (degrees)  RLE General PROM: knee flexion to 90(assisted)  AROM RLE (degrees)  RLE General AROM: pt would not perform AROM to LE  PROM LLE (degrees)  LLE General PROM: knee flexion to 90(assisted)  AROM LLE (degrees)  LLE General AROM: pt would not perform AROM to LE  Strength Other  Other: unable to assess due to pt noncompliance        Bed mobility  Rolling to Left: Unable to assess(unable to tolerate- pt resists)  Rolling to Right: Unable to assess(pt unable to tolera0 pt resists)  Supine to Sit: 2 Person assistance;Dependent/Total  Sit to Supine: Dependent/Total;2 Person assistance  Comment: pt sat bedside x 10min  Transfers  Sit to Stand: 2 Person Assistance;Maximum Assistance  Bed to Chair: 2 Person Assistance;Maximum assistance  Comment: attempted to stand- pt unable to straighten either leg- pt would not use UEs on walker and was pulling walker off the ground; buttocks only 1-2 inches off bed  Ambulation  Ambulation?: No     Balance  Sitting - Static: Good        Plan   Plan  Times per week: BID  Times per day: Twice a day  Current Treatment Recommendations: Functional Mobility Training, Transfer Training, Gait Training, Safety Education & Training  Safety Devices  Type of devices: Left in bed, Patient at risk for falls, Call light within reach      Goals  Short term goals  Time Frame for Short term goals: 2-3 days  Short term goal 1: bed mobility with modA of 2  Short term goal 2: transfers with modA of 2  Short term goal 3: standing with RW/WBAT and Diego of 2 x 2-3min  Patient Goals   Patient goals : less pain       Therapy Time   Individual Concurrent Group Co-treatment   Time In 1500         Time Out 1546         Minutes FLORENCIO Pierson 112, PT

## 2019-05-18 LAB
HCT VFR BLD CALC: 30.3 % (ref 36–46)
HEMOGLOBIN: 9.9 G/DL (ref 12–16)
MCH RBC QN AUTO: 27.1 PG (ref 26–34)
MCHC RBC AUTO-ENTMCNC: 32.5 G/DL (ref 31–37)
MCV RBC AUTO: 83.1 FL (ref 80–100)
NRBC AUTOMATED: ABNORMAL PER 100 WBC
PDW BLD-RTO: 16.7 % (ref 11.5–14.9)
PLATELET # BLD: 251 K/UL (ref 150–450)
PMV BLD AUTO: 7.3 FL (ref 6–12)
RBC # BLD: 3.64 M/UL (ref 4–5.2)
WBC # BLD: 10.2 K/UL (ref 3.5–11)

## 2019-05-18 PROCEDURE — 97535 SELF CARE MNGMENT TRAINING: CPT

## 2019-05-18 PROCEDURE — 99232 SBSQ HOSP IP/OBS MODERATE 35: CPT | Performed by: INTERNAL MEDICINE

## 2019-05-18 PROCEDURE — 97530 THERAPEUTIC ACTIVITIES: CPT

## 2019-05-18 PROCEDURE — 2580000003 HC RX 258: Performed by: ORTHOPAEDIC SURGERY

## 2019-05-18 PROCEDURE — 99024 POSTOP FOLLOW-UP VISIT: CPT | Performed by: ORTHOPAEDIC SURGERY

## 2019-05-18 PROCEDURE — 85027 COMPLETE CBC AUTOMATED: CPT

## 2019-05-18 PROCEDURE — 6370000000 HC RX 637 (ALT 250 FOR IP): Performed by: ORTHOPAEDIC SURGERY

## 2019-05-18 PROCEDURE — 6360000002 HC RX W HCPCS: Performed by: ORTHOPAEDIC SURGERY

## 2019-05-18 PROCEDURE — 6370000000 HC RX 637 (ALT 250 FOR IP): Performed by: INTERNAL MEDICINE

## 2019-05-18 PROCEDURE — 94640 AIRWAY INHALATION TREATMENT: CPT

## 2019-05-18 PROCEDURE — 97116 GAIT TRAINING THERAPY: CPT

## 2019-05-18 PROCEDURE — 2500000003 HC RX 250 WO HCPCS: Performed by: ORTHOPAEDIC SURGERY

## 2019-05-18 PROCEDURE — 1200000000 HC SEMI PRIVATE

## 2019-05-18 PROCEDURE — 36415 COLL VENOUS BLD VENIPUNCTURE: CPT

## 2019-05-18 PROCEDURE — 97110 THERAPEUTIC EXERCISES: CPT

## 2019-05-18 RX ORDER — OXYCODONE HYDROCHLORIDE AND ACETAMINOPHEN 5; 325 MG/1; MG/1
1 TABLET ORAL EVERY 4 HOURS PRN
Status: DISCONTINUED | OUTPATIENT
Start: 2019-05-18 | End: 2019-05-21 | Stop reason: HOSPADM

## 2019-05-18 RX ORDER — PREDNISONE 10 MG/1
10 TABLET ORAL DAILY
Status: DISCONTINUED | OUTPATIENT
Start: 2019-05-18 | End: 2019-05-21 | Stop reason: HOSPADM

## 2019-05-18 RX ADMIN — DOCUSATE SODIUM 100 MG: 100 CAPSULE, LIQUID FILLED ORAL at 22:05

## 2019-05-18 RX ADMIN — HYDROCORTISONE SODIUM SUCCINATE 50 MG: 100 INJECTION, POWDER, FOR SOLUTION INTRAMUSCULAR; INTRAVENOUS at 05:24

## 2019-05-18 RX ADMIN — METOPROLOL TARTRATE 5 MG: 1 INJECTION, SOLUTION INTRAVENOUS at 05:24

## 2019-05-18 RX ADMIN — Medication 10 ML: at 22:06

## 2019-05-18 RX ADMIN — RIVAROXABAN 10 MG: 10 TABLET, FILM COATED ORAL at 18:18

## 2019-05-18 RX ADMIN — Medication 10 ML: at 08:23

## 2019-05-18 RX ADMIN — HYDROCODONE BITARTRATE AND ACETAMINOPHEN 2 TABLET: 5; 325 TABLET ORAL at 05:24

## 2019-05-18 RX ADMIN — IPRATROPIUM BROMIDE AND ALBUTEROL SULFATE 1 AMPULE: .5; 3 SOLUTION RESPIRATORY (INHALATION) at 11:09

## 2019-05-18 RX ADMIN — PREDNISONE 10 MG: 10 TABLET ORAL at 14:53

## 2019-05-18 RX ADMIN — IPRATROPIUM BROMIDE AND ALBUTEROL SULFATE 1 AMPULE: .5; 3 SOLUTION RESPIRATORY (INHALATION) at 14:42

## 2019-05-18 RX ADMIN — IPRATROPIUM BROMIDE AND ALBUTEROL SULFATE 1 AMPULE: .5; 3 SOLUTION RESPIRATORY (INHALATION) at 07:24

## 2019-05-18 RX ADMIN — LEVOTHYROXINE SODIUM 50 MCG: 50 TABLET ORAL at 05:24

## 2019-05-18 RX ADMIN — DOCUSATE SODIUM 100 MG: 100 CAPSULE, LIQUID FILLED ORAL at 08:22

## 2019-05-18 RX ADMIN — OXYCODONE HYDROCHLORIDE AND ACETAMINOPHEN 1 TABLET: 5; 325 TABLET ORAL at 19:23

## 2019-05-18 RX ADMIN — METOPROLOL TARTRATE 5 MG: 1 INJECTION, SOLUTION INTRAVENOUS at 10:17

## 2019-05-18 RX ADMIN — PANTOPRAZOLE SODIUM 40 MG: 40 TABLET, DELAYED RELEASE ORAL at 05:24

## 2019-05-18 RX ADMIN — METOPROLOL TARTRATE 5 MG: 1 INJECTION, SOLUTION INTRAVENOUS at 17:15

## 2019-05-18 RX ADMIN — LOSARTAN POTASSIUM 25 MG: 25 TABLET ORAL at 08:22

## 2019-05-18 RX ADMIN — OXYCODONE HYDROCHLORIDE AND ACETAMINOPHEN 1 TABLET: 5; 325 TABLET ORAL at 23:55

## 2019-05-18 RX ADMIN — Medication 2 G: at 00:48

## 2019-05-18 RX ADMIN — HYDROCODONE BITARTRATE AND ACETAMINOPHEN 2 TABLET: 5; 325 TABLET ORAL at 00:48

## 2019-05-18 RX ADMIN — OXYCODONE HYDROCHLORIDE AND ACETAMINOPHEN 1 TABLET: 5; 325 TABLET ORAL at 14:53

## 2019-05-18 RX ADMIN — HYDROCODONE BITARTRATE AND ACETAMINOPHEN 2 TABLET: 5; 325 TABLET ORAL at 10:17

## 2019-05-18 RX ADMIN — METOPROLOL TARTRATE 5 MG: 1 INJECTION, SOLUTION INTRAVENOUS at 22:06

## 2019-05-18 ASSESSMENT — PAIN DESCRIPTION - ORIENTATION
ORIENTATION: RIGHT

## 2019-05-18 ASSESSMENT — PAIN SCALES - GENERAL
PAINLEVEL_OUTOF10: 6
PAINLEVEL_OUTOF10: 8
PAINLEVEL_OUTOF10: 7
PAINLEVEL_OUTOF10: 6
PAINLEVEL_OUTOF10: 7
PAINLEVEL_OUTOF10: 7
PAINLEVEL_OUTOF10: 6
PAINLEVEL_OUTOF10: 8
PAINLEVEL_OUTOF10: 7

## 2019-05-18 ASSESSMENT — PAIN DESCRIPTION - PAIN TYPE
TYPE: SURGICAL PAIN

## 2019-05-18 ASSESSMENT — PAIN DESCRIPTION - LOCATION
LOCATION: HIP

## 2019-05-18 ASSESSMENT — PAIN DESCRIPTION - FREQUENCY: FREQUENCY: CONTINUOUS

## 2019-05-18 NOTE — ANESTHESIA POST-OP
Doing well postop day 1. She denied any N/V but stated that she had some sore throat after surgery. Pain was under control per Ortho. Anesthesia signed off.

## 2019-05-18 NOTE — PROGRESS NOTES
bed, Nurse notified, Patient at risk for falls      Goals  Short term goals  Time Frame for Short term goals: By discharge  Short term goal 1: Pt will V/D Good understanding of AE/DME/modified techniques for increased IND with self-care and mobility. Short term goal 2: Pt will actively participate in 10+ minutes of self-care to the best of Pt's ability to promote increased IND with self-care. Short term goal 3: Pt will perform bed mobility with Max A x 1 to sit EOB for increased participation with self-care. Short term goal 4: Pt will perform sit to stand transfer with Max A x 2 to RW with Good safety awareness. Short term goal 5: Pt will actively participate in 10-15 minutes in therapeutic exercise/functional activities to promote increased IND with self-care and mobility.     OT Co-Treatment Minutes  Time In: 5211  Time Out: [address]  Minutes: 62      Electronically signed by Collette Haymaker, OT on 5/18/19 at 4:14 PM

## 2019-05-18 NOTE — PROGRESS NOTES
Physical Therapy  Facility/Department: [facility] MED SURG  Daily Treatment Note  NAME: Neno Seay  : 1935  MRN: [id number]    Date of Service: 2019    Discharge Recommendations:  [address]        Patient Diagnosis(es): The encounter diagnosis was Closed fracture of right hip, initial encounter ([facility] [address] has a past medical history of Allergic rhinitis, Anemia, Anxiety, Asthma, Cataract, COPD (chronic obstructive pulmonary disease) ([address] CTS (carpal tunnel syndrome), Esophageal reflux, Headache(784.0), Hyperlipidemia, Hypertension, Hypothyroidism, Osteoarthritis, Osteoporosis, and Rheumatoid arthritis(714.0). has a past surgical history that includes Rotator cuff repair (Left); Tonsillectomy and adenoidectomy; Hysterectomy, vaginal; Foot surgery; Hemorrhoid surgery; Dilation and curettage of uterus; Cataract removal with implant (Bilateral); Inguinal hernia repair; Cystocele repair; joint replacement (Left); other surgical history; Hammer toe surgery; other surgical history; and HEMIARTHROPLASTY HIP (Right, 2019). Restrictions  Restrictions/Precautions  Restrictions/Precautions: Fall Risk, Weight Bearing(RLE WBAT; RLE posterolateral approach)  Required Braces or Orthoses?: No  Implants present? : (R hip hemiarthroplasty 19; L MARYANN)  Lower Extremity Weight Bearing Restrictions  Right Lower Extremity Weight Bearing: Weight Bearing As Tolerated  Position Activity Restriction  Other position/activity restrictions: Posterolateral approach - no hip flexion greater than 90 degrees, no hip adduction across midline, no hip hyperextension, and no external rotation.   Subjective   General  Family / Caregiver Present: Yes  Subjective  Subjective: pt very focussed on her pain and \"not ready yet\" for any mobility(pt complaining that her Rheumatoid is very bad right now\")  General Comment  Comments: co - treat shirley OT patient wanted to get up to use bedside commode(switched out bedside for height functionality patient fearfu)  Pain Screening  Patient Currently in Pain: Yes  Pain Assessment  Pain Assessment: 0-10  Pain Level: 8  Pain Type: Surgical pain  Pain Location: Hip  Pain Orientation: Right  Pain Frequency: Continuous  Non-Pharmaceutical Pain Intervention(s): Rest(she uses med patches/biofreeze/ takes percocets manages home)  Vital Signs  Patient Currently in Pain: Yes  Oxygen Therapy  O2 Device: None (Room air)       Orientation     Cognition      Objective   Bed mobility  Bridging: Stand by assistance  Scooting: Minimal assistance(x2 partial standing )  Transfers  Sit to Stand: 2 Person Assistance;Maximum Assistance  Stand to sit: 2 Person Assistance;Minimal Assistance  Bed to Chair: 2 Person Assistance;Maximum assistance(coomode bedside min assist to max back)  Stand Pivot Transfers: Moderate Assistance  Squat Pivot Transfers: 2 Person Assistance(max back fearful fatigued)  Ambulation  Ambulation?: Yes  Ambulation 1  Surface: level tile  Device: Rolling QHANSQ(8'56 v/cs for handplacement and safety techs)  Assistance: 2 Person assistance; Moderate assistance  Quality of Gait: poor would not advance right required tactile cues to advance left heavy verbal  Distance: 1- 2 ft  Comments: lots of encouragement and refocusing required to stay on task  Stairs/Curb  Stairs?: No(not ready)     Balance  Posture: Fair  Sitting - Static: Good;-  Sitting - Dynamic: Fair  Standing - Static: Fair(with walker flexed at hips fearful)  Standing - Dynamic: Poor;+  Exercises  Heelslides: x 5  Knee Active Range of Motion: x 5 each  Ankle Pumps: to tolerance   PROM RLE (degrees)  RLE General PROM: knee flexion to 90(assisted)  AROM RLE (degrees)  RLE General AROM: today perform knee flexion supine slides  PROM LLE (degrees)  LLE General PROM: knee flexion to 90(assisted)     Comment: right  LE on pillor(between legs during transfers)              Assessment   Body structures, Functions, Activity limitations: Decreased functional mobility ; Increased Pain  Assessment: Impaired mobility due to L Hemiarthroplasty secondary to Hip Fx  Patient Education: training RW fearful to use   Barriers to Learning: mutiple pain sites due to chronic pain and arthriitis  REQUIRES PT FOLLOW UP: Yes  Activity Tolerance  Activity Tolerance: Patient limited by pain; Patient limited by fatigue;Patient Tolerated treatment well  Other Comments  Comments: she wants to go slow and tenses up easily keep her informed and on task     G-Code     OutComes Score                                                  AM-PAC Score             Goals  Short term goals  Time Frame for Short term goals: 2-3 days  Short term goal 1: bed mobility with modA of 2  Short term goal 2: transfers with modA of 2  Short term goal 3: standing with RW/WBAT and Diego of 2 x 2-3min  Patient Goals   Patient goals : less pain    Plan    Plan  Times per week: BID  Times per day: Twice a day  Current Treatment Recommendations: Functional Mobility Training, Transfer Training, Gait Training, Safety Education & Training  Safety Devices  Type of devices: Left in bed, Patient at risk for falls, Call light within reach     Therapy Time   Individual Concurrent Group Co-treatment   Time In (P) 1445         Time Out (P) 1545         Minutes (P) 60                 Bettye White, PTA

## 2019-05-18 NOTE — PLAN OF CARE
Problem: Pain:  Goal: Control of acute pain  Description  Control of acute pain  5/18/2019 1740 by William Recinos RN  Outcome: Ongoing  Pt. Pain at 7, PRN pain medication given when appropriate will continue to monitor. Problem: Falls - Risk of:  Goal: Will remain free from falls  Description  Will remain free from falls  5/18/2019 1740 by William Recinos RN  Outcome: Ongoing  Pt. Remains free of falls, Will continue to monitor.

## 2019-05-18 NOTE — PROGRESS NOTES
[address] [facility].    Date:   5/18/2019  Patient name:  Jakob Lopez  Date of admission:  5/16/2019 12:12 AM  MRN:   [id number]  YOB: 1935    CC- post op     HPI-  Pt post op left hemiarthroplasty doing well   Pain still there     REVIEW OF SYSTEMS:    · General----negative for fatigue, weight loss  · GI negative for nausea and vomiting, no dysphagia       EXAM-  /70   Pulse 89   Temp 97.5 °F (36.4 °C) (Oral)   Resp 16   Ht 4' 11\" (1.499 m)   Wt 132 lb 4.4 oz (60 kg)   SpO2 93%   BMI 26.72 kg/m²      · General appearance: NAD conversant  · Lungs: normal effort, clear to auscultation bilaterally,no wheeze. · Heart: regular rate and rhythm, S1, S2 normal, no murmur  · Abdomen: soft, non-tender; no masses, no organomegaly  · Extremities: no cyanosis, no edema no clubbing no synovitis      Laboratory Testing:  CBC:   Recent Labs     05/18/19  0643   WBC 10.2   HGB 9.9*        BMP:    Recent Labs     05/16/19  0126 05/16/19  1537    134*   K 3.7 3.4*   CL 99 98   CO2 20 23   BUN 18 9   CREATININE 0.53 <0.40*   GLUCOSE 148* 129*         ASSESSMENT:    Patient Active Problem List   Diagnosis    Hypothyroidism    Osteoporosis    Esophageal reflux    Cataract    Hyperlipidemia    Osteoarthritis    Anemia    Allergic rhinitis    Asthma    Rheumatoid arthritis (HCC)    Methotrexate lung    Benign essential HTN    Cushing syndrome ([facility] [address]    Closed fracture of right hip (HCC)    Closed fracture of neck of right femur with routine healing       PLAN:  Post op doing well   bp stable change to po prednisone   Change to percocet for pain control     MD KEVIN [facility]  [address] [facility], [address] [place] [address].    Phone [phone]   Fax: [phone]  Answering Service: [phone]

## 2019-05-18 NOTE — PROGRESS NOTES
Rounding for Dr. Prado Aid this Weekend. No events O/N. Pain appropriately controlled. Denies any CP, SOB, N/V.    BP (!) 145/75   Pulse 99   Temp 97.7 °F (36.5 °C) (Axillary)   Resp 16   Ht 4' 11\" (1.499 m)   Wt 132 lb 4.4 oz (60 kg)   SpO2 93%   BMI 26.72 kg/m²   Right hip dressing is clean, dry and intact. Sensation is grossly intact to light touch diffusely. 1+ pedal pulses. +tim DF/PF of toes. A/P: 81 yo sp right hip HA POD 1  - Pain control  - DVT ppx  - Mobilize with PT.  WBAT RLE

## 2019-05-18 NOTE — OP NOTE
[address]                 [address] [facility], [address]                                OPERATIVE REPORT    PATIENT NAME: Indira Castellanos                     :        1935  MED REC NO:   [id number]                              ROOM:       2048  ACCOUNT NO:   [de-identified]                           ADMIT DATE: 2019  PROVIDER:     Joette Severs    DATE OF PROCEDURE:  2019    PREOPERATIVE DIAGNOSIS:  Right femoral neck fracture, displaced. POSTOPERATIVE DIAGNOSIS:  Right femoral neck fracture, displaced. OPERATING SURGEON:  Joette Severs, MD    ANESTHESIA:  General.    PROCEDURE PERFORMED:  Right hip hemiarthroplasty Press-Fit Echo Stem. FINDINGS:  Cross-table AP intraop, acceptable component positioning. PROCEDURE IN DETAIL:  The patient was taken to operating room, placed  under general anesthesia, positioned in lateral decubitus position. Right lower extremity was prepped and draped in the usual sterile  fashion. Posterior lateral approach to the hip was made with incision  through skin and subcutaneous fat, tensor fascia zion and T-capsulotomy  was made just below the piriformis tendon. Femoral neck cut was completed. The femoral head was removed and  measured. Trial femoral head was trialed and found to be acceptable. Attention directed to femur. Lateral cookie cutter, canal finder  sequentially broached  until the appropriate sized stem. The patient  required a -6 neck on the trial with a very stable hip. Trial was  dislocated, the hip was pulse lavaged. Final component was impacted  into position. Hip was reduced, found to be extensively stable at  extension, external rotation, flexion to 45 degrees, abduction 20  degrees, internal rotation 90 stable. Cross-table AP x-ray was obtained. His wound was soaked in Betadine. Acceptable component positioning. Wound was pulse lavaged with Betadine  solution.   T-capsulotomy was repaired with #2 Vicryl suture, tensor  fascia zion with #2 Vicryl sutures, subcuticular layer with 2-0 Vicryl  followed by skin staples and Aquacel dressing. The patient awakened  from anesthesia, taken to recovery room in stable condition. COMPLICATIONS RAISED DURING OPERATION:  None noted.         GERALDINE Barker    D: 05/17/2019 9:25:19       T: 05/17/2019 9:35:20     [id number]  Job#: [id number]     Doc#: [id number]    CC:

## 2019-05-18 NOTE — PLAN OF CARE
Problem: Pain:  Goal: Pain level will decrease  Description  Pain level will decrease  Outcome: Ongoing  Note:   Pt medicated as ordered by Orthapedic Dr. Elenore Mcburney to surgical limb  Pt postioned for comfort  Pt encouraged to exercise and participate in PT/OT  Pt states adequate pain control and PRN meds as charted      Problem: Falls - Risk of:  Goal: Will remain free from falls  Description  Will remain free from falls  5/17/2019 2011 by Elaina Tian RN  Outcome: Ongoing  Note:   Pt remains free of fall.   Fall risk interventions continued  Pt has a weak gait  Pt uses assitive devices   Fall risk sign at door as well as on arm band   5/17/2019 0622 by Dm Doss RN  Outcome: Met This Shift

## 2019-05-19 LAB
HCT VFR BLD CALC: 29.2 % (ref 36–46)
HEMOGLOBIN: 9.5 G/DL (ref 12–16)
MCH RBC QN AUTO: 27.4 PG (ref 26–34)
MCHC RBC AUTO-ENTMCNC: 32.7 G/DL (ref 31–37)
MCV RBC AUTO: 83.9 FL (ref 80–100)
NRBC AUTOMATED: ABNORMAL PER 100 WBC
PDW BLD-RTO: 16.9 % (ref 11.5–14.9)
PLATELET # BLD: 238 K/UL (ref 150–450)
PMV BLD AUTO: 7 FL (ref 6–12)
RBC # BLD: 3.48 M/UL (ref 4–5.2)
WBC # BLD: 8.1 K/UL (ref 3.5–11)

## 2019-05-19 PROCEDURE — 36415 COLL VENOUS BLD VENIPUNCTURE: CPT

## 2019-05-19 PROCEDURE — 2700000000 HC OXYGEN THERAPY PER DAY

## 2019-05-19 PROCEDURE — 85027 COMPLETE CBC AUTOMATED: CPT

## 2019-05-19 PROCEDURE — 99024 POSTOP FOLLOW-UP VISIT: CPT | Performed by: ORTHOPAEDIC SURGERY

## 2019-05-19 PROCEDURE — 2580000003 HC RX 258: Performed by: ORTHOPAEDIC SURGERY

## 2019-05-19 PROCEDURE — 6370000000 HC RX 637 (ALT 250 FOR IP): Performed by: ORTHOPAEDIC SURGERY

## 2019-05-19 PROCEDURE — 97535 SELF CARE MNGMENT TRAINING: CPT

## 2019-05-19 PROCEDURE — 99232 SBSQ HOSP IP/OBS MODERATE 35: CPT | Performed by: INTERNAL MEDICINE

## 2019-05-19 PROCEDURE — 97110 THERAPEUTIC EXERCISES: CPT

## 2019-05-19 PROCEDURE — 94761 N-INVAS EAR/PLS OXIMETRY MLT: CPT

## 2019-05-19 PROCEDURE — 94640 AIRWAY INHALATION TREATMENT: CPT

## 2019-05-19 PROCEDURE — 97530 THERAPEUTIC ACTIVITIES: CPT

## 2019-05-19 PROCEDURE — 97116 GAIT TRAINING THERAPY: CPT

## 2019-05-19 PROCEDURE — 6370000000 HC RX 637 (ALT 250 FOR IP): Performed by: INTERNAL MEDICINE

## 2019-05-19 PROCEDURE — 1200000000 HC SEMI PRIVATE

## 2019-05-19 PROCEDURE — 2500000003 HC RX 250 WO HCPCS: Performed by: ORTHOPAEDIC SURGERY

## 2019-05-19 RX ORDER — POLYETHYLENE GLYCOL 3350 17 G/17G
17 POWDER, FOR SOLUTION ORAL DAILY
Status: DISCONTINUED | OUTPATIENT
Start: 2019-05-19 | End: 2019-05-21 | Stop reason: HOSPADM

## 2019-05-19 RX ADMIN — PANTOPRAZOLE SODIUM 40 MG: 40 TABLET, DELAYED RELEASE ORAL at 04:15

## 2019-05-19 RX ADMIN — METOPROLOL TARTRATE 5 MG: 1 INJECTION, SOLUTION INTRAVENOUS at 04:08

## 2019-05-19 RX ADMIN — METOPROLOL TARTRATE 5 MG: 1 INJECTION, SOLUTION INTRAVENOUS at 21:12

## 2019-05-19 RX ADMIN — LOSARTAN POTASSIUM 25 MG: 25 TABLET ORAL at 10:13

## 2019-05-19 RX ADMIN — OXYCODONE HYDROCHLORIDE AND ACETAMINOPHEN 1 TABLET: 5; 325 TABLET ORAL at 04:09

## 2019-05-19 RX ADMIN — OXYCODONE HYDROCHLORIDE AND ACETAMINOPHEN 1 TABLET: 5; 325 TABLET ORAL at 08:23

## 2019-05-19 RX ADMIN — DOCUSATE SODIUM 100 MG: 100 CAPSULE, LIQUID FILLED ORAL at 10:14

## 2019-05-19 RX ADMIN — METOPROLOL TARTRATE 5 MG: 1 INJECTION, SOLUTION INTRAVENOUS at 16:12

## 2019-05-19 RX ADMIN — IPRATROPIUM BROMIDE AND ALBUTEROL SULFATE 1 AMPULE: .5; 3 SOLUTION RESPIRATORY (INHALATION) at 20:19

## 2019-05-19 RX ADMIN — Medication 10 ML: at 21:12

## 2019-05-19 RX ADMIN — OXYCODONE HYDROCHLORIDE AND ACETAMINOPHEN 1 TABLET: 5; 325 TABLET ORAL at 22:55

## 2019-05-19 RX ADMIN — Medication 10 ML: at 10:15

## 2019-05-19 RX ADMIN — LEVOTHYROXINE SODIUM 50 MCG: 50 TABLET ORAL at 04:12

## 2019-05-19 RX ADMIN — RIVAROXABAN 10 MG: 10 TABLET, FILM COATED ORAL at 18:21

## 2019-05-19 RX ADMIN — OXYCODONE HYDROCHLORIDE AND ACETAMINOPHEN 1 TABLET: 5; 325 TABLET ORAL at 13:02

## 2019-05-19 RX ADMIN — IPRATROPIUM BROMIDE AND ALBUTEROL SULFATE 1 AMPULE: .5; 3 SOLUTION RESPIRATORY (INHALATION) at 15:29

## 2019-05-19 RX ADMIN — DOCUSATE SODIUM 100 MG: 100 CAPSULE, LIQUID FILLED ORAL at 21:13

## 2019-05-19 RX ADMIN — METOPROLOL TARTRATE 5 MG: 1 INJECTION, SOLUTION INTRAVENOUS at 10:14

## 2019-05-19 RX ADMIN — PREDNISONE 10 MG: 10 TABLET ORAL at 10:13

## 2019-05-19 RX ADMIN — IPRATROPIUM BROMIDE AND ALBUTEROL SULFATE 1 AMPULE: .5; 3 SOLUTION RESPIRATORY (INHALATION) at 11:27

## 2019-05-19 RX ADMIN — OXYCODONE HYDROCHLORIDE AND ACETAMINOPHEN 1 TABLET: 5; 325 TABLET ORAL at 18:21

## 2019-05-19 ASSESSMENT — PAIN SCALES - GENERAL
PAINLEVEL_OUTOF10: 6
PAINLEVEL_OUTOF10: 7
PAINLEVEL_OUTOF10: 6
PAINLEVEL_OUTOF10: 5
PAINLEVEL_OUTOF10: 6
PAINLEVEL_OUTOF10: 6
PAINLEVEL_OUTOF10: 5
PAINLEVEL_OUTOF10: 5
PAINLEVEL_OUTOF10: 6
PAINLEVEL_OUTOF10: 0
PAINLEVEL_OUTOF10: 6
PAINLEVEL_OUTOF10: 5
PAINLEVEL_OUTOF10: 8
PAINLEVEL_OUTOF10: 0

## 2019-05-19 ASSESSMENT — PAIN DESCRIPTION - FREQUENCY
FREQUENCY: CONTINUOUS

## 2019-05-19 ASSESSMENT — PAIN DESCRIPTION - ONSET
ONSET: ON-GOING

## 2019-05-19 ASSESSMENT — PAIN DESCRIPTION - LOCATION
LOCATION: HIP;ABDOMEN
LOCATION: HIP

## 2019-05-19 ASSESSMENT — PAIN DESCRIPTION - DESCRIPTORS
DESCRIPTORS: ACHING

## 2019-05-19 ASSESSMENT — PAIN DESCRIPTION - PAIN TYPE
TYPE: SURGICAL PAIN

## 2019-05-19 ASSESSMENT — PAIN DESCRIPTION - ORIENTATION
ORIENTATION: RIGHT

## 2019-05-19 NOTE — CARE COORDINATION
ONGOING DISCHARGE PLAN:    Plan remains for LSW to continue to follow for Possible DC to ARU. Pt. Is POD #2, Rt. Hip Hemiarthroplasty. PT/OT on board, will follow rec. Will continue to follow for additional discharge needs.     Electronically signed by Steve Miller RN on 5/19/2019 at 12:48 PM

## 2019-05-19 NOTE — PROGRESS NOTES
Rounding for Dr. Enoc Pham this weekend. No events overnight. Right hip sore. Reports constipation. Also complains of her ankle being tender with pressure applied. BP (!) 147/65   Pulse 90   Temp 98.2 °F (36.8 °C) (Oral)   Resp 16   Ht 4' 11\" (1.499 m)   Wt 132 lb 4.4 oz (60 kg)   SpO2 96%   BMI 26.72 kg/m²   Right hip dressing is clean, dry and intact. Sensation is grossly intact to light touch diffusely. 2 pedal pulses. +tim DF/PF of toes and foot. A/P: 81 yo sp right hip hemiarthroplasty POD 2  - pain control. Resume miralax she takes at home to help with constipation  - DVT ppx  - Continue to mobilize with PT.  WBAT RLE  - Dispo: likely to SNF once arrangements are made

## 2019-05-19 NOTE — PROGRESS NOTES
[address] [facility].    Date:   5/19/2019  Patient name:  Harriett Ward  Date of admission:  5/16/2019 12:12 AM  MRN:   [id number]  YOB: 1935    CC- post op     HPI-  Pt post op left hemiarthroplasty doing well   Pain much better with percocet   Hb I sstable 9.5     REVIEW OF SYSTEMS:    · General----negative for fatigue, weight loss  · GI negative for nausea and vomiting, no dysphagia       EXAM-  BP (!) 149/71   Pulse 92   Temp 97.4 °F (36.3 °C) (Oral)   Resp 16   Ht 4' 11\" (1.499 m)   Wt 132 lb 4.4 oz (60 kg)   SpO2 94%   BMI 26.72 kg/m²      · General appearance: NAD conversant  · Lungs: normal effort, clear to auscultation bilaterally,no wheeze. · Heart: regular rate and rhythm, S1, S2 normal, no murmur  · Abdomen: soft, non-tender; no masses, no organomegaly  · Extremities: no cyanosis, no edema no clubbing no synovitis      Laboratory Testing:  CBC:   Recent Labs     05/19/19  0634   WBC 8.1   HGB 9.5*        BMP:    No results for input(s): NA, K, CL, CO2, BUN, CREATININE, GLUCOSE in the last 72 hours. ASSESSMENT:    Patient Active Problem List   Diagnosis    Hypothyroidism    Osteoporosis    Esophageal reflux    Cataract    Hyperlipidemia    Osteoarthritis    Anemia    Allergic rhinitis    Asthma    Rheumatoid arthritis ([address]    Methotrexate lung    Benign essential HTN    Cushing syndrome ([facility] [address]    Closed fracture of right hip (HCC)    Closed fracture of neck of right femur with routine healing       PLAN:  Post op doing well   bp stable change to po prednisone   Change to percocet for pain control   Ok to dc to rehab     MD KEVIN [address], [address] [place] [address].    Phone [phone]   Fax: [phone]  Answering Service: [phone]

## 2019-05-19 NOTE — PROGRESS NOTES
Physical Therapy  Facility/Department: [facility] MED SURG  Daily Treatment Note  NAME: Andie Zapata  : 1935  MRN: [id number]    Date of Service: 2019    Discharge Recommendations:  [address]        Patient Diagnosis(es): The encounter diagnosis was Closed fracture of right hip, initial encounter ([facility] [address] has a past medical history of Allergic rhinitis, Anemia, Anxiety, Asthma, Cataract, COPD (chronic obstructive pulmonary disease) ([address] CTS (carpal tunnel syndrome), Esophageal reflux, Headache(784.0), Hyperlipidemia, Hypertension, Hypothyroidism, Osteoarthritis, Osteoporosis, and Rheumatoid arthritis(714.0). has a past surgical history that includes Rotator cuff repair (Left); Tonsillectomy and adenoidectomy; Hysterectomy, vaginal; Foot surgery; Hemorrhoid surgery; Dilation and curettage of uterus; Cataract removal with implant (Bilateral); Inguinal hernia repair; Cystocele repair; joint replacement (Left); other surgical history; Hammer toe surgery; other surgical history; and HEMIARTHROPLASTY HIP (Right, 2019). Restrictions  Restrictions/Precautions  Restrictions/Precautions: Fall Risk, Weight Bearing  Required Braces or Orthoses?: No  Implants present? : (R hip hemiarthroplasty 19; L MARYANN)  Lower Extremity Weight Bearing Restrictions  Right Lower Extremity Weight Bearing: Weight Bearing As Tolerated  Position Activity Restriction  Other position/activity restrictions: Posterolateral approach - no hip flexion greater than 90 degrees, no hip adduction across midline, no hip hyperextension, and no external rotation. Subjective   General  Family / Caregiver Present: Yes pm  ED  of 60 years  Subjective  Subjective: still has concerns with her pain stating been up 4 x with nursing since last visit  General Comment  Comments: writer came in early patient stated not now come back RN in with her assisting her needs.   Pain Screening  Patient Currently in Pain: Yes  Pain Assessment  Pain Assessment: 0-10  Pain Level: 5  Pain Type: Surgical pain  Pain Location: Hip  Pain Orientation: Right  Pain Descriptors: Aching  Pain Frequency: Continuous  Pain Onset: On-going  Non-Pharmaceutical Pain Intervention(s): Rest  Response to Pain Intervention: Asleep with RR greater than 10  Vital Signs  Patient Currently in Pain: Yes  Oxygen Therapy  O2 Device: None (Room air)       Orientation     Cognition      Objective   Bed mobility  Scooting: Contact guard assistance(at EOB she was able to advance to [facility] takes time)  Transfers  Sit to Stand: Moderate Assistance( rail and RW)  Stand to sit: Moderate Assistance  Bed to Chair: Moderate assistance(BSC)  Stand Pivot Transfers: Moderate Assistance  Comment: takes more time for all transfers and requires redirecting  Ambulation  Ambulation?: Yes  Ambulation 1  Surface: level tile  Device: Rolling Walker  Assistance: Moderate assistance  Quality of Gait: better today short step length slow reece  Distance: 3 ft x 2 (forward and retro does better going backwards)  Comments: slow reece takes more time  Stairs/Curb  Stairs?: No     Balance  Posture: Fair  Sitting - Static: Good  Sitting - Dynamic: Fair  Standing - Static: Fair;+  Standing - Dynamic: Fair;+  Exercises  Quad Sets: x10  Heelslides: x5  Gluteal Sets: x10  Ankle Pumps: x20  Other exercises  Other exercises?: Yes  Other exercises 1: seated LAQ  Other exercises 2: towel slides  Other exercises 3: hip flexion RLE x 5         Comment: patient stood 15 min brushing teeth at tray table grooming (no complaints standing that length of time ambulated after)              Assessment   Body structures, Functions, Activity limitations: Decreased functional mobility ; Increased Pain  Assessment: Impaired mobility due to L Hemiarthroplasty secondary to Hip Fx  REQUIRES PT FOLLOW UP: Yes  Activity Tolerance  Activity Tolerance: Patient limited by pain; Patient limited by fatigue;Patient Tolerated treatment well(tolerated though patient states can not handle and does fine)  Other Comments  Comments: patient performs tasks hard to read her pain tolerances at times.      G-Code     OutComes Score                                                  AM-PAC Score             Goals  Short term goals  Time Frame for Short term goals: 2-3 days  Short term goal 1: bed mobility with modA of 2  Short term goal 2: transfers with modA of 2  Short term goal 3: standing with RW/WBAT and Diego of 2 x 2-3min  Patient Goals   Patient goals : less pain    Plan    Plan  Times per week: BID  Times per day: Twice a day  Current Treatment Recommendations: Functional Mobility Training, Transfer Training, Gait Training, Safety Education & Training  Safety Devices  Type of devices: Left in bed, Patient at risk for falls, Call light within reach     Therapy Time     05/19/19 1037 05/19/19 1600   PT Individual Minutes   Time In 0945 1430   Time Out 9971 1500   Minutes [address] [place], [facility]

## 2019-05-19 NOTE — PROGRESS NOTES
Balance: Minimal assistance(mostly CGA of 1)  Standing Balance  Time: 10-12 min x 2, 5 min x 2  Activity: adls with RW  Comment: pt indep moving BLE for mobility with RW, extra time needed      ADL  Feeding: Setup; Increased time to complete  Grooming: Setup; Increased time to complete;Minimal assistance  UE Bathing: Moderate assistance;Setup; Increased time to complete  LE Bathing: Dependent/Total  UE Dressing: Moderate assistance;Setup; Increased time to complete  LE Dressing: Dependent/Total  Toileting: Dependent/Total  Additional Comments: pt on BSC when OT arrived, pt stood to wipe (10 - 12 min), sat to urinate more, stood to wipe (10 to 12 min), then SPT with RW to sit EOB, stood and attempted to walk to sink , but due to pain, sat back on EOB     Transfers  Stand Step Transfers: Minimal assistance(RW)  Sit to stand: Minimal assistance  Stand to sit: Minimal assistance                             Assessment  Assessment: improved mobility, less agitated today  Activity Tolerance: Patient limited by pain; Patient limited by fatigue  Activity Tolerance:  mobility goals were met for today, pt more confident in her ability to SPT today, not agitated                Patient Education:  Patient Education: hand placement sit to stand and reverse, correct internal rotation RLE in sit and stand  Learner:patient  Method: demonstration and explanation       Outcome: demonstrated understanding and needs reinforcement     Plan  Safety Devices  Type of devices: Call light within reach, Gait belt, Left in bed, Nurse notified, Patient at risk for falls      Goals  Short term goals  Time Frame for Short term goals: By discharge  Short term goal 1: Pt will V/D Good understanding of AE/DME/modified techniques for increased IND with self-care and mobility. Short term goal 2: Pt will actively participate in 10+ minutes of self-care to the best of Pt's ability to promote increased IND with self-care.   Short term goal 3: Pt will perform

## 2019-05-19 NOTE — PLAN OF CARE
Problem: Pain:  Goal: Control of acute pain  Description  Control of acute pain  Outcome: Ongoing  Pt. Verbalizes pain felt better after getting up and moving with therapy. Will continue to monitor. Problem: Falls - Risk of:  Goal: Will remain free from falls  Description  Will remain free from falls  5/19/2019 1632 by William Recinos RN  Outcome: Ongoing  Pt. Remains free of falls. Appropriate fall precautions in place. Will continue to monitor. Problem: Musculor/Skeletal Functional Status  Goal: Highest potential functional level  Outcome: Ongoing  Pt. Tolerates getting up and moving better. Will continue to monitor.

## 2019-05-20 PROCEDURE — 6370000000 HC RX 637 (ALT 250 FOR IP): Performed by: ORTHOPAEDIC SURGERY

## 2019-05-20 PROCEDURE — 99231 SBSQ HOSP IP/OBS SF/LOW 25: CPT | Performed by: INTERNAL MEDICINE

## 2019-05-20 PROCEDURE — 97116 GAIT TRAINING THERAPY: CPT

## 2019-05-20 PROCEDURE — 94640 AIRWAY INHALATION TREATMENT: CPT

## 2019-05-20 PROCEDURE — 2580000003 HC RX 258: Performed by: ORTHOPAEDIC SURGERY

## 2019-05-20 PROCEDURE — 1200000000 HC SEMI PRIVATE

## 2019-05-20 PROCEDURE — 97110 THERAPEUTIC EXERCISES: CPT

## 2019-05-20 PROCEDURE — 94761 N-INVAS EAR/PLS OXIMETRY MLT: CPT

## 2019-05-20 PROCEDURE — 2500000003 HC RX 250 WO HCPCS: Performed by: ORTHOPAEDIC SURGERY

## 2019-05-20 PROCEDURE — 97535 SELF CARE MNGMENT TRAINING: CPT

## 2019-05-20 PROCEDURE — 97530 THERAPEUTIC ACTIVITIES: CPT

## 2019-05-20 PROCEDURE — 99024 POSTOP FOLLOW-UP VISIT: CPT | Performed by: ORTHOPAEDIC SURGERY

## 2019-05-20 PROCEDURE — 6370000000 HC RX 637 (ALT 250 FOR IP): Performed by: INTERNAL MEDICINE

## 2019-05-20 RX ORDER — OXYCODONE AND ACETAMINOPHEN 10; 325 MG/1; MG/1
1 TABLET ORAL EVERY 6 HOURS PRN
Qty: 40 TABLET | Refills: 0 | Status: ON HOLD | OUTPATIENT
Start: 2019-05-20 | End: 2019-06-04 | Stop reason: HOSPADM

## 2019-05-20 RX ADMIN — IPRATROPIUM BROMIDE AND ALBUTEROL SULFATE 1 AMPULE: .5; 3 SOLUTION RESPIRATORY (INHALATION) at 12:38

## 2019-05-20 RX ADMIN — METOPROLOL TARTRATE 5 MG: 1 INJECTION, SOLUTION INTRAVENOUS at 17:09

## 2019-05-20 RX ADMIN — METOPROLOL TARTRATE 5 MG: 1 INJECTION, SOLUTION INTRAVENOUS at 10:57

## 2019-05-20 RX ADMIN — LEVOTHYROXINE SODIUM 50 MCG: 50 TABLET ORAL at 07:56

## 2019-05-20 RX ADMIN — LORAZEPAM 0.5 MG: 0.5 TABLET ORAL at 23:59

## 2019-05-20 RX ADMIN — METOPROLOL TARTRATE 25 MG: 25 TABLET, FILM COATED ORAL at 20:34

## 2019-05-20 RX ADMIN — OXYCODONE HYDROCHLORIDE AND ACETAMINOPHEN 1 TABLET: 5; 325 TABLET ORAL at 12:11

## 2019-05-20 RX ADMIN — DOCUSATE SODIUM 100 MG: 100 CAPSULE, LIQUID FILLED ORAL at 20:27

## 2019-05-20 RX ADMIN — Medication 10 ML: at 10:57

## 2019-05-20 RX ADMIN — DOCUSATE SODIUM 100 MG: 100 CAPSULE, LIQUID FILLED ORAL at 07:56

## 2019-05-20 RX ADMIN — OXYCODONE HYDROCHLORIDE AND ACETAMINOPHEN 1 TABLET: 5; 325 TABLET ORAL at 07:55

## 2019-05-20 RX ADMIN — LEVOTHYROXINE SODIUM 50 MCG: 50 TABLET ORAL at 03:46

## 2019-05-20 RX ADMIN — Medication 10 ML: at 20:27

## 2019-05-20 RX ADMIN — PANTOPRAZOLE SODIUM 40 MG: 40 TABLET, DELAYED RELEASE ORAL at 07:55

## 2019-05-20 RX ADMIN — METOPROLOL TARTRATE 5 MG: 1 INJECTION, SOLUTION INTRAVENOUS at 03:45

## 2019-05-20 RX ADMIN — Medication 10 ML: at 17:10

## 2019-05-20 RX ADMIN — PANTOPRAZOLE SODIUM 40 MG: 40 TABLET, DELAYED RELEASE ORAL at 03:46

## 2019-05-20 RX ADMIN — OXYCODONE HYDROCHLORIDE AND ACETAMINOPHEN 1 TABLET: 5; 325 TABLET ORAL at 03:09

## 2019-05-20 RX ADMIN — PREDNISONE 10 MG: 10 TABLET ORAL at 07:56

## 2019-05-20 RX ADMIN — RIVAROXABAN 10 MG: 10 TABLET, FILM COATED ORAL at 17:40

## 2019-05-20 RX ADMIN — OXYCODONE HYDROCHLORIDE AND ACETAMINOPHEN 1 TABLET: 5; 325 TABLET ORAL at 20:27

## 2019-05-20 RX ADMIN — OXYCODONE HYDROCHLORIDE AND ACETAMINOPHEN 1 TABLET: 5; 325 TABLET ORAL at 16:26

## 2019-05-20 RX ADMIN — LOSARTAN POTASSIUM 25 MG: 25 TABLET ORAL at 07:56

## 2019-05-20 RX ADMIN — IPRATROPIUM BROMIDE AND ALBUTEROL SULFATE 1 AMPULE: .5; 3 SOLUTION RESPIRATORY (INHALATION) at 08:39

## 2019-05-20 RX ADMIN — IPRATROPIUM BROMIDE AND ALBUTEROL SULFATE 1 AMPULE: .5; 3 SOLUTION RESPIRATORY (INHALATION) at 21:18

## 2019-05-20 ASSESSMENT — PAIN DESCRIPTION - ORIENTATION
ORIENTATION: RIGHT

## 2019-05-20 ASSESSMENT — PAIN SCALES - GENERAL
PAINLEVEL_OUTOF10: 2
PAINLEVEL_OUTOF10: 4
PAINLEVEL_OUTOF10: 8
PAINLEVEL_OUTOF10: 7
PAINLEVEL_OUTOF10: 2
PAINLEVEL_OUTOF10: 9
PAINLEVEL_OUTOF10: 6
PAINLEVEL_OUTOF10: 6
PAINLEVEL_OUTOF10: 0

## 2019-05-20 ASSESSMENT — PAIN DESCRIPTION - PAIN TYPE
TYPE: SURGICAL PAIN

## 2019-05-20 ASSESSMENT — PAIN DESCRIPTION - LOCATION
LOCATION: HIP
LOCATION: HIP;COCCYX
LOCATION: HIP
LOCATION: HIP

## 2019-05-20 ASSESSMENT — PAIN DESCRIPTION - DESCRIPTORS: DESCRIPTORS: ACHING

## 2019-05-20 NOTE — PROGRESS NOTES
SW spoke to pt and family about ARU and need for pt to participate in therapy again. Pt is agreeable and wants to try and get approved.

## 2019-05-20 NOTE — PROGRESS NOTES
[facility]   INPATIENT OCCUPATIONAL THERAPY  PROGRESS NOTE  Date: 2019  Patient Name: Rocky Spear      Room: 8087/0345-77  MRN: [id number]    : 1935  (80 y.o.) Gender: female     Discharge Recommendations:  [address]       Referring Practitioner: Dr. Luci Chavarria  Diagnosis: S/P R hip hemiarthroplasty 19  General  Chart Reviewed: Yes  Patient assessed for rehabilitation services?: Yes  Family / Caregiver Present: Yes  Referring Practitioner: Dr. Luci Chavarria  Diagnosis: S/P R hip hemiarthroplasty 19    Restrictions  Restrictions/Precautions: Fall Risk, Weight Bearing  Implants present? : (R hip hemiarthroplasty 19; L MARYANN)  Other position/activity restrictions: Posterolateral approach - no hip flexion greater than 90 degrees, no hip adduction across midline, no hip hyperextension, and no external rotation. Right Lower Extremity Weight Bearing: Weight Bearing As Tolerated  Required Braces or Orthoses?: No      Subjective  Subjective: \"Let me tell you about all of my experiences\" . ... \"I have two of those at home (referring to reacher)\"  Comments: pt very talkative in detail and needs cues to redirect her focus to the task at hand, her thoughts are logical but tangental and continuous impeding focus on therapy tasks. Handouts provided for increased carryover 2* pt demo poor attention to task. Poor return noted during treatment.   Patient Currently in Pain: Yes  Pain Level: 2  Pain Location: Hip  Pain Orientation: Right     Patient Observation  Observations: room air  Pain Assessment  Pain Level: 2  Pain Type: Surgical pain  Pain Location: Hip  Pain Orientation: Right  Pain Descriptors: Aching  Response to Pain Intervention: Patient Satisfied    Objective  Cognition  Overall Cognitive Status: Impaired  Attention Span: Difficulty attending to directions(better today but pt continues to be distracted by the long stories she wants to tell)  Memory: Appears intact  Following Commands: Follows one step commands with repetition  Safety Judgement: Decreased awareness of need for safety  Awareness of Errors: (fair)  Insights: Decreased awareness of deficits  Sequencing and Organization: Assistance required to implement solutions;Assistance required to generate solutions;Assistance required to identify errors made     Balance  Sitting Balance: Supervision  Standing Balance: Contact guard assistance  Standing Balance  Time: pt declined during tx  Comment: per patient and nursing CGA/Chelsea       ADL  Feeding: Setup; Increased time to complete  Grooming: Setup; Increased time to complete;Minimal assistance  UE Bathing: Moderate assistance;Setup; Increased time to complete  LE Bathing: Dependent/Total  UE Dressing: Moderate assistance;Setup; Increased time to complete  LE Dressing: Dependent/Total  Toileting: Dependent/Total  Additional Comments: Per clinical reasoning and patient report. Pt declined fxl participation in hands on training for AE LBD. Pt V familiarity c sock aid. Briefly was able to educate on reacher for pants/sock mgt, pt demo poor attention to task. Educational handouts on fxl use of LBD and EC/WS c ADLs post THR provided for increased follow through. . Writer encouraged pt to review for increased understanding; Dea Monique will f/u to assess carryover of knowledge. Transfers  Sit to stand: Minimal assistance  Stand to sit: Minimal assistance  Transfer Comments: per nursing/pt report  Fine Motor: RA deficits BUE               Vision  Patient Visual Report: No visual complaint reported. Assessment  Performance deficits / Impairments: Decreased functional mobility ; Decreased ADL status; Decreased ROM; Decreased strength;Decreased safe awareness;Decreased cognition;Decreased endurance;Decreased balance;Decreased high-level IADLs;Decreased fine motor control  Assessment: difficlty to redirect to task/education; requires additional training, handouts provided for increased interpretation of education proivded   Prognosis: Good  Discharge Recommendations: Subacute/Skilled Nursing Facility  Activity Tolerance: Patient limited by pain; Patient limited by fatigue  Safety Devices in place: Yes  Type of devices: Call light within reach;Gait belt;Left in bed;Nurse notified; Patient at risk for falls  Equipment Recommendations  Equipment Needed: Yes(TBD)  Reacher: for LBD  Sock Aid: X  Other: Long Handled Sponge          Patient Education:  Patient Education: OT POC, tending to task  Learner:patient  Method: demonstration, explanation and handout       Outcome: needs reinforcement, poor attention to task     Plan  Safety Devices  Safety Devices in place: Yes  Type of devices: Call light within reach, Gait belt, Left in bed, Nurse notified, Patient at risk for falls  Plan  Times per week: 5  Times per day: Daily  Current Treatment Recommendations: Self-Care / ADL, Balance Training, Functional Mobility Training, Endurance Training, Pain Management, Safety Education & Training, Patient/Caregiver Education & Training, Equipment Evaluation, Education, & procurement, Positioning      Goals  Short term goals  Time Frame for Short term goals: By discharge  Short term goal 1: Pt will V/D Good understanding of AE/DME/modified techniques for increased IND with self-care and mobility. Short term goal 2: Pt will actively participate in 10+ minutes of self-care to the best of Pt's ability to promote increased IND with self-care. Short term goal 3: Pt will perform bed mobility with Max A x 1 to sit EOB for increased participation with self-care. Short term goal 4: Pt will perform sit to stand transfer with Max A x 2 to RW with Good safety awareness. Short term goal 5: Pt will actively participate in 10-15 minutes in therapeutic exercise/functional activities to promote increased IND with self-care and mobility.     OT Individual Minutes  Time In: 1073  Time Out: 0840  Minutes:

## 2019-05-20 NOTE — PLAN OF CARE
Problem: HEMODYNAMIC STATUS  Goal: Patient has stable vital signs and fluid balance  Outcome: Ongoing     Problem: Pain:  Goal: Pain level will decrease  Description  Pain level will decrease  Outcome: Ongoing  Note:   Pain decreased with prescribed medication. Problem: Falls - Risk of:  Goal: Will remain free from falls  Description  Will remain free from falls  5/20/2019 0433 by Jc Thibodeaux RN  Outcome: Ongoing  Note:   No falls this shift. Call light within reach and siderails x2. Bed in lowest position. Patient safety maintained. Problem: Falls - Risk of:  Goal: Absence of physical injury  Description  Absence of physical injury  Outcome: Ongoing  Note:   No falls this shift. Call light within reach and siderails x2. Bed in lowest position. Patient safety maintained. Problem: Musculor/Skeletal Functional Status  Goal: Absence of falls  Outcome: Ongoing  Note:   No falls this shift. Call light within reach and siderails x2. Bed in lowest position. Patient safety maintained.

## 2019-05-20 NOTE — PROGRESS NOTES
Physical Therapy  Facility/Department: [facility] MED SURG  Daily Treatment Note  NAME: Palak Simms  : 1935  MRN: [id number]    Date of Service: 2019    Discharge Recommendations:  Subacute/Skilled Nursing Facility, IP Rehab(discussed with PT Fritz Ames )        Patient Diagnosis(es): The encounter diagnosis was Closed fracture of right hip, initial encounter ([facility] [address] has a past medical history of Allergic rhinitis, Anemia, Anxiety, Asthma, Cataract, COPD (chronic obstructive pulmonary disease) ([address] CTS (carpal tunnel syndrome), Esophageal reflux, Headache(784.0), Hyperlipidemia, Hypertension, Hypothyroidism, Osteoarthritis, Osteoporosis, and Rheumatoid arthritis(714.0). has a past surgical history that includes Rotator cuff repair (Left); Tonsillectomy and adenoidectomy; Hysterectomy, vaginal; Foot surgery; Hemorrhoid surgery; Dilation and curettage of uterus; Cataract removal with implant (Bilateral); Inguinal hernia repair; Cystocele repair; joint replacement (Left); other surgical history; Hammer toe surgery; other surgical history; and HEMIARTHROPLASTY HIP (Right, 2019). Restrictions  Restrictions/Precautions  Restrictions/Precautions: Fall Risk, Weight Bearing  Required Braces or Orthoses?: No  Implants present? : (R hip hemiarthroplasty 19; L MARYANN)  Lower Extremity Weight Bearing Restrictions  Right Lower Extremity Weight Bearing: Weight Bearing As Tolerated  Position Activity Restriction  Other position/activity restrictions: Posterolateral approach - no hip flexion greater than 90 degrees, no hip adduction across midline, no hip hyperextension, and no external rotation. Subjective   Subjective  Subjective: Pt is sitting in her chair upon arrival. Pt is concerned about functional mobility as needed to use to rest room versus using the purwick. \"It's taking away from my hip\" referring to using more energy to use the rest room vs completing therapy.  Pt educated on the importance of redirected with education on the importance of ther ex/pre gait activites. Other exercises  Other exercises?: Yes  Other exercises 4: Seated BLE ex's maintain all precautions. AROM. Reps: 10 each.,  Other exercises 5: standing pre gait activities. BUE support on RW. Pt requires CGA for safety. Other exercises 6: static stand x~5-7 min. Other exercises 7: supine BLE ex's maintain all precautions. REps: 10 each. Comment: Max vc's to stay on tasks. Assessment   Body structures, Functions, Activity limitations: Decreased functional mobility ; Increased Pain  Assessment: Impaired mobility due to L Hemiarthroplasty secondary to Hip Fx. Pt would benefit from additional PT to increase strength, endurance, and independence with functional mobility. History: RA  Patient Education: POC, importance of functional mobility/ther ex  Barriers to Learning: multiple pain sites d/t chronic pain/arthritis. REQUIRES PT FOLLOW UP: Yes  Activity Tolerance  Activity Tolerance: Patient limited by pain; Patient limited by fatigue;Patient Tolerated treatment well  Other Comments  Comments: easily distracted- vc's to stay on tasks. Fixates on pain.               Goals  Short term goals  Time Frame for Short term goals: 2-3 days  Short term goal 1: bed mobility with modA of 2  Short term goal 2: transfers with modA of 2  Short term goal 3: standing with RW/WBAT and Diego of 2 x 2-3min  Short term goal 4: ambulate with min/CGA with RW/WBAT x 25ft  Patient Goals   Patient goals : less pain    Plan    Plan  Times per week: BID  Times per day: Twice a day  Current Treatment Recommendations: Functional Mobility Training, Transfer Training, Gait Training, Safety Education & Training  Safety Devices  Type of devices: Left in bed, Patient at risk for falls, Call light within reach     Therapy Time   Individual Concurrent Group Co-treatment   Time In 6336(6330)         Time Out 1724(0822)         Minutes [phone] Saba Mallory, PTA

## 2019-05-20 NOTE — PROGRESS NOTES
Surgical Progress Note    POD: 3    Patient doing well  Vitals:    19 0838   BP:    Pulse:    Resp: 18   Temp:    SpO2: 96%      Temp (24hrs), Av.9 °F (36.6 °C), Min:97.4 °F (36.3 °C), Max:98.2 °F (36.8 °C)       Pain Control Good  No unusual nausea    Exam:  Doing well      Lungs:  No respiratory distress    Labs reviewed:  Hemoglobin   Date/Time Value Ref Range Status   2019 06:34 AM 9.5 (L) 12.0 - 16.0 g/dL Final     Hematocrit   Date/Time Value Ref Range Status   2019 06:34 AM 29.2 (L) 36 - 46 % Final     WBC   Date/Time Value Ref Range Status   2019 06:34 AM 8.1 3.5 - 11.0 k/uL Final     Sodium   Date/Time Value Ref Range Status   2019 03:37  (L) 135 - 144 mmol/L Final     Potassium   Date/Time Value Ref Range Status   2019 03:37 PM 3.4 (L) 3.7 - 5.3 mmol/L Final     Chloride   Date/Time Value Ref Range Status   2019 03:37 PM 98 98 - 107 mmol/L Final     CO2   Date/Time Value Ref Range Status   2019 03:37 PM 23 20 - 31 mmol/L Final     INR   Date/Time Value Ref Range Status   2019 01:26 AM 1.1  Final     Comment:           Non-therapeutic Range:     INR = 0.9-1.2  Therapeutic Range: Moderate Anticoagulant Intensity:     INR = 2.0-3.0   High Anticoagulant Intensity:     INR = 2.5-3.5               I/O last 3 completed shifts:   In: 12 [P.O.:560]  Out:  [Urine:]    Assessment:  S/p padmini    Plan:  See my orders    Heidi Perez MD  2019 9:39 AM

## 2019-05-20 NOTE — CARE COORDINATION
DISCHARGE PLANNING NOTE:    LSW following for possible d/c to ARU, however PT/OT recommending SNF. POD#3 Right Hip Hemiarthroplasty. Will continue to follow for additional d/c needs.       Electronically signed by Harshil Knight RN on 5/20/2019 at 1:27 PM

## 2019-05-21 ENCOUNTER — HOSPITAL ENCOUNTER (INPATIENT)
Age: 84
LOS: 14 days | Discharge: HOME HEALTH | DRG: 560 | End: 2019-06-04
Attending: PHYSICAL MEDICINE & REHABILITATION | Admitting: PHYSICAL MEDICINE & REHABILITATION
Payer: MEDICARE

## 2019-05-21 VITALS
OXYGEN SATURATION: 94 % | HEIGHT: 59 IN | WEIGHT: 132.28 LBS | SYSTOLIC BLOOD PRESSURE: 149 MMHG | BODY MASS INDEX: 26.67 KG/M2 | HEART RATE: 96 BPM | DIASTOLIC BLOOD PRESSURE: 67 MMHG | TEMPERATURE: 98 F | RESPIRATION RATE: 22 BRPM

## 2019-05-21 DIAGNOSIS — M15.9 PRIMARY OSTEOARTHRITIS INVOLVING MULTIPLE JOINTS: Primary | ICD-10-CM

## 2019-05-21 DIAGNOSIS — S72.001D CLOSED FRACTURE OF NECK OF RIGHT FEMUR WITH ROUTINE HEALING: ICD-10-CM

## 2019-05-21 DIAGNOSIS — F41.9 ANXIETY: ICD-10-CM

## 2019-05-21 PROBLEM — Z96.642 HISTORY OF LEFT HIP HEMIARTHROPLASTY: Status: ACTIVE | Noted: 2019-05-21

## 2019-05-21 PROCEDURE — 6370000000 HC RX 637 (ALT 250 FOR IP): Performed by: ORTHOPAEDIC SURGERY

## 2019-05-21 PROCEDURE — 97110 THERAPEUTIC EXERCISES: CPT

## 2019-05-21 PROCEDURE — 97116 GAIT TRAINING THERAPY: CPT

## 2019-05-21 PROCEDURE — 97530 THERAPEUTIC ACTIVITIES: CPT

## 2019-05-21 PROCEDURE — 94761 N-INVAS EAR/PLS OXIMETRY MLT: CPT

## 2019-05-21 PROCEDURE — 99024 POSTOP FOLLOW-UP VISIT: CPT | Performed by: ORTHOPAEDIC SURGERY

## 2019-05-21 PROCEDURE — 94640 AIRWAY INHALATION TREATMENT: CPT

## 2019-05-21 PROCEDURE — 97535 SELF CARE MNGMENT TRAINING: CPT

## 2019-05-21 PROCEDURE — 6370000000 HC RX 637 (ALT 250 FOR IP): Performed by: INTERNAL MEDICINE

## 2019-05-21 PROCEDURE — 1180000000 HC REHAB R&B

## 2019-05-21 PROCEDURE — 2580000003 HC RX 258: Performed by: ORTHOPAEDIC SURGERY

## 2019-05-21 PROCEDURE — 99232 SBSQ HOSP IP/OBS MODERATE 35: CPT | Performed by: INTERNAL MEDICINE

## 2019-05-21 RX ORDER — LORAZEPAM 0.5 MG/1
0.5 TABLET ORAL EVERY 4 HOURS PRN
Status: CANCELLED | OUTPATIENT
Start: 2019-05-21

## 2019-05-21 RX ORDER — PANTOPRAZOLE SODIUM 40 MG/1
40 TABLET, DELAYED RELEASE ORAL
Status: DISCONTINUED | OUTPATIENT
Start: 2019-05-22 | End: 2019-06-04 | Stop reason: HOSPADM

## 2019-05-21 RX ORDER — IPRATROPIUM BROMIDE AND ALBUTEROL SULFATE 2.5; .5 MG/3ML; MG/3ML
1 SOLUTION RESPIRATORY (INHALATION)
Status: DISCONTINUED | OUTPATIENT
Start: 2019-05-21 | End: 2019-06-04 | Stop reason: HOSPADM

## 2019-05-21 RX ORDER — ACETAMINOPHEN 325 MG/1
650 TABLET ORAL EVERY 4 HOURS PRN
Status: CANCELLED | OUTPATIENT
Start: 2019-05-21

## 2019-05-21 RX ORDER — PREDNISONE 10 MG/1
10 TABLET ORAL DAILY
Status: DISCONTINUED | OUTPATIENT
Start: 2019-05-22 | End: 2019-06-04 | Stop reason: HOSPADM

## 2019-05-21 RX ORDER — METOPROLOL TARTRATE 50 MG/1
50 TABLET, FILM COATED ORAL 2 TIMES DAILY
Status: DISCONTINUED | OUTPATIENT
Start: 2019-05-21 | End: 2019-05-21 | Stop reason: HOSPADM

## 2019-05-21 RX ORDER — LOSARTAN POTASSIUM 25 MG/1
25 TABLET ORAL DAILY
Status: CANCELLED | OUTPATIENT
Start: 2019-05-22

## 2019-05-21 RX ORDER — POLYETHYLENE GLYCOL 3350 17 G/17G
17 POWDER, FOR SOLUTION ORAL DAILY
Status: CANCELLED | OUTPATIENT
Start: 2019-05-21

## 2019-05-21 RX ORDER — ACETAMINOPHEN 325 MG/1
650 TABLET ORAL EVERY 4 HOURS PRN
Status: DISCONTINUED | OUTPATIENT
Start: 2019-05-21 | End: 2019-06-04 | Stop reason: HOSPADM

## 2019-05-21 RX ORDER — METOPROLOL TARTRATE 50 MG/1
50 TABLET, FILM COATED ORAL 2 TIMES DAILY
Status: DISCONTINUED | OUTPATIENT
Start: 2019-05-21 | End: 2019-06-04 | Stop reason: HOSPADM

## 2019-05-21 RX ORDER — LOSARTAN POTASSIUM 25 MG/1
25 TABLET ORAL DAILY
Status: DISCONTINUED | OUTPATIENT
Start: 2019-05-22 | End: 2019-06-04 | Stop reason: HOSPADM

## 2019-05-21 RX ORDER — DOCUSATE SODIUM 100 MG/1
100 CAPSULE, LIQUID FILLED ORAL 2 TIMES DAILY
Status: DISCONTINUED | OUTPATIENT
Start: 2019-05-21 | End: 2019-05-22

## 2019-05-21 RX ORDER — IPRATROPIUM BROMIDE AND ALBUTEROL SULFATE 2.5; .5 MG/3ML; MG/3ML
1 SOLUTION RESPIRATORY (INHALATION)
Status: CANCELLED | OUTPATIENT
Start: 2019-05-21

## 2019-05-21 RX ORDER — OXYCODONE HYDROCHLORIDE AND ACETAMINOPHEN 5; 325 MG/1; MG/1
1 TABLET ORAL EVERY 4 HOURS PRN
Status: CANCELLED | OUTPATIENT
Start: 2019-05-21

## 2019-05-21 RX ORDER — ACETAMINOPHEN 325 MG/1
650 TABLET ORAL EVERY 4 HOURS PRN
Status: DISCONTINUED | OUTPATIENT
Start: 2019-05-21 | End: 2019-05-21 | Stop reason: SDUPTHER

## 2019-05-21 RX ORDER — LIDOCAINE 4 G/G
1 PATCH TOPICAL DAILY
Status: CANCELLED | OUTPATIENT
Start: 2019-05-22

## 2019-05-21 RX ORDER — LORAZEPAM 0.5 MG/1
0.5 TABLET ORAL EVERY 4 HOURS PRN
Status: DISCONTINUED | OUTPATIENT
Start: 2019-05-21 | End: 2019-05-29

## 2019-05-21 RX ORDER — LEVOTHYROXINE SODIUM 0.05 MG/1
50 TABLET ORAL DAILY
Status: CANCELLED | OUTPATIENT
Start: 2019-05-22

## 2019-05-21 RX ORDER — LIDOCAINE 4 G/G
1 PATCH TOPICAL DAILY
Status: DISCONTINUED | OUTPATIENT
Start: 2019-05-22 | End: 2019-06-04 | Stop reason: HOSPADM

## 2019-05-21 RX ORDER — PREDNISONE 10 MG/1
10 TABLET ORAL DAILY
Status: CANCELLED | OUTPATIENT
Start: 2019-05-22

## 2019-05-21 RX ORDER — DOCUSATE SODIUM 100 MG/1
100 CAPSULE, LIQUID FILLED ORAL 2 TIMES DAILY
Status: CANCELLED | OUTPATIENT
Start: 2019-05-21

## 2019-05-21 RX ORDER — METOPROLOL TARTRATE 50 MG/1
50 TABLET, FILM COATED ORAL 2 TIMES DAILY
Status: CANCELLED | OUTPATIENT
Start: 2019-05-21

## 2019-05-21 RX ORDER — OXYCODONE HYDROCHLORIDE AND ACETAMINOPHEN 5; 325 MG/1; MG/1
1 TABLET ORAL EVERY 4 HOURS PRN
Status: DISCONTINUED | OUTPATIENT
Start: 2019-05-21 | End: 2019-05-29

## 2019-05-21 RX ORDER — POLYETHYLENE GLYCOL 3350 17 G/17G
17 POWDER, FOR SOLUTION ORAL DAILY
Status: DISCONTINUED | OUTPATIENT
Start: 2019-05-21 | End: 2019-06-04 | Stop reason: HOSPADM

## 2019-05-21 RX ORDER — LEVOTHYROXINE SODIUM 0.05 MG/1
50 TABLET ORAL DAILY
Status: DISCONTINUED | OUTPATIENT
Start: 2019-05-22 | End: 2019-06-04 | Stop reason: HOSPADM

## 2019-05-21 RX ORDER — PANTOPRAZOLE SODIUM 40 MG/1
40 TABLET, DELAYED RELEASE ORAL
Status: CANCELLED | OUTPATIENT
Start: 2019-05-22

## 2019-05-21 RX ADMIN — PREDNISONE 10 MG: 10 TABLET ORAL at 08:24

## 2019-05-21 RX ADMIN — IPRATROPIUM BROMIDE AND ALBUTEROL SULFATE 1 AMPULE: .5; 3 SOLUTION RESPIRATORY (INHALATION) at 16:09

## 2019-05-21 RX ADMIN — Medication 10 ML: at 08:25

## 2019-05-21 RX ADMIN — LEVOTHYROXINE SODIUM 50 MCG: 50 TABLET ORAL at 04:28

## 2019-05-21 RX ADMIN — LOSARTAN POTASSIUM 25 MG: 25 TABLET ORAL at 08:24

## 2019-05-21 RX ADMIN — OXYCODONE HYDROCHLORIDE AND ACETAMINOPHEN 1 TABLET: 5; 325 TABLET ORAL at 12:44

## 2019-05-21 RX ADMIN — METOPROLOL TARTRATE 25 MG: 25 TABLET, FILM COATED ORAL at 08:24

## 2019-05-21 RX ADMIN — METOPROLOL TARTRATE 50 MG: 50 TABLET ORAL at 22:03

## 2019-05-21 RX ADMIN — OXYCODONE HYDROCHLORIDE AND ACETAMINOPHEN 1 TABLET: 5; 325 TABLET ORAL at 22:11

## 2019-05-21 RX ADMIN — PANTOPRAZOLE SODIUM 40 MG: 40 TABLET, DELAYED RELEASE ORAL at 04:28

## 2019-05-21 RX ADMIN — DOCUSATE SODIUM 100 MG: 100 CAPSULE, LIQUID FILLED ORAL at 08:24

## 2019-05-21 RX ADMIN — DOCUSATE SODIUM 100 MG: 100 CAPSULE, LIQUID FILLED ORAL at 22:03

## 2019-05-21 RX ADMIN — POLYETHYLENE GLYCOL 3350 17 G: 17 POWDER, FOR SOLUTION ORAL at 08:39

## 2019-05-21 RX ADMIN — OXYCODONE HYDROCHLORIDE AND ACETAMINOPHEN 1 TABLET: 5; 325 TABLET ORAL at 04:28

## 2019-05-21 RX ADMIN — POLYETHYLENE GLYCOL 3350 17 G: 17 POWDER, FOR SOLUTION ORAL at 22:03

## 2019-05-21 RX ADMIN — IPRATROPIUM BROMIDE AND ALBUTEROL SULFATE 1 AMPULE: .5; 3 SOLUTION RESPIRATORY (INHALATION) at 21:51

## 2019-05-21 RX ADMIN — OXYCODONE HYDROCHLORIDE AND ACETAMINOPHEN 1 TABLET: 5; 325 TABLET ORAL at 08:24

## 2019-05-21 RX ADMIN — OXYCODONE HYDROCHLORIDE AND ACETAMINOPHEN 1 TABLET: 5; 325 TABLET ORAL at 00:25

## 2019-05-21 RX ADMIN — OXYCODONE HYDROCHLORIDE AND ACETAMINOPHEN 1 TABLET: 5; 325 TABLET ORAL at 16:57

## 2019-05-21 RX ADMIN — LORAZEPAM 0.5 MG: 0.5 TABLET ORAL at 22:03

## 2019-05-21 RX ADMIN — IPRATROPIUM BROMIDE AND ALBUTEROL SULFATE 1 AMPULE: .5; 3 SOLUTION RESPIRATORY (INHALATION) at 07:33

## 2019-05-21 RX ADMIN — IPRATROPIUM BROMIDE AND ALBUTEROL SULFATE 1 AMPULE: .5; 3 SOLUTION RESPIRATORY (INHALATION) at 12:27

## 2019-05-21 RX ADMIN — RIVAROXABAN 10 MG: 10 TABLET, FILM COATED ORAL at 16:57

## 2019-05-21 ASSESSMENT — PAIN SCALES - GENERAL
PAINLEVEL_OUTOF10: 0
PAINLEVEL_OUTOF10: 5
PAINLEVEL_OUTOF10: 0
PAINLEVEL_OUTOF10: 0
PAINLEVEL_OUTOF10: 3
PAINLEVEL_OUTOF10: 8
PAINLEVEL_OUTOF10: 8
PAINLEVEL_OUTOF10: 4
PAINLEVEL_OUTOF10: 8
PAINLEVEL_OUTOF10: 8
PAINLEVEL_OUTOF10: 5
PAINLEVEL_OUTOF10: 10
PAINLEVEL_OUTOF10: 7

## 2019-05-21 ASSESSMENT — PAIN DESCRIPTION - LOCATION
LOCATION: HIP
LOCATION: HIP
LOCATION: FOOT

## 2019-05-21 ASSESSMENT — PAIN DESCRIPTION - FREQUENCY: FREQUENCY: CONTINUOUS

## 2019-05-21 ASSESSMENT — PAIN DESCRIPTION - ORIENTATION
ORIENTATION: RIGHT;LEFT
ORIENTATION: RIGHT
ORIENTATION: RIGHT

## 2019-05-21 ASSESSMENT — PAIN DESCRIPTION - DESCRIPTORS: DESCRIPTORS: ACHING;SORE

## 2019-05-21 ASSESSMENT — PAIN DESCRIPTION - PAIN TYPE
TYPE: ACUTE PAIN
TYPE: CHRONIC PAIN
TYPE: SURGICAL PAIN

## 2019-05-21 NOTE — PROGRESS NOTES
[facility]    Patient name:  Ed Vickers  Date of admission:  5/16/2019 12:12 AM  MRN:   [id number]  YOB: 1935    CC- post op     HPI-  Pt post op left hemiarthroplasty doing well   Pain much better with percocet   Hb I sstable 9.5 no new labs     Pt up and walking to bathroom        5/21   doing well   npo cp    no fever  REVIEW OF SYSTEMS:    · General----negative for fatigue, weight loss  · GI negative for nausea and vomiting, no dysphagia       EXAM-  BP (!) 166/89   Pulse 101   Temp 97.2 °F (36.2 °C) (Axillary)   Resp 20   Ht 4' 11\" (1.499 m)   Wt 132 lb 4.4 oz (60 kg)   SpO2 96%   BMI 26.72 kg/m²      · General appearance: NAD conversant  · Lungs: normal effort, clear to auscultation bilaterally,no wheeze. · Heart: regular rate and rhythm, S1, S2 normal, no murmur  · Abdomen: soft, non-tender; no masses, no organomegaly  · Extremities: no cyanosis, 1 +  edema no clubbing no synovitis      Laboratory Testing:  CBC:   Recent Labs     05/19/19  0634   WBC 8.1   HGB 9.5*        BMP:    No results for input(s): NA, K, CL, CO2, BUN, CREATININE, GLUCOSE in the last 72 hours. ASSESSMENT:    Patient Active Problem List   Diagnosis    Hypothyroidism    Osteoporosis    Esophageal reflux    Cataract    Hyperlipidemia    Osteoarthritis    Anemia    Allergic rhinitis    Asthma    Rheumatoid arthritis ([address]    Methotrexate lung    Benign essential HTN    Cushing syndrome ([address]    Closed fracture of right hip (HCC)    Closed fracture of neck of right femur with routine healing       PLAN:  Post op doing well   bp stable change to po prednisone   Change to percocet for pain control   Ok to dc to rehab        5/21   bp elevted  Increase lopressor   hb 9.8 stable    no cp/ sob   ok to d/c to MD KEVIN Enriquez[address], [address] [place] [address].    Phone [phone]

## 2019-05-21 NOTE — PROGRESS NOTES
[address] [place] [address] [address]   INPATIENT OCCUPATIONAL THERAPY  PROGRESS NOTE  Date: 2019  Patient Name: Santiago Wesley      Room: 1629/6005-05  MRN: [id number]    : 1935  (80 y.o.) Gender: female     Discharge Recommendations:  IP Rehab       Referring Practitioner: Dr. Ta Chand  Diagnosis: S/P R hip hemiarthroplasty 19  General  Chart Reviewed: Yes  Patient assessed for rehabilitation services?: Yes  Family / Caregiver Present: Yes  Referring Practitioner: Dr. Ta Chand  Diagnosis: S/P R hip hemiarthroplasty 19    Restrictions  Restrictions/Precautions: Fall Risk, Weight Bearing  Implants present? : (R hip hemiarthroplasty 19; L MARYANN)  Other position/activity restrictions: Posterolateral approach - no hip flexion greater than 90 degrees, no hip adduction across midline, no hip hyperextension, and no external rotation. Right Lower Extremity Weight Bearing: Weight Bearing As Tolerated  Required Braces or Orthoses?: No      Subjective  Subjective: Pt reports sore rt hip. Pt states she wants to go to rehab. Comments: Pt finishing PT prior to OT. OT explained to pt about rehab and how therapy has a schedule for OT and PT. OT discussed w pt hip precautions/hip safety for selfcare activities. OT explained to pt that in therapy she needs to focus on the task at hand and pt states she can do that. Patient Currently in Pain: Yes  Pain Level: 5  Pain Location: Hip  Pain Orientation: Right  Overall Orientation Status: Within Functional Limits     Pain Assessment  Pain Assessment: 0-10  Pain Level: 5  Patient's Stated Pain Goal: No pain  Pain Type: Acute pain  Pain Location: Hip  Pain Orientation: Right  Pain Descriptors: Aching, Sore  Pain Frequency: Continuous    Objective        Balance  Standing Balance: Minimal assistance  Standing Balance  Time: 7 minutes w min assist  Activity: Pt stands during lower body bathing for ana/bottom and underwear management.   Comment: Pt holds 1 hand on walker when standing for selfcare. Functional Mobility  Functional - Mobility Device: Rolling Walker  Assist Level: Minimal assistance   ADL  Feeding: Setup  Grooming: Verbal cueing;Setup(Pt states she doesn't comb her hair she has a perm and gets it done at the [employer].)  UE Bathing: Setup;Verbal cueing  LE Bathing: Moderate assistance(Pt stands to wash ana area, bottom, front of thighs. Assist by OT for bathing lower legs.)  UE Dressing: Minimal assistance(min to [facility] gown. Assist to tie gown. Pt has telemetry on. )  LE Dressing: Moderate assistance(Pt's underpants mod assist. Pt dependent to [facility] footies)  Additional Comments: Verbal cues for pt to follow hip precautions and not flex past 90 for selfcare activities. Pt states that's right . Transfers  Sit to stand: Minimal assistance  Stand to sit: Minimal assistance                             Assessment  Performance deficits / Impairments: Decreased functional mobility ; Decreased ADL status; Decreased ROM; Decreased strength;Decreased safe awareness;Decreased cognition;Decreased endurance;Decreased balance;Decreased high-level IADLs;Decreased fine motor control  Assessment: Pt seen for a.m ADLs. See ADLs for details. Pt able to be redirected onto task w ADLs completed. OT reviewed pt's tx w her son. Prognosis: Good  Discharge Recommendations: IP Rehab  Activity Tolerance: Patient Tolerated treatment well  Safety Devices in place: Yes  Type of devices: Gait belt;Patient at risk for falls; Left in chair;Call light within reach  Equipment Recommendations  Equipment Needed: Yes  Other: Long Handled Sponge          Patient Education:  Patient Education: Standing tolerance during ADLs, hip precautions  Learner:patient  Method: demonstration and explanation       Outcome: needs reinforcement     Plan  Safety Devices  Safety Devices in place: Yes  Type of devices: Gait belt, Patient at risk for falls, Left in chair, Call light within reach  Plan  Times per week: 5  Times per day: Daily  Current Treatment Recommendations: Self-Care / ADL, Balance Training, Functional Mobility Training, Endurance Training, Pain Management, Safety Education & Training, Patient/Caregiver Education & Training, Equipment Evaluation, Education, & procurement, Positioning  Plan Comment: continue OT      Goals  Short term goals  Time Frame for Short term goals: By discharge  Short term goal 1: Pt will V/D Good understanding of AE/DME/modified techniques for increased IND with self-care and mobility. Short term goal 2: Pt will actively participate in 10+ minutes of self-care to the best of Pt's ability to promote increased IND with self-care. Short term goal 3: Pt will perform bed mobility with Max A x 1 to sit EOB for increased participation with self-care. Short term goal 4: Pt will perform sit to stand transfer with Max A x 2 to RW with Good safety awareness. Short term goal 5: Pt will actively participate in 10-15 minutes in therapeutic exercise/functional activities to promote increased IND with self-care and mobility.     OT Individual Minutes  Time In: 1100  Time Out: 5012  Minutes: 42     05/21/19 1100   OT Individual Minutes   Time In 1100   Time Out 9775   Minutes 42   Time Code Minutes    Timed Code Treatment Minutes 42 Minutes       Electronically signed by Elmira Moore OT on 5/21/19 at 12:20 PM

## 2019-05-21 NOTE — PROGRESS NOTES
[address]   OCCUPATIONAL THERAPY MISSED TREATMENT NOTE   INPATIENT   Date: 19  Patient Name: Hugh Velez       Room: 2457/2883-21  MRN: [id number]   Account #: [de-identified]    : 1935  (80 y.o.)  Gender: female   Referring Practitioner: Dr. Ricky Schwartz  Diagnosis: S/P R hip hemiarthroplasty 19             REASON FOR MISSED TREATMENT:    19 (09:05 pt eating,will attempt OT later.     Rosaura Zaman, OT

## 2019-05-21 NOTE — PLAN OF CARE
Problem: HEMODYNAMIC STATUS  Goal: Patient has stable vital signs and fluid balance  5/21/2019 1808 by Vero Sultana RN  Outcome: Met This Shift  5/21/2019 0458 by Efren Peter RN  Outcome: Ongoing     Problem: Pain:  Goal: Pain level will decrease  Description  Pain level will decrease  5/21/2019 1808 by Vero Sultana RN  Outcome: Met This Shift  5/21/2019 0458 by Efren Peter RN  Outcome: Ongoing  Note:   Pain decreased with prescribed medication. Goal: Control of acute pain  Description  Control of acute pain  Outcome: Met This Shift  Goal: Control of chronic pain  Description  Control of chronic pain  Outcome: Met This Shift     Problem: Falls - Risk of:  Goal: Will remain free from falls  Description  Will remain free from falls  5/21/2019 1808 by Vero Sultana RN  Outcome: Met This Shift  5/21/2019 0458 by Efren Peter RN  Outcome: Ongoing  Note:   No falls this shift. Call light within reach and siderails x2. Bed in lowest position. Patient safety maintained. Goal: Absence of physical injury  Description  Absence of physical injury  5/21/2019 1808 by Vero Sultana RN  Outcome: Met This Shift  5/21/2019 0458 by Efren Peter RN  Outcome: Ongoing  Note:   No falls this shift. Call light within reach and siderails x2. Bed in lowest position. Patient safety maintained. Problem: Musculor/Skeletal Functional Status  Goal: Highest potential functional level  Outcome: Met This Shift     Problem: Musculor/Skeletal Functional Status  Goal: Highest potential functional level  Outcome: Met This Shift  Goal: Absence of falls  5/21/2019 1808 by Vero Sultana RN  Outcome: Met This Shift  5/21/2019 0458 by Efren Peter RN  Outcome: Ongoing  Note:   No falls this shift. Call light within reach and siderails x2. Bed in lowest position. Patient safety maintained.

## 2019-05-21 NOTE — CARE COORDINATION
DISCHARGE PLANNING NOTE:    LSW continues to follow for d/c to ARU. POD#4 right hip hemiarthroplasty. Will continue to follow for additional d/c needs.       Electronically signed by Jose Butler RN on 5/21/2019 at 12:01 PM

## 2019-05-21 NOTE — PROGRESS NOTES
[address] [facility]    Patient name:  Jakob Lopez  Date of admission:  5/16/2019 12:12 AM  MRN:   [id number]  YOB: 1935    CC- post op     HPI-  Pt post op left hemiarthroplasty doing well   Pain much better with percocet   Hb I sstable 9.5 no new labs     Pt up and walking to bathroom     REVIEW OF SYSTEMS:    · General----negative for fatigue, weight loss  · GI negative for nausea and vomiting, no dysphagia       EXAM-  BP (!) 163/70   Pulse 87   Temp 98 °F (36.7 °C) (Oral)   Resp 18   Ht 4' 11\" (1.499 m)   Wt 132 lb 4.4 oz (60 kg)   SpO2 94%   BMI 26.72 kg/m²      · General appearance: NAD conversant  · Lungs: normal effort, clear to auscultation bilaterally,no wheeze. · Heart: regular rate and rhythm, S1, S2 normal, no murmur  · Abdomen: soft, non-tender; no masses, no organomegaly  · Extremities: no cyanosis, 1 +  edema no clubbing no synovitis      Laboratory Testing:  CBC:   Recent Labs     05/19/19  0634   WBC 8.1   HGB 9.5*        BMP:    No results for input(s): NA, K, CL, CO2, BUN, CREATININE, GLUCOSE in the last 72 hours. ASSESSMENT:    Patient Active Problem List   Diagnosis    Hypothyroidism    Osteoporosis    Esophageal reflux    Cataract    Hyperlipidemia    Osteoarthritis    Anemia    Allergic rhinitis    Asthma    Rheumatoid arthritis ([address]    Methotrexate lung    Benign essential HTN    Cushing syndrome ([facility]    Closed fracture of right hip (HCC)    Closed fracture of neck of right femur with routine healing       PLAN:  Post op doing well   bp stable change to po prednisone   Change to percocet for pain control   Ok to dc to rehab     Check cbc am     MD KEVIN Khan[address], [address] [place] [address].    Phone [phone]   Fax: [phone]  Answering Service: [phone]

## 2019-05-21 NOTE — PLAN OF CARE
Problem: HEMODYNAMIC STATUS  Goal: Patient has stable vital signs and fluid balance  Outcome: Ongoing     Problem: Pain:  Goal: Pain level will decrease  Description  Pain level will decrease  5/21/2019 0458 by Sheri Sanderson RN  Outcome: Ongoing  Note:   Pain decreased with prescribed medication. Problem: Falls - Risk of:  Goal: Will remain free from falls  Description  Will remain free from falls  5/21/2019 0458 by Sheri Sanderson RN  Outcome: Ongoing  Note:   No falls this shift. Call light within reach and siderails x2. Bed in lowest position. Patient safety maintained. Problem: Falls - Risk of:  Goal: Absence of physical injury  Description  Absence of physical injury  Outcome: Ongoing  Note:   No falls this shift. Call light within reach and siderails x2. Bed in lowest position. Patient safety maintained. Problem: Musculor/Skeletal Functional Status  Goal: Absence of falls  Outcome: Ongoing  Note:   No falls this shift. Call light within reach and siderails x2. Bed in lowest position. Patient safety maintained.

## 2019-05-21 NOTE — PROGRESS NOTES
[facility]   Acute Inpatient Rehab Preadmission Assessment    Patient Name: Brayden Cobian        MRN: [id number]    : 1935  (80 y.o.)  Gender: female     Admitted from:   [address] [place] [address]  [][facility]   [][facility]   []Outside Admission - Location:                                 [x]Initial         []Updated    Date of Onset / admission to the acute hospital:  5-16-19    Impairment group:  8.11 Unilateral Hip Fracture    Did patient have surgery? []No  [x]Yes:  PROCEDURE PERFORMED:  Right hip hemiarthroplasty Press-Fit Echo Stem. Physicians: Wander Carroll    Risk for clinical complications:   Moderate    Co-morbidities:     Allergic rhinitis      Anemia      Anxiety      Asthma      Cataract      COPD (chronic obstructive pulmonary disease) (HCC)      CTS (carpal tunnel syndrome)      Esophageal reflux      Headache(784.0)      Hyperlipidemia      Hypertension      Hypothyroidism      Osteoarthritis      Osteoporosis      Rheumatoid arthritis(714.0)              Financial Information  Primary insurance:  [x]Medicare [] Medicare HMO  []Commercial insurance    []Medicaid   [] Medicaid HMO []Workers Compensation   []Personal Pay    Secondary Insurance:  []Medicare [] Medicare HMO  [x]Commercial insurance    []Medicaid   []Workers Compensation []None    Precautions:   []Cardiac Precautions  [x]Total hip precautions    []Weight Bearing status:  [x]Safety Precautions/Concerns  [x]Visually impaired  Wears glasses all the time     []Hard of Hearing     Isolation Precautions:         []Yes  [x]No  If Yes:  [] Droplet  []Contact []Airborne     []VRE     []MRSA     []C-diff         [] TB       [] Other:        Physiatrist:  [x]        Patients Occupation: Retired    Reviewed Lab and Diagnostic reports from Current Admission: Yes    Patients Prior Functional  Level: Prior Function  Lives With: Spouse  ADL Assistance: Independent  Homemaking Assistance: Needs assistance(Pt does laundry and shares cooking/cleaning w/ spouse)  Ambulation Assistance: Independent(w/ SPC depending on weather/how she is feeling)  Transfer Assistance: Independent  Additional Comments: Pt reports that her spouse and son can provide A PRN upon d/c. History of current illness: The patient is a 80 y.o. female  who presents with sever Ra with fall yesterday acute right hip pain inability to walk. Current functional status for upper extremity ADLs:  UE Bathing: Setup, Verbal cueing  UE Dressing: Minimal assistance(min to [facility] gown. Assist to tie gown. Pt has telemetry on. )    Current functional status for lower extremity ADLs:  LE Bathing: Moderate assistance(Pt stands to wash ana area, bottom, front of thighs. Assist by OT for bathing lower legs.)  LE Dressing: Moderate assistance(Pt's underpants mod assist. Pt dependent to [facility] footies)    Current functional status for bed, chair, wheelchair transfers:  Transfers  Sit to Stand: Minimal Assistance  Stand to sit: Minimal Assistance  Bed to Chair: Moderate assistance(BSC)  Stand Pivot Transfers: Moderate Assistance  Squat Pivot Transfers: 2 Person Assistance(max back fearful fatigued)  Comment: Pt requires vc's for safe hand placement. Fair +carryover. Current functional status for toilet transfers:  Minimal Assistance  Moderate assistance(BSC)       Current functional status for locomotion:  Ambulation  Ambulation?: Yes  Ambulation 1  Surface: level tile  Device: Rolling Walker  Assistance: Minimal assistance  Quality of Gait: PT demos decrease reece, decrease step length, forward flexed posture, and downward gaze. Pt requires vc's for technique.    Distance: 5'x2  Comments: slow reece takes more time    Current functional status for comprehension: Complete independence    Current functional status for expression: Complete independence    Current functional status for social interaction: Complete independence    Current functional

## 2019-05-21 NOTE — PROGRESS NOTES
Spoke with Juan Alberto Lacy, who states pt is medically ready for ARU. Writer requests d/c readmit be completed, DVT prophylaxis be continued, and [place] be called to [phone]. Prescreen completed and Dr Farhat Lawton notified.

## 2019-05-22 PROBLEM — Z87.81 S/P RIGHT HIP FRACTURE: Status: ACTIVE | Noted: 2019-05-22

## 2019-05-22 PROBLEM — Z96.641 HISTORY OF HEMIARTHROPLASTY OF RIGHT HIP: Status: ACTIVE | Noted: 2019-05-21

## 2019-05-22 PROCEDURE — 6370000000 HC RX 637 (ALT 250 FOR IP): Performed by: PHYSICAL MEDICINE & REHABILITATION

## 2019-05-22 PROCEDURE — 6370000000 HC RX 637 (ALT 250 FOR IP): Performed by: ORTHOPAEDIC SURGERY

## 2019-05-22 PROCEDURE — 97530 THERAPEUTIC ACTIVITIES: CPT

## 2019-05-22 PROCEDURE — 1180000000 HC REHAB R&B

## 2019-05-22 PROCEDURE — 97116 GAIT TRAINING THERAPY: CPT

## 2019-05-22 PROCEDURE — 94761 N-INVAS EAR/PLS OXIMETRY MLT: CPT

## 2019-05-22 PROCEDURE — 99232 SBSQ HOSP IP/OBS MODERATE 35: CPT | Performed by: INTERNAL MEDICINE

## 2019-05-22 PROCEDURE — 6370000000 HC RX 637 (ALT 250 FOR IP): Performed by: NURSE PRACTITIONER

## 2019-05-22 PROCEDURE — 97110 THERAPEUTIC EXERCISES: CPT

## 2019-05-22 PROCEDURE — 99223 1ST HOSP IP/OBS HIGH 75: CPT | Performed by: PHYSICAL MEDICINE & REHABILITATION

## 2019-05-22 PROCEDURE — 97166 OT EVAL MOD COMPLEX 45 MIN: CPT

## 2019-05-22 PROCEDURE — 97162 PT EVAL MOD COMPLEX 30 MIN: CPT

## 2019-05-22 PROCEDURE — 97535 SELF CARE MNGMENT TRAINING: CPT

## 2019-05-22 PROCEDURE — 94640 AIRWAY INHALATION TREATMENT: CPT

## 2019-05-22 RX ORDER — SENNA AND DOCUSATE SODIUM 50; 8.6 MG/1; MG/1
1 TABLET, FILM COATED ORAL 2 TIMES DAILY
Status: DISCONTINUED | OUTPATIENT
Start: 2019-05-22 | End: 2019-06-04 | Stop reason: HOSPADM

## 2019-05-22 RX ORDER — LIDOCAINE 4 G/G
1 PATCH TOPICAL DAILY
Status: DISCONTINUED | OUTPATIENT
Start: 2019-05-22 | End: 2019-06-04 | Stop reason: HOSPADM

## 2019-05-22 RX ORDER — BISACODYL 10 MG
10 SUPPOSITORY, RECTAL RECTAL DAILY PRN
Status: DISCONTINUED | OUTPATIENT
Start: 2019-05-22 | End: 2019-06-04 | Stop reason: HOSPADM

## 2019-05-22 RX ORDER — MAGNESIUM HYDROXIDE/ALUMINUM HYDROXICE/SIMETHICONE 120; 1200; 1200 MG/30ML; MG/30ML; MG/30ML
30 SUSPENSION ORAL EVERY 6 HOURS PRN
Status: DISCONTINUED | OUTPATIENT
Start: 2019-05-22 | End: 2019-06-04

## 2019-05-22 RX ADMIN — METOPROLOL TARTRATE 50 MG: 50 TABLET ORAL at 08:01

## 2019-05-22 RX ADMIN — LORAZEPAM 0.5 MG: 0.5 TABLET ORAL at 16:06

## 2019-05-22 RX ADMIN — PREDNISONE 10 MG: 10 TABLET ORAL at 08:01

## 2019-05-22 RX ADMIN — IPRATROPIUM BROMIDE AND ALBUTEROL SULFATE 1 AMPULE: .5; 3 SOLUTION RESPIRATORY (INHALATION) at 08:26

## 2019-05-22 RX ADMIN — OXYCODONE HYDROCHLORIDE AND ACETAMINOPHEN 1 TABLET: 5; 325 TABLET ORAL at 16:07

## 2019-05-22 RX ADMIN — DOCUSATE SODIUM 100 MG: 100 CAPSULE, LIQUID FILLED ORAL at 08:01

## 2019-05-22 RX ADMIN — POLYETHYLENE GLYCOL 3350 17 G: 17 POWDER, FOR SOLUTION ORAL at 22:56

## 2019-05-22 RX ADMIN — IPRATROPIUM BROMIDE AND ALBUTEROL SULFATE 1 AMPULE: .5; 3 SOLUTION RESPIRATORY (INHALATION) at 15:31

## 2019-05-22 RX ADMIN — OXYCODONE HYDROCHLORIDE AND ACETAMINOPHEN 1 TABLET: 5; 325 TABLET ORAL at 22:37

## 2019-05-22 RX ADMIN — LEVOTHYROXINE SODIUM 50 MCG: 50 TABLET ORAL at 06:15

## 2019-05-22 RX ADMIN — PANTOPRAZOLE SODIUM 40 MG: 40 TABLET, DELAYED RELEASE ORAL at 06:15

## 2019-05-22 RX ADMIN — BISACODYL 10 MG: 10 SUPPOSITORY RECTAL at 22:38

## 2019-05-22 RX ADMIN — LOSARTAN POTASSIUM 25 MG: 25 TABLET ORAL at 08:01

## 2019-05-22 RX ADMIN — OXYCODONE HYDROCHLORIDE AND ACETAMINOPHEN 1 TABLET: 5; 325 TABLET ORAL at 11:58

## 2019-05-22 RX ADMIN — LORAZEPAM 0.5 MG: 0.5 TABLET ORAL at 08:22

## 2019-05-22 RX ADMIN — SENNOSIDES,DOCUSATE SODIUM 1 TABLET: 8.6; 5 TABLET, FILM COATED ORAL at 06:15

## 2019-05-22 RX ADMIN — ALUMINUM HYDROXIDE, MAGNESIUM HYDROXIDE, AND SIMETHICONE 30 ML: 200; 200; 20 SUSPENSION ORAL at 02:44

## 2019-05-22 RX ADMIN — RIVAROXABAN 10 MG: 10 TABLET, FILM COATED ORAL at 17:53

## 2019-05-22 RX ADMIN — METOPROLOL TARTRATE 50 MG: 50 TABLET ORAL at 22:37

## 2019-05-22 RX ADMIN — IPRATROPIUM BROMIDE AND ALBUTEROL SULFATE 1 AMPULE: .5; 3 SOLUTION RESPIRATORY (INHALATION) at 11:59

## 2019-05-22 RX ADMIN — OXYCODONE HYDROCHLORIDE AND ACETAMINOPHEN 1 TABLET: 5; 325 TABLET ORAL at 08:22

## 2019-05-22 RX ADMIN — SENNOSIDES,DOCUSATE SODIUM 1 TABLET: 8.6; 5 TABLET, FILM COATED ORAL at 22:37

## 2019-05-22 RX ADMIN — OXYCODONE HYDROCHLORIDE AND ACETAMINOPHEN 1 TABLET: 5; 325 TABLET ORAL at 02:44

## 2019-05-22 RX ADMIN — IPRATROPIUM BROMIDE AND ALBUTEROL SULFATE 1 AMPULE: .5; 3 SOLUTION RESPIRATORY (INHALATION) at 20:38

## 2019-05-22 RX ADMIN — LORAZEPAM 0.5 MG: 0.5 TABLET ORAL at 02:44

## 2019-05-22 ASSESSMENT — PAIN DESCRIPTION - ORIENTATION
ORIENTATION: RIGHT

## 2019-05-22 ASSESSMENT — PAIN SCALES - GENERAL
PAINLEVEL_OUTOF10: 5
PAINLEVEL_OUTOF10: 0
PAINLEVEL_OUTOF10: 6
PAINLEVEL_OUTOF10: 5
PAINLEVEL_OUTOF10: 8
PAINLEVEL_OUTOF10: 7
PAINLEVEL_OUTOF10: 6
PAINLEVEL_OUTOF10: 5
PAINLEVEL_OUTOF10: 3
PAINLEVEL_OUTOF10: 6

## 2019-05-22 ASSESSMENT — PAIN DESCRIPTION - LOCATION
LOCATION: HIP

## 2019-05-22 ASSESSMENT — PAIN DESCRIPTION - PAIN TYPE
TYPE: ACUTE PAIN
TYPE: ACUTE PAIN;SURGICAL PAIN
TYPE: SURGICAL PAIN
TYPE: ACUTE PAIN

## 2019-05-22 ASSESSMENT — PAIN DESCRIPTION - ONSET: ONSET: SUDDEN

## 2019-05-22 ASSESSMENT — PAIN DESCRIPTION - DESCRIPTORS: DESCRIPTORS: ACHING;DISCOMFORT

## 2019-05-22 ASSESSMENT — PAIN DESCRIPTION - FREQUENCY: FREQUENCY: CONTINUOUS

## 2019-05-22 NOTE — CONSULTS
Nutrition Assessment    Type and Reason for Visit: Consult(Eval & treat. PNS under consult section -discomfort with portions)    Nutrition Recommendations: Continue diet as ordered. Nutrition Assessment: Pt adequately nourished on admit as evidenced by good PO intake. Pt talked at length about her doctor telling her she needs to eat smaller portions since she often overeats. Malnutrition Assessment:  · Malnutrition Status: No malnutrition  · Context: Acute illness or injury  1. Weight Loss-No significant weight loss,    2. Fat Loss-No significant subcutaneous fat loss,    3. Muscle Loss-No significant muscle mass loss,    4. Fluid Accumulation-No significant fluid accumulation,    5.  Strength-Not measured    Nutrition Risk Level: Moderate    Nutrient Needs:  · Estimated Daily Total Kcal: 1475 kcal based on 25 kcal/kg admission wt  · Estimated Daily Protein (g): 65-71 gm pro based on 1.1-1.2 gm pro/kg IBW    Nutrition Diagnosis:   · Problem: Increased nutrient needs  · Etiology: related to Other (Comment)(Hip repair)     Signs and symptoms:  as evidenced by Other (Comment)(increased needs for healing)    Objective Information:  · Nutrition-Focused Physical Findings:    · Wound Type: Surgical Wound  · Current Nutrition Therapies:  · Oral Diet Orders: General   · Oral Diet intake: 51-75%, %  · Anthropometric Measures:  · Ht: 4' 11\" (149.9 cm)   · Current Body Wt: 130 lb (59 kg)  · Admission Body Wt: 130 lb (59 kg)  · Ideal Body Wt: 105 lb (47.6 kg)(used high end of range), % Ideal Body 124%  · BMI Classification: BMI 25.0 - 29.9 Overweight    Nutrition Interventions:   Continue current diet  Continued Inpatient Monitoring, Education Not Indicated    Nutrition Evaluation:   · Evaluation: Goals set   · Goals: Adequate nutrition intake or provision    · Monitoring: Meal Intake, Weight, Pertinent Labs, Monitor Bowel Function, I&O, TOM Newell R.D.   Clinical Dietitian  Office: [phone]

## 2019-05-22 NOTE — H&P
Physical Medicine & Rehabilitation History and Physical  [facility]     Primary care provider: Yue Jacobson MD     Chief Complaint and Reason for Rehabilitation Admission:   Mobility and ADL dysfunction secondary right hip fracture    History of Present Illness:  Yesi Newman  is a 80 y.o. right-handed    female admitted to the [address] [place] [address] on 5/21/2019.       80-year-old female admitted 5/16/19 with history of severe rheumatoid arthritis status post fall with right hip pain . Found have right femoral neck fracture displaced. Underwent a right hip hemiarthroplasty by Dr. Petrona Acosta on 5/17/19. Postop anemia. Resumed oral prednisone by internal medicine. Patient weightbearing as tolerated right lower extremity per orthopedics. 5/60/19 x-ray right hip  Subcapital right femur fracture as described    Merlyn is currently requiring assistance for self-care activities and mobility prompting this admission. Premorbid function:  Ambulate with a single-point cane on occasional basis  Current Function:  PT:  Restrictions/Precautions: Fall Risk, Weight Bearing  Implants present? : (R hip hemiarthroplasty 5/17/19; L MARYANN)  Hip Precautions: No hip flexion > 90 degrees, No ADduction, No hip extension, No hip external rotation  Other position/activity restrictions: Posterolateral approach - no hip flexion greater than 90 degrees, no hip adduction across midline, no hip hyperextension, and no external rotation.   Right Lower Extremity Weight Bearing: Weight Bearing As Tolerated(Pb-arthroplasty )   Transfers  Sit to Stand: Minimal Assistance  Stand to sit: Minimal Assistance  WB Status: wbat R  Ambulation 1  Surface: level tile  Device: Rolling ERVFDH(7'38\")  Assistance: Contact guard assistance  Quality of Gait: very short steps, poor foot clearance, good sequencing  Distance: 10 ft x 2 in 10 min  Comments: slow reece, good sequence    Transfers  Sit to Stand: Minimal Assistance  Stand to sit: Minimal Assistance  Ambulation  Ambulation?: Yes  WB Status: wbat R  Ambulation 1  Surface: level tile  Device: Rolling EPSMDY(3'94\")  Assistance: Contact guard assistance  Quality of Gait: very short steps, poor foot clearance, good sequencing  Distance: 10 ft x 2 in 10 min  Comments: slow reece, good sequence    Surface: level tile  Ambulation 1  Surface: level tile  Device: Rolling SEIQMK(0'33\")  Assistance: Contact guard assistance  Quality of Gait: very short steps, poor foot clearance, good sequencing  Distance: 10 ft x 2 in 10 min  Comments: slow reece, good sequence      OT:   Pt needs redirected to keep mind on task. Therapist demonstrated reacher, sock aid with patient successfully doff and don sock. Discussed easiest clothing to wear and easiest way to dress. Wheeled stepstool placed with good understanding of the importance in moving affected leg to decrease pain throughout the day with prolonged sitting. Reiterated that transfers must be done with staff. Had ice on hip when therapist entered room. Son present during tx.   Continue POC   Transfers  Sit to stand: Minimal assistance  Stand to sit: Modified independent  Toilet Transfers  Toilet - Technique: Ambulating  Toilet Transfer: Minimal assistance   used r/w         Past Medical History:      Diagnosis Date    Allergic rhinitis     Anemia     Anxiety     Asthma     Cataract     [facility]chronic obstructive pulmonary disease) [facility]     CTS (carpal tunnel syndrome)     Esophageal reflux     Headache(784.0)     Hyperlipidemia     Hypertension     Hypothyroidism     Osteoarthritis     Osteoporosis     Rheumatoid arthritis(714.0)        Past Surgical History:      Procedure Laterality Date    CATARACT REMOVAL WITH IMPLANT Bilateral     CYSTOCELE REPAIR      DILATION AND CURETTAGE OF UTERUS      four times    FOOT SURGERY      twice due to infection    HAMMER TOE SURGERY      right foot    HEMIARTHROPLASTY HIP Right 5/17/2019    HIP HEMIARTHROPLASTY performed by Janis Ibarra MD at [facility], VAGINAL      INGUINAL HERNIA REPAIR      x2    JOINT REPLACEMENT Left     left hip    OTHER SURGICAL HISTORY      joint replacement right big toe    OTHER SURGICAL HISTORY      tumor removal from toe - pt not sure which toe    ROTATOR CUFF REPAIR Left     also frozen shoulder    TONSILLECTOMY AND ADENOIDECTOMY         Allergies:    No known allergies and Seasonal    Medications   Scheduled Meds:   sennosides-docusate sodium  1 tablet Oral BID    docusate sodium  100 mg Oral BID    rivaroxaban  10 mg Oral Daily    ipratropium-albuterol  1 ampule Inhalation Q4H WA    levothyroxine  50 mcg Oral Daily    lidocaine  1 patch Transdermal Daily    losartan  25 mg Oral Daily    metoprolol tartrate  50 mg Oral BID    pantoprazole  40 mg Oral QAM AC    polyethylene glycol  17 g Oral Daily    predniSONE  10 mg Oral Daily     Continuous Infusions:  PRN Meds:.aluminum & magnesium hydroxide-simethicone, bisacodyl, magnesium hydroxide, acetaminophen, LORazepam, oxyCODONE-acetaminophen       Diagnostics:       CBC: No results for input(s): WBC, RBC, HGB, HCT, MCV, RDW, PLT in the last 72 hours. BMP: No results for input(s): NA, K, CL, CO2, PHOS, BUN, CREATININE in the last 72 hours. Invalid input(s): CA  BNP: No results for input(s): BNP in the last 72 hours. PT/INR: No results for input(s): PROTIME, INR in the last 72 hours. APTT: No results for input(s): APTT in the last 72 hours. CARDIAC ENZYMES: No results for input(s): CKMB, CKMBINDEX, TROPONINT in the last 72 hours. Invalid input(s): CKTOTAL;3  FASTING LIPID PANEL:No results found for: CHOL, HDL, TRIG  LIVER PROFILE: No results for input(s): AST, ALT, ALB, BILIDIR, BILITOT, ALKPHOS in the last 72 hours. I/O (24Hr):   No intake or output data in the 24 hours ending 05/22/19 1502    Glu last 24 hour  No results for input(s): POCGLU in the last 72 hours. No results for input(s): CLARITYU, COLORU, PHUR, SPECGRAV, PROTEINU, RBCUA, BLOODU, BACTERIA, NITRU, WBCUA, LEUKOCYTESUR, YEAST, GLUCOSEU, BILIRUBINUR in the last 72 hours. Social History:  Dwelling House - 1 story. Steps to enter:  2,  Bed/bathroom level:  1.  laudry in basement  Lives with:   Tobacco: none  Alcohol usage:  none  Illicit: none  Activity level:  normal activities of daily living    Family History:       Problem Relation Age of Onset    Cancer Mother     Hypertension Mother     Heart Disease Mother     Stroke Maternal Grandmother     Heart Disease Maternal Grandmother     Cancer Paternal Grandmother     Heart Disease Paternal Grandmother     Heart Disease Father     Heart Disease Maternal Grandfather     Heart Disease Paternal Grandfather        Review of Systems:  CONSTITUTIONAL:  Denies fevers, chills, sweats or fatigue. EYES:  Denies diplopia, blind spots, blurring. HEENT:  Denies hearing loss, trouble chewing or swallowing. RESPIRATORY:  No wheezing, coughing, shortness of breath. CARDIOVASCULAR:  Denies chest pain, palpitations, lightheadedness. GASTROINTESTINAL:  Denies heartburn, nausea, constipation, diarrhea, abdominal pain. GENITOURINARY:  No urgency, frequency, incontinence, dysuria. ENDOCRINE:  Denies hot or cold intolerance. MUSCULOSKELETAL:  Denies focal joint pain, back pain, neck pain. NEUROLOGICAL:  Denies focal weakness, numbness, tingling, balance loss, headache. BEHAVIOR/PSYCH:  Denies depression, anxiety, memory loss, insomnia. SKIN:  No ulcers, rash, bruises. Physical Exam:  /80   Pulse 89   Temp 97.7 °F (36.5 °C) (Oral)   Resp 16   Ht 4' 11\" (1.499 m)   SpO2 95%   BMI 26.72 kg/m²   HEENT:  Symmetrical facial features. EOMI. Visual fields intact. Hearing intact. Speech fluent, no dystarthria. Comprehension intact. Object naming intact. hemorrhoids-Protonix, MiraLAX, Senokot S  12. DVT prophylaxis-Xarelto  13. Internal medicine for medical management    DVT Prophylaxis:  Xaralto    Estimated Length of Stay:  1 weeks. Prognosis  good    Goals    Home at Supervision   Intermittent      Ruther Ganser. Gisela Hobbs MD       This note is created with the assistance of a speech recognition program.  While intending to generate a document that actually reflects the content of the visit, the document can still have some errors including those of syntax and sound a like substitutions which may escape proof reading.   In such instances, actual meaning can be extrapolated by contextual diversion

## 2019-05-22 NOTE — PROGRESS NOTES
HR  Bathroom Shower/Tub: Walk-in shower, Tub/Shower unit, Shower chair with back(typically uses tub for shower )  Bathroom Toilet: Handicap height  Bathroom Equipment: Grab bars around toilet, Shower chair, Hand-held shower  Bathroom Accessibility: Accessible  Home Equipment: [employer], Grab bars, Cane, Quad cane  Receives Help From: Family  ADL Assistance: Independent  Homemaking Assistance: Needs assistance  Laundry: (Others carry laundry )  Homemaking Responsibilities: Yes  Ambulation Assistance: Independent(cane)  Transfer Assistance: Independent  Active : No  Patient's  Info: Pt's spouse  Mode of Transportation: Car, Family  Additional Comments: Son reports that her spouse and he can provide A PRN upon d/c. Cognition        Objective          PROM RLE (degrees)  RLE General PROM: AA hip flex to 90*, ABD 10*  AROM RLE (degrees)  RLE General AROM: hip flex 70* seated  AROM LLE (degrees)  LLE AROM : WNL  Strength RLE  Strength RLE: Exception  Comment: grossly 2/5  Strength LLE  Strength LLE: Exception  Comment: grossly 3/5        Bed mobility  Bridging: Stand by assistance(per 5/18 note)  Scooting: Contact guard assistance(per 5/21 note)  Transfers  Sit to Stand: Minimal Assistance  Stand to sit: Minimal Assistance  Ambulation  Ambulation?: Yes  WB Status: wbat R  Ambulation 1  Surface: level tile  Device: Rolling NHTKKO(0'69\")  Assistance: Contact guard assistance  Quality of Gait: very short steps, poor foot clearance, good sequencing  Distance: 10 ft x 2 in 10 min  Comments: slow reece, good sequence  Stairs/Curb  Stairs?: Yes  Stairs  # Steps : 1  Stairs Height: 8\"  Rails: None  Device: Rolling walker  Assistance:  Moderate assistance  Comment: Stepped up fwd/down retro 3x then up fwd, down fwd 1x. (max vc for sequencing)     Balance  Posture: Poor  Sitting - Static: Good  Sitting - Dynamic: Good  Standing - Static: Fair;+  Standing - Dynamic: Fair  Comments: kyphotic posture with head down  Other exercises  Other exercises?: Yes  Other exercises 1: LAQ x 10 bilat  Other exercises 2: AA hip ab/ad seated  Other exercises 3: AA hip flex R/active L x 10   AMPAC=CK  Plan   Plan  Times per week: 5-7 days per week; 1.5 hrs/day  Times per day: Twice a day  Plan weeks: 2  Current Treatment Recommendations: Strengthening, ROM, Balance Training, Transfer Training, Gait Training, Stair training, Neuromuscular Re-education, Functional Mobility Training, Home Exercise Program, Safety Education & Training, Patient/Caregiver Education & Training, Wheelchair Mobility Training, Equipment Evaluation, Education, & procurement  Safety Devices  Type of devices: Left in chair, Gait belt, Call light within reach    Goals  Short term goals  Time Frame for Short term goals: by 1 week  Short term goal 1: supervidion bed mobility  Short term goal 2: transfers Min A from multiple surfaces  Short term goal 3: Ambulate 30 ft with RW in < 3 min   Short term goal 4: Amb up/down 5 stairs el HR mod A x 1  Short term goal 5: min A WC mobility 25 ft using BLE.   Long term goals  Time Frame for Long term goals : by discharge  Long term goal 1: Bed mobility Mod I  Long term goal 2: Transfers Mod I multiple surfaces  Long term goal 3: Ambulate 50-60 ft MI with RW  Long term goal 4: Amb up/down 5 steps el HR min A x 1  Long term goal 5: pt will be indep with wc mobility 50 ft       Therapy Time   Individual Concurrent Group Co-treatment   Time In 0915         Time Out 1001         Minutes 46         Timed Code Treatment Minutes: [address] More Maki, PT

## 2019-05-22 NOTE — PLAN OF CARE
Nutrition Problem: Increased nutrient needs  Intervention: Food and/or Nutrient Delivery: Continue current diet  Nutritional Goals: Adequate nutrition intake or provision

## 2019-05-22 NOTE — DISCHARGE INSTR - COC
Continuity of Care Form    Patient Name: Gerard Duarte   :  1935  MRN:  [id number]    Admit date:  2019  Discharge date:      Code Status Order: Full Code   Advance Directives:   [facility] Documentation     Date/Time Healthcare Directive Type of Healthcare Directive Copy in [address] Agent's Name Healthcare Agent's Phone Number    19  Yes, patient has an advance directive for healthcare treatment -- -- -- -- --          Admitting Physician:  José Miguel Juarez MD  PCP: Guillermina Escamilla MD    Discharging Nurse: Lori Jack Unit/Room#: 4170/6234-84  Discharging Unit Phone Number: [phone]    Emergency Contact:   Extended Emergency Contact Information  Primary Emergency Contact: Prosper Mcmillan  Address: [address] [place] [address] [address], [address] Phone: [phone]  Relation: Spouse  Secondary Emergency Contact: Carlos Mcmillan  Address: [address] Phone: [phone]  Relation: Child    Past Surgical History:  Past Surgical History:   Procedure Laterality Date    CATARACT REMOVAL WITH IMPLANT Bilateral     CYSTOCELE REPAIR      DILATION AND CURETTAGE OF UTERUS      four times    FOOT SURGERY      twice due to infection    HAMMER TOE SURGERY      right foot    HEMIARTHROPLASTY HIP Right 2019    HIP HEMIARTHROPLASTY performed by Fortunato Mackenzie MD at [address]      x2    JOINT REPLACEMENT Left     left hip    OTHER SURGICAL HISTORY      joint replacement right big toe    OTHER SURGICAL HISTORY      tumor removal from toe - pt not sure which toe    ROTATOR CUFF REPAIR Left     also frozen shoulder    TONSILLECTOMY AND ADENOIDECTOMY         Immunization History:   Immunization History   Administered Date(s) Administered    Influenza Vaccine, unspecified formulation 2014    Influenza, High Dose (Fluzone 65 yrs and older) 09/07/2016, 10/27/2017    Influenza, Triv, inactivated, subunit, adjuvanted, IM (Fluad 65 yrs and older) 11/20/2018    Pneumococcal 13-valent Conjugate (Arvdhlz31) 08/18/2015    Pneumococcal Polysaccharide (Sdsdbunsa59) 09/07/2016    Tdap (Boostrix, Adacel) 03/09/2018       Active Problems:  Patient Active Problem List   Diagnosis Code    Hypothyroidism E03.9    Osteoporosis M81.0    Esophageal reflux K21.9    Cataract H26.9    Hyperlipidemia E78.5    Osteoarthritis M19.90    Anemia D64.9    Allergic rhinitis J30.9    Asthma J45.909    Rheumatoid arthritis (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) E24.9    Closed fracture of right hip ([facility]) S72.001A    Closed fracture of neck of right femur with routine healing S72.001D    History of left hip hemiarthroplasty Z96.642    S/P right hip fracture Z87.81       Isolation/Infection:   Isolation          No Isolation            Nurse Assessment:  Last Vital Signs: /80   Pulse 89   Temp 97.7 °F (36.5 °C) (Oral)   Resp 16   Ht 4' 11\" (1.499 m)   SpO2 95%   BMI 26.72 kg/m²     Last documented pain score (0-10 scale): Pain Level: 5  Last Weight:   Wt Readings from Last 1 Encounters:   05/16/19 132 lb 4.4 oz (60 kg)     Mental Status:  oriented, alert, coherent, logical and thought processes intact    IV Access:  - None    Nursing Mobility/ADLs:  Walking   Independent  Transfer  Independent  Bathing  Independent  Dressing  Independent  Toileting  Independent  Feeding  Independent  Med Admin  Independent  Med Delivery   whole    Wound Care Documentation and Therapy:        Elimination:  Continence:   · Bowel: Yes  · Bladder: Yes  Urinary Catheter: None   Colostomy/Ileostomy/Ileal Conduit: No       Date of Last BM: 6/3  No intake or output data in the 24 hours ending 05/22/19 1336  No intake/output data recorded.     Safety Concerns: None    Impairments/Disabilities:      None    Nutrition Therapy:  Current Nutrition Therapy:   - Oral Diet:  General    Routes of Feeding: Oral  Liquids: Thin Liquids  Daily Fluid Restriction: no  Last Modified Barium Swallow with Video (Video Swallowing Test): not done    Treatments at the Time of Hospital Discharge:   Respiratory Treatments: none  Oxygen Therapy:  is not on home oxygen therapy. Ventilator:    - No ventilator support    Rehab Therapies: Physical Therapy and Occupational Therapy  Weight Bearing Status/Restrictions: No weight bearing restirctions  Other Medical Equipment (for information only, NOT a DME order):  wheelchair and walker  Other Treatments: Skilled nursing assessment per protocol and system specific. Medication education and monitoring. Home health care agency's  to evaluate patient two weeks prior to discharge from home health to determine post-discharge needs. Patient's personal belongings (please select all that are sent with patient):  None    RN SIGNATURE:  {[id number]}    CASE MANAGEMENT/SOCIAL WORK SECTION    Inpatient Status Date: ***    Readmission Risk Assessment Score:  Readmission Risk              Risk of Unplanned Readmission:        17           Discharging to Facility/ [address]  Phone: [phone]  Fax [facility]  Phone [phone]  Fax [phone]  [facility]    Dialysis Facility (if applicable)   · Name:  · Address:  · Dialysis Schedule:  · Phone:  · Fax:    / signature: Electronically signed by MARYAM Stein LSW on 6/4/19 at 1:09 PM    PHYSICIAN SECTION    Prognosis: Fair    Condition at Discharge: Stable    Rehab Potential (if transferring to Rehab):  Fair    Recommended Labs or Other Treatments After Discharge: Home PT/OT, visiting nurse    Physician Certification: I certify the above information and transfer of Rodriguez Adams  is necessary

## 2019-05-22 NOTE — CARE COORDINATION
CASE MANAGEMENT NOTE:    Admission Date:  5/21/2019 Lucila Correa is a 80 y.o.  female    Admitted for : History of hemiarthroplasty of right hip [Z96.641]  History of left hip hemiarthroplasty [Z96.642]  S/P right hip fracture [Z87.81]    Met with:  son    PCP: dr Thakkar Abts:  Medicare/mmo      Current Residence/ Living Arrangements:  independently at home with her  he will be home 24/7             Current Services PTA:  No    Is patient agreeable to VNS: Yes    Freedom of choice provided: Yes    List of [address] provided: No    VNS chosen:  No    DME:  other reacher and rails on the toilet    Home Oxygen: No    Nebulizer: No    CPAP/BIPAP: No    Supplier: N/A    Potential Assistance Needed: Yes    SNF needed: No    Ph[facility]       [facility] Read[facility] Risk Score greater than 14%? Yes  If yes, pt needs a follow up appointment made within 7 days. 17%    Does Patient want to use MEDS to BEDS? Yes    Family Members/Caregivers that pt would like involved in their care:    Yes    If yes, list name here:  Darwin Schlatter    Transportation Provider:  Family             Is patient in Isolation/One on One/Altered Mental Status? No  If yes, skip next question. If no, would they like an I-Pad to  use? No  If yes, call [phone].              Discharge Plan:  Discharge home with vns,dme will be ordered as needed                 Electronically signed by: MARYAM Means, SUKHDEVW on 5/22/2019 at 12:42 PM

## 2019-05-22 NOTE — CARE COORDINATION
Pt completed the burns depression scale and scored a 4 which places the pt in the minimal or none depression category. There were no reports of suicidal/homicidal ideation or plans at the time of the assessment. there is no  history suicidal/homicidal attempts or thought. There is no history of mental health treatment. There is no history of drug or alcohol abuse.  There is no history of drug or alcohol treatment in the past.

## 2019-05-22 NOTE — PROGRESS NOTES
[address]  Acute Rehabilitation Physical Therapy Progress Note    Date: 19  Patient Name: Sharmaine Chris       Room: 5484/8694-25  MRN: [id number]   Account: [de-identified]   : 1935  (80 y.o.) Gender: female     Referring Practitioner: Robert Garcia / Milan Carmona / Michael Felty   (rehab)       Dr. Gila Romo (ortho)   Diagnosis: S/P R hip hemiarthroplasty 19  Past Medical History:  has a past medical history of Allergic rhinitis, Anemia, Anxiety, Asthma, Cataract, COPD (chronic obstructive pulmonary disease) ([address] CTS (carpal tunnel syndrome), Esophageal reflux, Headache(784.0), Hyperlipidemia, Hypertension, Hypothyroidism, Osteoarthritis, Osteoporosis, and Rheumatoid arthritis(714.0). Past Surgical History:   has a past surgical history that includes Rotator cuff repair (Left); Tonsillectomy and adenoidectomy; Hysterectomy, vaginal; Foot surgery; Hemorrhoid surgery; Dilation and curettage of uterus; Cataract removal with implant (Bilateral); Inguinal hernia repair; Cystocele repair; joint replacement (Left); other surgical history; Hammer toe surgery; other surgical history; and HEMIARTHROPLASTY HIP (Right, 2019). Additional Pertinent Hx: RA, advanced age, R hemiarthroplasty 19    Overall Orientation Status: Within Functional Limits  Restrictions/Precautions  Restrictions/Precautions: Fall Risk;Weight Bearing  Required Braces or Orthoses?: No  Implants present? : (R hip hemiarthroplasty 19; L MARYANN)  Lower Extremity Weight Bearing Restrictions  Right Lower Extremity Weight Bearing: Weight Bearing As Tolerated  Position Activity Restriction  Other position/activity restrictions: Posterolateral approach - no hip flexion greater than 90 degrees, no hip adduction across midline, no hip hyperextension, and no external rotation. Subjective: Pt reports not having difficulty amb \"just a little slow\"  Comments: Pt is very talkative and needs frequent redirection.  Pt is very difficult to

## 2019-05-22 NOTE — PROGRESS NOTES
19 1448   OT Individual Minutes   Time In 1406   Time Out [id number] KEVIN Harris [facility]   ACUTE REHABILITATION OCCUPATIONAL THERAPY  DAILY NOTE    Date: 19  Patient Name: Rocky Spear      Room: 2835/1582-96    MRN: [id number]   : 1935  (80 y.o.)  Gender: female   Referring Practitioner: Robert Vivas / Yamil Almaraz / June Allen   (rehab)       Dr. 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 : (R hip hemiarthroplasty 19; L MARYANN)  Hip Precautions: No hip flexion > 90 degrees, No ADduction, No hip extension, No hip external rotation  Other position/activity restrictions: Posterolateral approach - no hip flexion greater than 90 degrees, no hip adduction across midline, no hip hyperextension, and no external rotation. Right Lower Extremity Weight Bearing: Weight Bearing As Tolerated(Pb-arthroplasty )  Required Braces or Orthoses?: No    Subjective \"so with my rheumatoid arthritis I have to warm up before I actually move . ... And on and on. Suezanne Sandee Suezanne Sandee Suezanne Sandee \"  Patient Currently in Pain: Yes  Pain Level: 6  Pain Location: Hip  Pain Orientation: Right           Pain Assessment  Pain Level: 6  Pain Type: Acute pain, Surgical pain  Pain Location: Hip  Pain Orientation: Right    Objective   Pt needs redirected to keep mind on task. Therapist demonstrated reacher, sock aid with patient successfully doff and don sock. Discussed easiest clothing to wear and easiest way to dress. Wheeled stepstool placed with good understanding of the importance in moving affected leg to decrease pain throughout the day with prolonged sitting. Reiterated that transfers must be done with staff. Had ice on hip when therapist entered room. Son present during tx.   Continue POC      Transfers  Sit to stand: Minimal assistance  Stand to sit: Modified independent     Toilet Transfers  Toilet - Technique: Ambulating  Toilet Transfer: Minimal assistance   used r/w                                      OT FIM:                   Assessment  Performance deficits / Impairments: Decreased functional mobility ; Decreased ADL status; Decreased ROM; Decreased strength;Decreased safe awareness;Decreased cognition;Decreased endurance;Decreased balance;Decreased high-level IADLs;Decreased coordination  Prognosis: Fair  Activity Tolerance: Patient Tolerated treatment well  Safety Devices in place: Yes  Type of devices: Gait belt;Patient at risk for falls;Call light within reach;Nurse notified(left on toilet, aide notified )          Patient Education:  Patient Goals   Patient goals : To Return Home able to care for herself      Learner:patient,family   Method: explanation       Outcome: needs reinforcement, understands      Plan  Plan  Times per week: 900/7   Times per day: Twice a day  Current Treatment Recommendations: Self-Care / ADL, Balance Training, Functional Mobility Training, Endurance Training, Safety Education & Training, Patient/Caregiver Education & Training, Positioning, Strengthening, ROM, Cognitive Reorientation, Home Management Training  Plan Comment: continue OT  Patient Goals   Patient goals : To Return Home able to care for herself   Short term goals  Time Frame for Short term goals: 1 week   Short term goal 1: Pt will V/D Good understanding of AE/DME/modified techniques for increased IND with self-care and mobility. Short term goal 2: Pt will actively participate in 10+ minutes of self-care to the best of Pt's ability to promote increased IND with self-care. Short term goal 3: Pt will perform bed mobility with Min A to sit EOB for increased participation with self-care. Short term goal 4: Pt will perform sit to stand transfer with Min A with RW with Good safety awareness.   Short term goal 5: Pt will actively

## 2019-05-22 NOTE — PLAN OF CARE
Problem: Pain:  Goal: Pain level will decrease  Description  Pain level will decrease  Outcome: Ongoing   Pain controlled with prn and ATC pain medications this shift. Safety maintained this shift. Will continue to monitor. Problem: Risk for Impaired Skin Integrity  Goal: Tissue integrity - skin and mucous membranes  Description  Structural intactness and normal physiological function of skin and  mucous membranes. Outcome: Ongoing   Zero obvious new skin issues so far this shift. Safety maintained this shift. Will continue to monitor. Problem: Falls - Risk of:  Goal: Will remain free from falls  Description  Will remain free from falls  Outcome: Ongoing   Patient remains free from falls/injuries at this time. Call light within reach. Safety maintained this shift. Will continue to monitor.

## 2019-05-22 NOTE — PROGRESS NOTES
Physical Therapy  [place]      Date: 19  Patient Name: Harriett Ward       Room: 1197/8541-28  MRN: [id number]  Account: [de-identified]   : 1935  (80 y.o.) Gender: female         Basic Mobility [address] Score Conversion Table   How much difficulty does the patient currently have Unable   (pt is unable to do activity) A Lot   (activity is a struggle, requires great effort/time) A Little   (pt can manage, but takes more effort/time than should) None   (pt has no difficulty) Raw Score Standardized Score CMS -100% Score CMS Modifier        6 23.55 100% CN   Turning over in bed (including adjusting bedclothes, sheets, and blankets)? []1 []2  [x]3  []4  7 26.42 92.36% CM        8 28.58 86.62% CM   Sitting down on and standing up from a chair with arms (e.g. wheelchair, bedside commode, etc.)? []1 []2 [x]3   []4   9 30.55 81.38% CM        10 32.29 76.75% CL   Moving from lying on back to sitting on the side of the bed? []1 []2  [x]3   []4   11 33.86 72.57% CL        12 35.33 68.66% CL   How much help from another person does the patient currently need Total   (Total/Dependent Assist) A Lot   (Max/Mod Assist) A Little   (Min/CGA/Supervision) None   (No human assistance) 13 36.74 64.91% CL        14 38.1 61.29% CL   Moving to and from a bed to a chair (including a wheelchair)? []1  [x]2   []3  []4   15 39.45 57.70% CK        16 40.78 54.16% CK   To walk in a hospital room? []1 []2   [x]3    []4  17 42.13 50.57% CK        18 43.63 46.58% CK   Climbing 3-5 steps with a railing?  []1  [x]2   []3    []4  19 45.44 41.77% CK        20 47.67 35.83% CJ   Raw Score 16  21 50.25 28.97% CJ   Standardized Score   22 53.28 20.91% CJ   CMS 0-100% Score 54.16  23 56.93 11.20% CI   CMS Modifier CK 24 61.14 0.00% CH     CH = 0% impaired  CI = 1-20% impaired  CJ = 20-40% impaired  CK = 40-60% impaired  CL = 60-80% impaired  CM = % impaired  CN = 100% impaired  Electronically signed by Jeanne Glynn PT on 5/22/2019 at 12:55 PM

## 2019-05-22 NOTE — FLOWSHEET NOTE
Patient's family just left for dinner. Patient talked about working hard in therapy, patient stated she had to have a nap at noon. Patient is very talkative and she likes to be heard. Patient has good family support.  was grateful to be a listening presence. 05/22/19 1800   Encounter Summary   Services provided to: Patient   Referral/Consult From: [email or web address]   Support System Spouse; Children;Family members;Friends/neighbors   Continue Visiting   (5/22/19)   Complexity of Encounter Low   Length of Encounter 15 minutes   Spiritual Assessment Completed Yes   Routine   Type Follow up   Assessment Calm; Approachable   Intervention Active listening;Explored feelings, thoughts, concerns;Nurtured hope;Sustaining presence/ Ministry of presence; Discussed illness/injury and it's impact   Outcome Expressed gratitude;Engaged in conversation;Expressed feelings/needs/concerns;Receptive     Visited by Rafael Lange

## 2019-05-22 NOTE — PROGRESS NOTES
Physical Therapy     05/21/19 1016   Restrictions/Precautions   Restrictions/Precautions Fall Risk;Weight Bearing   Required Braces or Orthoses? No   Lower Extremity Weight Bearing Restrictions   Right Lower Extremity Weight Bearing Weight Bearing As Tolerated   Position Activity Restriction   Other position/activity restrictions Posterolateral approach - no hip flexion greater than 90 degrees, no hip adduction across midline, no hip hyperextension, and no external rotation. General   Family / Caregiver Present Yes   Subjective   Subjective Pt is sitting on EOB upon arrival. Pt's son assisted her to EOB. Per pt's son, pt was able to complete tasks \"on her own\". General Comment   Comments RN No Lynne ok's pt for PT. Pain Screening   Patient Currently in Pain Yes   Pain Assessment   Pain Level 3  (\"not really in any pain\". \")   Pain Assessment 0-10   Oxygen Therapy   O2 Device None (Room air)   Patient Observation   Observations room air   Orientation   Overall Orientation Status WFL   Bed Mobility   Sit to Supine Moderate assistance   Scooting Contact guard assistance   Comment Pt demos slow movements to complete. Pt's son reports pt got EOB \"on her own\" while he was in room. Transfers   Sit to Stand Minimal Assistance   Stand to sit Minimal Assistance   Comment Pt requires vc's for safe hand placement. Fair +carryover. Ambulation   Ambulation? Yes   More Ambulation? Yes   Ambulation 1   Surface level tile   Device Rolling Walker   Assistance Minimal assistance   Quality of Gait PT demos decrease reece, decrease step length, forward flexed posture, and downward gaze. Pt requires vc's for technique. Distance 5'x2   Ambulation 2   Surface - 2 level tile   Device 2 Rolling Walker   Assistance 2 Minimal assistance   Quality of Gait 2 same as above. Pt requires increase time for proper technique with turns.     Distance 30'x2    Balance   Posture Fair   Sitting - Static Good   Sitting - Dynamic Fair Standing - Static Fair;+   Standing - Dynamic Fair;+   Comments standing balance assessed with RW   Other exercises   Other exercises 4 Seated BLE ex's maintain all precautions. AROM. Reps: 10 each.,   Other exercises 5 standing pre gait activities. BUE support on RW. Pt requires CGA for safety. Other exercises 6 static stand x~5-7 min. Other exercises 8 standing functional dynamic balance activities. Pt requires no UE to unilateral UE support for balance. Other exercises 9 Seated dynamic balance activity. no LOB noted. Other exercises 10 Educated pt and pt's son on Supine/seated ex's to complete later after dinner to increase strength and endurance. Pt's son verbalizes good understanding. Other exercises 11 Pt educated on the importance of functional mobility. Other Activities   Comment Max vc's to stay on tasks. Pt demos increase agitiation with vc's to stay on tasks. Patient Goals    Patient goals  less pain   Short term goals   Time Frame for Short term goals 2-3 days   Short term goal 1 bed mobility with modA of 2   Short term goal 2 transfers with modA of 2   Short term goal 3 standing with RW/WBAT and Diego of 2 x 2-3min   Short term goal 4 ambulate with min/CGA with RW/WBAT x 25ft   Conditions Requiring Skilled Therapeutic Intervention   Body structures, Functions, Activity limitations Decreased functional mobility ; Increased Pain   Assessment Impaired mobility due to L Hemiarthroplasty secondary to Hip Fx. Pt would benefit from additional PT to increase strength, endurance, and independence with functional mobility. Patient Education POC   REQUIRES PT FOLLOW UP Yes   Discharge Recommendations Subacute/Skilled Nursing Facility;IP Rehab   Other Comments   Comments easily distracted- vc's to stay on tasks. Fixates on pain. Plan   Times per week BID   Times per day Twice a day   Current Treatment Recommendations Functional Mobility Training;Transfer Training;Gait Training; Safety Education & Training   Safety Devices   Type of devices Left in bed;Patient at risk for falls;Call light within reach   PT Whiteboard Notes   Therapy Whiteboard 5/21/19 bed: Min A, Transfers: Min Ax1, and Amb: Min A x1, SNF/IPrehab.     Electronically signed by Lou Mcdowell PTA on 5/22/2019 at 10:36 AM  Time in: 3466/1928  Time out: 9378/1606  Total time: 75 min

## 2019-05-23 LAB
ANION GAP SERPL CALCULATED.3IONS-SCNC: 11 MMOL/L (ref 9–17)
BUN BLDV-MCNC: 17 MG/DL (ref 8–23)
BUN/CREAT BLD: ABNORMAL (ref 9–20)
CALCIUM SERPL-MCNC: 8.7 MG/DL (ref 8.6–10.4)
CHLORIDE BLD-SCNC: 100 MMOL/L (ref 98–107)
CO2: 25 MMOL/L (ref 20–31)
CREAT SERPL-MCNC: <0.4 MG/DL (ref 0.5–0.9)
GFR AFRICAN AMERICAN: ABNORMAL ML/MIN
GFR NON-AFRICAN AMERICAN: ABNORMAL ML/MIN
GFR SERPL CREATININE-BSD FRML MDRD: ABNORMAL ML/MIN/{1.73_M2}
GFR SERPL CREATININE-BSD FRML MDRD: ABNORMAL ML/MIN/{1.73_M2}
GLUCOSE BLD-MCNC: 137 MG/DL (ref 70–99)
HCT VFR BLD CALC: 31.1 % (ref 36–46)
HEMOGLOBIN: 10.1 G/DL (ref 12–16)
MCH RBC QN AUTO: 27.3 PG (ref 26–34)
MCHC RBC AUTO-ENTMCNC: 32.4 G/DL (ref 31–37)
MCV RBC AUTO: 84.1 FL (ref 80–100)
NRBC AUTOMATED: ABNORMAL PER 100 WBC
PDW BLD-RTO: 16.6 % (ref 11.5–14.9)
PLATELET # BLD: 334 K/UL (ref 150–450)
PMV BLD AUTO: 7.1 FL (ref 6–12)
POTASSIUM SERPL-SCNC: 3.8 MMOL/L (ref 3.7–5.3)
RBC # BLD: 3.7 M/UL (ref 4–5.2)
SODIUM BLD-SCNC: 136 MMOL/L (ref 135–144)
WBC # BLD: 8.1 K/UL (ref 3.5–11)

## 2019-05-23 PROCEDURE — 97110 THERAPEUTIC EXERCISES: CPT

## 2019-05-23 PROCEDURE — 99231 SBSQ HOSP IP/OBS SF/LOW 25: CPT | Performed by: INTERNAL MEDICINE

## 2019-05-23 PROCEDURE — 99232 SBSQ HOSP IP/OBS MODERATE 35: CPT | Performed by: PHYSICAL MEDICINE & REHABILITATION

## 2019-05-23 PROCEDURE — 80048 BASIC METABOLIC PNL TOTAL CA: CPT

## 2019-05-23 PROCEDURE — 94761 N-INVAS EAR/PLS OXIMETRY MLT: CPT

## 2019-05-23 PROCEDURE — 6370000000 HC RX 637 (ALT 250 FOR IP): Performed by: PHYSICAL MEDICINE & REHABILITATION

## 2019-05-23 PROCEDURE — 97116 GAIT TRAINING THERAPY: CPT

## 2019-05-23 PROCEDURE — 94760 N-INVAS EAR/PLS OXIMETRY 1: CPT

## 2019-05-23 PROCEDURE — 94640 AIRWAY INHALATION TREATMENT: CPT

## 2019-05-23 PROCEDURE — 85027 COMPLETE CBC AUTOMATED: CPT

## 2019-05-23 PROCEDURE — 6370000000 HC RX 637 (ALT 250 FOR IP): Performed by: ORTHOPAEDIC SURGERY

## 2019-05-23 PROCEDURE — 1180000000 HC REHAB R&B

## 2019-05-23 PROCEDURE — 36415 COLL VENOUS BLD VENIPUNCTURE: CPT

## 2019-05-23 PROCEDURE — 97535 SELF CARE MNGMENT TRAINING: CPT

## 2019-05-23 RX ADMIN — LEVOTHYROXINE SODIUM 50 MCG: 50 TABLET ORAL at 06:09

## 2019-05-23 RX ADMIN — LOSARTAN POTASSIUM 25 MG: 25 TABLET ORAL at 07:36

## 2019-05-23 RX ADMIN — OXYCODONE HYDROCHLORIDE AND ACETAMINOPHEN 1 TABLET: 5; 325 TABLET ORAL at 07:36

## 2019-05-23 RX ADMIN — LORAZEPAM 0.5 MG: 0.5 TABLET ORAL at 17:57

## 2019-05-23 RX ADMIN — PREDNISONE 10 MG: 10 TABLET ORAL at 07:41

## 2019-05-23 RX ADMIN — OXYCODONE HYDROCHLORIDE AND ACETAMINOPHEN 1 TABLET: 5; 325 TABLET ORAL at 17:06

## 2019-05-23 RX ADMIN — SENNOSIDES,DOCUSATE SODIUM 1 TABLET: 8.6; 5 TABLET, FILM COATED ORAL at 07:37

## 2019-05-23 RX ADMIN — OXYCODONE HYDROCHLORIDE AND ACETAMINOPHEN 1 TABLET: 5; 325 TABLET ORAL at 12:21

## 2019-05-23 RX ADMIN — IPRATROPIUM BROMIDE AND ALBUTEROL SULFATE 1 AMPULE: .5; 3 SOLUTION RESPIRATORY (INHALATION) at 21:23

## 2019-05-23 RX ADMIN — METOPROLOL TARTRATE 50 MG: 50 TABLET ORAL at 22:39

## 2019-05-23 RX ADMIN — LORAZEPAM 0.5 MG: 0.5 TABLET ORAL at 00:55

## 2019-05-23 RX ADMIN — PANTOPRAZOLE SODIUM 40 MG: 40 TABLET, DELAYED RELEASE ORAL at 06:09

## 2019-05-23 RX ADMIN — OXYCODONE HYDROCHLORIDE AND ACETAMINOPHEN 1 TABLET: 5; 325 TABLET ORAL at 23:20

## 2019-05-23 RX ADMIN — LORAZEPAM 0.5 MG: 0.5 TABLET ORAL at 22:40

## 2019-05-23 RX ADMIN — RIVAROXABAN 10 MG: 10 TABLET, FILM COATED ORAL at 18:47

## 2019-05-23 RX ADMIN — OXYCODONE HYDROCHLORIDE AND ACETAMINOPHEN 1 TABLET: 5; 325 TABLET ORAL at 02:58

## 2019-05-23 RX ADMIN — METOPROLOL TARTRATE 50 MG: 50 TABLET ORAL at 07:41

## 2019-05-23 RX ADMIN — IPRATROPIUM BROMIDE AND ALBUTEROL SULFATE 1 AMPULE: .5; 3 SOLUTION RESPIRATORY (INHALATION) at 16:08

## 2019-05-23 ASSESSMENT — PAIN SCALES - GENERAL
PAINLEVEL_OUTOF10: 5
PAINLEVEL_OUTOF10: 7
PAINLEVEL_OUTOF10: 5
PAINLEVEL_OUTOF10: 1
PAINLEVEL_OUTOF10: 7
PAINLEVEL_OUTOF10: 6
PAINLEVEL_OUTOF10: 5
PAINLEVEL_OUTOF10: 5
PAINLEVEL_OUTOF10: 6
PAINLEVEL_OUTOF10: 5
PAINLEVEL_OUTOF10: 7

## 2019-05-23 ASSESSMENT — PAIN DESCRIPTION - FREQUENCY: FREQUENCY: CONTINUOUS

## 2019-05-23 ASSESSMENT — PAIN DESCRIPTION - ORIENTATION
ORIENTATION: RIGHT
ORIENTATION: RIGHT

## 2019-05-23 ASSESSMENT — PAIN DESCRIPTION - PAIN TYPE
TYPE: SURGICAL PAIN
TYPE: SURGICAL PAIN

## 2019-05-23 ASSESSMENT — PAIN DESCRIPTION - LOCATION
LOCATION: HIP
LOCATION: HIP;KNEE

## 2019-05-23 ASSESSMENT — PAIN DESCRIPTION - DESCRIPTORS
DESCRIPTORS: ACHING;DISCOMFORT
DESCRIPTORS: ACHING;DISCOMFORT

## 2019-05-23 NOTE — PROGRESS NOTES
equal  NEURO: A&O x3. Sensation intact to light touch. MSK: PROM within functional limits. UE, LLE, limited R prox, RA changes of hands, . EXTREMITIES: No calf tenderness to palpation bilaterally. No edema BLEs  SKIN: warm dry and intact with good turgor, right hip dressing saturated distally, nursing to change  PSYCH: appropriately interactive. Affect WNL. Good spirits    Medications   Scheduled Meds:   sennosides-docusate sodium  1 tablet Oral BID    lidocaine  1 patch Transdermal Daily    rivaroxaban  10 mg Oral Daily    ipratropium-albuterol  1 ampule Inhalation Q4H WA    levothyroxine  50 mcg Oral Daily    lidocaine  1 patch Transdermal Daily    losartan  25 mg Oral Daily    metoprolol tartrate  50 mg Oral BID    pantoprazole  40 mg Oral QAM AC    polyethylene glycol  17 g Oral Daily    predniSONE  10 mg Oral Daily     Continuous Infusions:  PRN Meds:.aluminum & magnesium hydroxide-simethicone, bisacodyl, magnesium hydroxide, acetaminophen, LORazepam, oxyCODONE-acetaminophen     Diagnostics:     CBC:   Recent Labs     05/23/19  0622   WBC 8.1   RBC 3.70*   HGB 10.1*   HCT 31.1*   MCV 84.1   RDW 16.6*        BMP:   Recent Labs     05/23/19  0622      K 3.8      CO2 25   BUN 17   CREATININE <0.40*     BNP: No results for input(s): BNP in the last 72 hours. PT/INR: No results for input(s): PROTIME, INR in the last 72 hours. APTT: No results for input(s): APTT in the last 72 hours. CARDIAC ENZYMES: No results for input(s): CKMB, CKMBINDEX, TROPONINT in the last 72 hours. Invalid input(s): CKTOTAL;3  FASTING LIPID PANEL:No results found for: CHOL, HDL, TRIG  LIVER PROFILE: No results for input(s): AST, ALT, ALB, BILIDIR, BILITOT, ALKPHOS in the last 72 hours. I/O (24Hr): No intake or output data in the 24 hours ending 05/23/19 1730    Glu last 24 hour  No results for input(s): POCGLU in the last 72 hours.     No results for input(s): CLARITYU, COLORU, [address] [address], Erika Bowen, BLOODU, BACTERIA, NITRU, WBCUA, LEUKOCYTESUR, YEAST, GLUCOSEU, BILIRUBINUR in the last 72 hours. Impression/Plan:    1. Mobility and ADL dysfunction secondary to right subcapitalfemur fracture with hemiarthroplasty-continue rehab efforts  2. Right subcapital femur fracture with hemiarthroplasty-monitor incision, change dressing due to saturated distal aspect   3. Rheumatoid arthritis- prednisone  4. Postop anemia-monitor mproving  5. Mildly elevated glucose-monitor, possibly due to steroids, fasting 137-defer to internal medicine  6. Hypertension-Cozaar and Lopressor  7. Anxiety- Ativan  8. Pain-Percocet/Lidoderm patch  9. Hypothyroid-Synthroid  10. Pulmonary-methotrexate long-DuoNeb  11. GI/history hemorrhoids-Protonix, MiraLAX, Senokot S  12. DVT prophylaxis-Xarelto  13. Internal medicine for medical management          Xena Plasencia. Jocelynn Rowland MD       This note is created with the assistance of a speech recognition program.  While intending to generate a document that actually reflects the content of the visit, the document can still have some errors including those of syntax and sound a like substitutions which may escape proof reading.   In such instances, actual meaning can be extrapolated by contextual diversion

## 2019-05-23 NOTE — PROGRESS NOTES
of treatment time of 0730, pt is still sitting on toilet at 0800 but willing to get up at this time. Pt is very talkative and needs frequent redirection. Pt is very difficult to redirect until pt's entire thought is finished. Vital Signs  Patient Currently in Pain: Denies                   Bed Mobility:   Bed Mobility  Bridging: Stand by assistance(per 5/18 note)  Rolling: Stand by assistance(per 5/19 note)  Supine to Sit: Minimal assistance(per 5/20 note)  Sit to Supine: Moderate assistance(assist bilat LE)  Scooting: Contact guard assistance(per 5/21 note)  Bed mobility  Bridging: Stand by assistance(per 5/18 note)  Scooting: Contact guard assistance(per 5/21 note)    Transfers:  Sit to Stand: Minimal Assistance  Stand to sit: Minimal Assistance  Bed to Chair: Minimal assistance  Stand Pivot Transfers: Minimal Assistance(extra time, if pt isn't pleased, pt will \"redo it\")        WB Status: WBAT R  Ambulation 1  Surface: level tile  Device: Rolling Walker  Assistance: Contact guard assistance  Quality of Gait: very short shuffle steps, kyphotic posture  Gait Deviations: Decreased step length;Decreased step height;Slow Reece;Decreased arm swing;Decreased head and trunk rotation; Shuffles  Distance: 75ftx2  Comments: very slow reece with frequent stops, frequent redirecting as pt talks the whole time  Ambulation 2  Surface - 2: level tile  Device 2: Rolling Walker  Assistance 2: Minimal assistance  Quality of Gait 2: narrow DWAINE, short step length, shuffle step, forward flexed kyphotic posture, head and gaze downward, drifting left with RW  Distance: [id number]  Comments: seatd rest break in common room, vc's for upright posture and increased heel/toe gait     Stairs/Curb  Stairs?: No                                                       FIMS:      TRANSFERS  Bed, Chair, Wheel Chair: 3 - Requires 25-49% assistance to transfer  Toilet Transfer: 4 - Requires steadying assistance only < 25% assist LOCOMOTION  Primary Mode: Both  Distance Walked: 75ftx2, [id number]  Walk: 2 - Maximal Assistance Requires up to Maximal Assistance AND requires assistance of one person to walk between  feet (Patient performs 25-49% of locomotion effort or goes between  feet)  Stairs: 0 - Activity Does not Occur ( 0 only for the admission assessment)(pt not able today with current shoes,  to bring new)       BALANCE Posture: Poor(kyphotic)  Sitting - Static: Good  Sitting - Dynamic: Good  Standing - Static: Fair;+  Standing - Dynamic: Fair    EXERCISES    Other exercises 2: AA hip ab/ad seated  Other exercises 3: AA hip flex R/active L x 10  Other exercises 4: Seated BLE ex's maintain all precautions. AROM. Reps: 10 each.,  Other exercises 6: Static Standing 20 min. Other exercises 8: Standing Dynamic Reaching OOBOS without bilat UE support           Activity Tolerance: Patient Tolerated treatment well  PT Equipment Recommendations  Equipment Needed: No  Other Comments  Comments: easily distracted- vc's to stay on tasks    Patient Education  New Education Provided: Scheduled therapy time  Learner:patient  Method: demonstration and explanation       Outcome: needs reinforcement     Current Treatment Recommendations: Strengthening, ROM, Balance Training, Transfer Training, Gait Training, Stair training, Neuromuscular Re-education, Functional Mobility Training, Home Exercise Program, Safety Education & Training, Patient/Caregiver Education & Training, Wheelchair Mobility Training, Equipment Evaluation, Education, & procurement    Conditions Requiring Skilled Therapeutic Intervention  Body structures, Functions, Activity limitations: Decreased functional mobility ; Decreased ADL status; Decreased ROM; Decreased strength  Assessment: Impaired mobility due to L Hemiarthroplasty secondary to Hip Fx. Pt would benefit from additional PT to increase strength, endurance, and independence with functional mobility.    Treatment Diagnosis: difficulty walking R26.2  Prognosis: Good  Patient Education: Scheduled therapy time  Barriers to Learning: talkative  REQUIRES PT FOLLOW UP: Yes  Discharge Recommendations: Home with assist PRN;Home with Home health PT    Goals  Short term goals  Time Frame for Short term goals: by 1 week  Short term goal 1: supervision bed mobility  Short term goal 2: transfers Min A from multiple surfaces  Short term goal 3: Ambulate 30 ft with RW in < 3 min   Short term goal 4: Amb up/down 5 stairs el HR mod A x 1  Short term goal 5: min A WC mobility 25 ft using BLE.   Long term goals  Time Frame for Long term goals : by discharge  Long term goal 1: Bed mobility Mod I  Long term goal 2: Transfers Mod I multiple surfaces  Long term goal 3: Ambulate 50-60 ft MI with RW  Long term goal 4: Amb up/down 5 steps el HR min A x 1  Long term goal 5: pt will be indep with wc mobility 50 ft       05/23/19 0800 05/23/19 1515   PT Individual Minutes   Time In 0800 1515   Time Out 0830 1610   Minutes 30 55       Electronically signed by Adriana Rubio PTA on 5/23/19 at 4:22 PM

## 2019-05-23 NOTE — PROGRESS NOTES
[address] [place] [address] [address]   ACUTE REHABILITATION OCCUPATIONAL THERAPY  DAILY NOTE    Date: 19  Patient Name: Jeremy Garner      Room: 7724/1738-95    MRN: [id number]   : 1935  (80 y.o.)  Gender: female   Referring Practitioner: Robert Coe / Stephanie Bailon / Ned Barker   (rehab)       Dr. 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 : (Rt hip hemiarthroplasty 19, L MARYANN)  Hip Precautions: No hip flexion > 90 degrees, No ADduction, No hip extension, No hip external rotation  Other position/activity restrictions: Posterolateral approach - no hip flexion greater than 90 degrees, no hip adduction across midline, no hip hyperextension, and no external rotation. Right Lower Extremity Weight Bearing: Weight Bearing As Tolerated  Required Braces or Orthoses?: No    Subjective  Subjective: Pt states pain from past hernia problem is doing better. Patient Currently in Pain: Yes  Pain Level: 5  Pain Location: Hip;Knee  Pain Orientation: Right  Restrictions/Precautions: Fall Risk;Weight Bearing  Orientation Level: Oriented to person;Oriented to situation;Oriented to place     Pain Assessment  Pain Assessment: 0-10  Pain Level: 5  Patient's Stated Pain Goal: No pain  Pain Type: Surgical pain  Pain Location: Hip, Knee  Pain Orientation: Right  Pain Descriptors: Aching, Discomfort  Pain Frequency: Continuous    Objective     Balance  Standing Balance: Minimal assistance(CGA to min assist)  Transfers  Sit to stand: Minimal assistance  Stand to sit: Contact guard assistance  Standing Balance  Activity: Pt stands at sink 10 minutes to brush her teeth at sink. Pt stands several times getting up/down from toilet. Pt stands w CGA/min assist when she completes ana care/bottom care. Pt stands for lower body clothing management to pull up underpants/pj bottoms. Pt stands during transfers. Functional Mobility  Functional - Mobility Device: Rolling Walker  Activity: To/from bathroom  Assist Level: Minimal assistance  Functional Mobility Comments: Verbal cues using RW to ambulate to bathroom. Pt ambulates using RW  to /from toilet w min assist.   Toilet Transfers  Toilet - Technique: Ambulating(Pt uses RW)  Equipment Used: (Rolling shower chair is on top of standard toilet.)  Toilet Transfer: Minimal assistance     Type of ROM/Therapeutic Exercise  Type of ROM/Therapeutic Exercise: Cane/Wand  Comment: 2# theracane bilateral UE; elbow flex/ext palm up 10x, elbow flex/ext palm down 12x, shoulder horizontal abd/adduction 10x,shoulder circles cw 10x & ccw 15x, shoulder flex/ext 11x, PNF diagonal to rt 10x, PNF diagonal to lt 10x. Exercises  Grasp/Release: Pt uses bilateral UE to place /remove yellow light resistance, red medium resistance, green moderate resistance clothespins on horizontal rods. OT FIM:   Eatin - Feeds self with setup/supervision/cues and/or requires only setup/supervision/cues to perform tube feedings(Pt needs assist to open containers)  Groomin - Able to perform 3-4 tasks with touching help(Pt stands w CGA while brushing her teeth for oral hygiene. Pt washes face/hands. Pt has perm & wants her hair to stay the way it is. )  Bathin - Able to bathe 8-9 areas(Pt sits to wash UB. Pt stands w CGA to wash ana/bottom w 1 hand holding onto walker. OT instructed pt in LHS to wash her legs - pt does this but need assist to wash bottom of her feet. )  Dressing-Upper: 4 - Requires assist with buttons/zippers only and/or requires assist with one arm only(Pt need min assist to pull her shirt down. )  Dressing-Lower: 3 - Requires assist with 2-3 parts of dressing(Pt wears footies, brief pullup underpants, cut off hospital pants. OT instructed pt in using reacher to doff footies and [facility] pants, sockaid to sarita footies w mod assist)  Toiletin - Requires steadying assistance only  Toilet Transfer: 4 - Requires steadying assistance only < 25% assist(Pt uses RW)  Shower Transfer: 0 - Activity does not occur(ADL done sinkside)    Social Interaction: 6 - Patient requires medication for mood and/or effect  Problem Solvin - Patient solves simple/routine tasks 75-90%+   Memory: 5 - Patient requires prompting with stress/unfamiliar situations    Assessment  Performance deficits / Impairments: Decreased functional mobility ; Decreased ADL status; Decreased ROM; Decreased strength;Decreased safe awareness;Decreased cognition;Decreased endurance;Decreased balance;Decreased high-level IADLs;Decreased coordination  Assessment: See FIM for ADLs. See OT exercises for bilateral UE strengthening. Worked on increasing standing tolerance during ADLs. Pt needs repetition w verbal cues during ADLs and transfers. OT able to redirect pt on task of selfcare. Prognosis: Fair;Good  Activity Tolerance: Patient Tolerated treatment well             Patient Education:  Patient Goals   Patient goals : To Return Home able to care for herself   Patient Education: OT reviewed hip precautions w pt for selfcare activities. Use of adaptive equipment. OT demonstrate use of reacher,LHsponge, sockaide then pt practiced. Pt needed assist w reacher placement on footie before being able to doff footies. OT needed to put footie on sockaide then pt had moderate difficulty & needed mod assist to sairta footie. Pt used reacher to [facility] pj bottoms w moderate assist .   Barriers to Learning: Pt needs repetition w verbal cues when using AE.   Learner:family(spouse) and patient  Method: demonstration and explanation       Outcome: needs reinforcement     Plan  Plan  Times per week: 900/7   Times per day: Twice a day  Current Treatment Recommendations: Self-Care / ADL, Balance Training, Functional Mobility Training, Endurance Training, Safety Education & Training, Patient/Caregiver Education & Training, Positioning, Strengthening, ROM, Cognitive Reorientation, Home Management Training  Plan Comment: continue OT  Patient Goals   Patient goals : To Return Home able to care for herself   Short term goals  Time Frame for Short term goals: 1 week   Short term goal 1: Pt will V/D Good understanding of AE/DME/modified techniques for increased IND with self-care and mobility. Short term goal 2: Pt will actively participate in 10+ minutes of self-care to the best of Pt's ability to promote increased IND with self-care. Short term goal 3: Pt will perform bed mobility with Min A to sit EOB for increased participation with self-care. Short term goal 4: Pt will perform sit to stand transfer with Min A with RW with Good safety awareness. Short term goal 5: Pt will actively participate in 10-15 minutes in therapeutic exercise/functional activities to promote increased IND with self-care and mobility.   Long term goals  Time Frame for Long term goals : By Discharge   Long term goal 1: D/V understanding of Joint protection strategies with application to care needs   Long term goal 2: Mod I for basic premorbid self care tasks    Long term goal 3: Tolerate standing for 10 minutes for self care and home management tasks   Timed Code Treatment Minutes: 117 Minutes     05/23/19 0858   OT Individual Minutes   Time In [facility]   Minutes 117   Time Code Minutes    Timed Code Treatment Minutes 117 Minutes     Electronically signed by Baron Torito OT on 5/23/19 at 4:18 PM

## 2019-05-23 NOTE — PROGRESS NOTES
26.3  Oxygen Therapy  SpO2: 95 %  Pulse Oximeter Device Mode: Intermittent  Pulse Oximeter Device Location: Finger  O2 Device: None (Room air)  Level of Consciousness: Alert    Subjective  Subjective: Pt reports sore rt hip. Multiple comments about meals. Pain Level: 5  Pain Location: Hip  Pain Orientation: Right  Orientation  Overall Orientation Status: Impaired  Orientation Level: Oriented to person, Oriented to situation, Oriented to place  Vision  Vision: Impaired  Vision Exceptions: Wears glasses at all times  Hearing  Hearing: Exceptions to [facility]  Hearing Exceptions: Hard of hearing/hearing concerns, No hearing aid  Social/Functional History  Lives With: Spouse(additional family support )  Type of Home: House  Home Layout: One level, Laundry in basement, Able to Live on Main level with bedroom/bathroom  Home Access: Stairs to enter without rails  Entrance Stairs - Number of Steps: 1 step to enter family room from outside; 11-12 steps to basement with B HR  Bathroom Shower/Tub: Walk-in shower, Tub/Shower unit, Shower chair with back(typically uses tub for shower )  Bathroom Toilet: Handicap height  Bathroom Equipment: Grab bars around toilet, Shower chair, Hand-held shower  Bathroom Accessibility: Accessible  Home Equipment: [employer], Grab bars, Cane, Quad cane  Receives Help From: Family  ADL Assistance: Independent  Homemaking Assistance: Needs assistance  Laundry: (Others carry laundry )  Homemaking Responsibilities: Yes  Ambulation Assistance: Independent(cane)  Transfer Assistance: Independent  Active : No  Patient's  Info: Pt's spouse  Mode of Transportation: Car, Family  Additional Comments: Son reports that her spouse and he can provide A PRN upon d/c.   Pain Assessment  Pain Assessment: 0-10  Pain Level: 5  Patient's Stated Pain Goal: No pain  Pain Type: Acute pain  Pain Location: Hip  Pain Orientation: Right    Objective      Cognition  Overall Cognitive Status: assistance      Activity Tolerance: Patient limited by fatigue, Treatment limited secondary to decreased cognition         FIM       Goals  Patient Goals   Patient goals : To Return Home able to care for herself   Short term goals  Time Frame for Short term goals: 1 week   Short term goal 1: Pt will V/D Good understanding of AE/DME/modified techniques for increased IND with self-care and mobility. Short term goal 2: Pt will actively participate in 10+ minutes of self-care to the best of Pt's ability to promote increased IND with self-care. Short term goal 3: Pt will perform bed mobility with Min A to sit EOB for increased participation with self-care. Short term goal 4: Pt will perform sit to stand transfer with Min A with RW with Good safety awareness. Short term goal 5: Pt will actively participate in 10-15 minutes in therapeutic exercise/functional activities to promote increased IND with self-care and mobility. Long term goals  Time Frame for Long term goals : By Discharge   Long term goal 1: D/V understanding of Joint protection strategies with application to care needs   Long term goal 2: Mod I for basic premorbid self care tasks    Long term goal 3: Tolerate standing for 10 minutes for self care and home management tasks     Assessment  Performance deficits / Impairments: Decreased functional mobility , Decreased ADL status, Decreased ROM, Decreased strength, Decreased safe awareness, Decreased cognition, Decreased endurance, Decreased balance, Decreased high-level IADLs, Decreased coordination  Treatment Diagnosis: Impaired self-care status.   Prognosis: Fair, Good  Decision Making: Medium Complexity  History: moderate chart review  Exam: 10 areas of impaired performance   Assistance / Modification: redirection to task  Patient Education: OT POC, Rehab Program, Standing tolerance during ADLs, hip precautions RA joint protection   Barriers to Learning: Needs redirection and repetition   REQUIRES OT FOLLOW UP: Yes  Discharge Recommendations: Home with assist PRN, Home with Home health OT, Home with nursing aide  Plan  Times per week: 900/7   Times per day: Twice a day  Current Treatment Recommendations: Self-Care / ADL, Balance Training, Functional Mobility Training, Endurance Training, Safety Education & Training, Patient/Caregiver Education & Training, Positioning, Strengthening, ROM, Cognitive Reorientation, Home Management Training       OT Individual Minutes  Time In: 8093  Time Out: 2523  Minutes: 60    Electronically signed by Tamanna Ovalles OT on 5/23/19 at 9:00 AM

## 2019-05-23 NOTE — PLAN OF CARE
.Individualized Plan of [facility]    Rehabilitation physician: Dr. Lian Wahl Date: 5/21/2019     Rehabilitation Diagnosis: History of hemiarthroplasty of right hip [Z96.641]  History of left hip hemiarthroplasty [B23.604]  S/P right hip fracture [Z87.81]     Rehabilitation impairments: self care, mobility, motor dysfunction, bowel/bladder management, pain management and safety    Factors facilitating achievement of predicted outcomes: Family support, Motivated, Cooperative and Good insight into deficits  Barriers to the achievement of predicted outcomes: Pain, Anxiety, Communication deficit, Lower extremity weakness and Long standing deficits    Patient Goals: Improve independence with mobility, Improvement of mobility at a wheelchair level, Increase overall strength and endurance, Increase balance, Increase endurance, Increase independence with activities of daily living, Improve cognition, Increase self-awareness, Increase safety awareness, Increase community integration, Increase socialization, Functional communication with caregivers, Integrate appropriate pain management plan, Assure adequate nutritional option for discharge, Continence of bowel and bladder and Provide appropriate patient and family education      NURSING:  Nursing goals for Harriett Ward while on the rehabilitation unit will include:  Continence of bowel and bladder, Adequate number of bowel movements, Urinate with no urinary retention >300ml in bladder, Complete bladder protocol with henry removal, Maintain O2 SATs at an acceptable level during stay, Effective pain management while on the rehabilitation unit, Establish adequate pain control plan for discharge, Absence of skin breakdown while on the rehabilitation unit, Improved skin integrity via assessments including wound measurements, Avoidance of any hospital acquired infections, No signs/symptoms of infection at the wound site, Freedom from injury during hospitalization and Complete education with patient/family with understanding demonstrated regarding disease process and resultant impairment     In order to achieve these goals, nursing interventions may include bowel/bladder training, education for medical assistive devices, medication education, O2 saturation management, energy conservation, stress management techniques, fall prevention, alarms protocol, seating and positioning, skin/wound care, pressure relief instruction, dressing changes, infection protection, DVT prophylaxis, assistance with safe transfers , and/or assistance with bathroom activities and hygiene. PHYSICAL THERAPY:  Goals:        Short term goals  Time Frame for Short term goals: by 1 week  Short term goal 1: supervision bed mobility  Short term goal 2: transfers Min A from multiple surfaces  Short term goal 3: Ambulate 30 ft with RW in < 3 min   Short term goal 4: Amb up/down 5 stairs el HR mod A x 1  Short term goal 5: min A WC mobility 25 ft using BLE. Long term goals  Time Frame for Long term goals : by discharge  Long term goal 1: Bed mobility Mod I  Long term goal 2: Transfers Mod I multiple surfaces  Long term goal 3: Ambulate 50-60 ft MI with RW  Long term goal 4: Amb up/down 5 steps el HR min A x 1  Long term goal 5: pt will be indep with wc mobility 50 ft    Plan of Care: Pt to be seen by physical therapy services 1 Hour 30 Minutes per day at least 5 out of 7 days per week     Anticipated interventions may include therapeutic exercises, gait training, neuromuscular re-ed, transfer training, community reintegration, bed mobility, w/c mobility and training. OCCUPATIONAL THERAPY:  Goals:             Short term goals  Time Frame for Short term goals: 1 week   Short term goal 1: Pt will V/D Good understanding of AE/DME/modified techniques for increased IND with self-care and mobility.   Short term goal 2: Pt will actively participate in 10+ minutes of self-care to the best of Pt's ability to promote increased IND with self-care. Short term goal 3: Pt will perform bed mobility with Min A to sit EOB for increased participation with self-care. Short term goal 4: Pt will perform sit to stand transfer with Min A with RW with Good safety awareness. Short term goal 5: Pt will actively participate in 10-15 minutes in therapeutic exercise/functional activities to promote increased IND with self-care and mobility. Long term goals  Time Frame for Long term goals : By Discharge   Long term goal 1: D/V understanding of Joint protection strategies with application to care needs   Long term goal 2: Mod I for basic premorbid self care tasks    Long term goal 3: Tolerate standing for 10 minutes for self care and home management tasks     Plan of Care: Patient to be seen by occupational therapy services 1 Hour 30 Minutes per day at least 5 out of 7 days per week     Anticipated interventions may include ADL and IADL retraining, strengthening, safety education and training, patient/caregiver education and training, equipment evaluation/ training/procurement, neuromuscular reeducation, wheelchair mobility training. SPEECH THERAPY:   Goals:  N/A    CASE MANAGEMENT:  Goals:   Assist patient/family with discharge planning, patient/family counseling,  and coordination with insurance during the inpatient rehabilitation stay. Other members of the multidisciplinary rehabilitation team that will be involved in the patient's plan of care include recreational therapy, dietary, respiratory therapy, and neuropsychology.     Medical issues being managed closely and that require 24 hour availability of a physician:  Bowel/Bladder function, Weight bearing precautions, Wound care, Pain management, Infection protection, DVT prophylaxis, Fall precautions, Fluid/Electrolyte balance, Nutritional status, Respiratory needs, Anemia and History of heart disease Physician anticipated functional outcomes: Improved independence with functional measures   Estimated length of stay for this admission 12 days  Medical Prognosis: Good  Anticipated disposition: Home with [facility]. The potential to achieve the above medical and rehabilitative goals is very good. This plan of care has been developed with the assistance and input of the multidisciplinary rehabilitation team.  The plan was reviewed with the patient on 5/23/2019. The patient has had the opportunity to provide input to the therapy team.    I have reviewed this Individualized Plan of Care and agree with its contents. Above documentation has been expanded, modified, adjusted to reflect the findings of my evaluations and goals for the patient. Physician:  Electronically signed by Fausto Coe MD on 5/23/19 at 5:38 PM

## 2019-05-23 NOTE — PROGRESS NOTES
[address]  Acute Rehabilitation Physical Therapy Progress Note    Date: 19  Patient Name: Rocky Spear       Room: 7217/2798-15  MRN: [id number]   Account: [de-identified]   : 1935  (80 y.o.) Gender: female     Referring Practitioner: Robert Vivas / Yamil Almaraz / June Allen   (rehab)       Dr. Luci Chavarria (ortho)   Diagnosis: S/P R hip hemiarthroplasty 19  Past Medical History:  has a past medical history of Allergic rhinitis, Anemia, Anxiety, Asthma, Cataract, COPD (chronic obstructive pulmonary disease) ([address] CTS (carpal tunnel syndrome), Esophageal reflux, Headache(784.0), Hyperlipidemia, Hypertension, Hypothyroidism, Osteoarthritis, Osteoporosis, and Rheumatoid arthritis(714.0). Past Surgical History:   has a past surgical history that includes Rotator cuff repair (Left); Tonsillectomy and adenoidectomy; Hysterectomy, vaginal; Foot surgery; Hemorrhoid surgery; Dilation and curettage of uterus; Cataract removal with implant (Bilateral); Inguinal hernia repair; Cystocele repair; joint replacement (Left); other surgical history; Hammer toe surgery; other surgical history; and HEMIARTHROPLASTY HIP (Right, 2019). Additional Pertinent Hx: RA, advanced age, R hemiarthroplasty 19    Overall Orientation Status: Within Functional Limits  Restrictions/Precautions  Restrictions/Precautions: Fall Risk;Weight Bearing  Required Braces or Orthoses?: No  Implants present? : (R hip hemiarthroplasty 19; L MARYANN)  Lower Extremity Weight Bearing Restrictions  Right Lower Extremity Weight Bearing: Weight Bearing As Tolerated  Position Activity Restriction  Other position/activity restrictions: Posterolateral approach - no hip flexion greater than 90 degrees, no hip adduction across midline, no hip hyperextension, and no external rotation. Subjective: Pt c/o not enough time to urinate this morning;  \"It takes a long time to get all that urine out\"  Comments: Pt sitting on toilet at the beginning of treatment time of 0730, pt is still sitting on toilet at 0800 but willing to get up at this time. Pt is very talkative and needs frequent redirection. Pt is very difficult to redirect until pt's entire thought is finished. Vital Signs  Patient Currently in Pain: Denies                   Bed Mobility:   Bed Mobility  Bridging: Stand by assistance(per 5/18 note)  Rolling: Stand by assistance(per 5/19 note)  Supine to Sit: Minimal assistance(per 5/20 note)  Sit to Supine: Moderate assistance(assist bilat LE)  Scooting: Contact guard assistance(per 5/21 note)  Bed mobility  Bridging: Stand by assistance(per 5/18 note)  Scooting: Contact guard assistance(per 5/21 note)    Transfers:  Sit to Stand: Minimal Assistance  Stand to sit: Minimal Assistance  Bed to Chair: Minimal assistance  Stand Pivot Transfers: Minimal Assistance(extra time, if pt isn't pleased, pt will \"redo it\")        WB Status: WBAT R  Ambulation 1  Surface: level tile  Device: Rolling Walker  Assistance: Contact guard assistance  Quality of Gait: very short shuffle steps, kyphotic posture  Gait Deviations: Decreased step length;Decreased step height;Slow Reece;Decreased arm swing;Decreased head and trunk rotation; Shuffles  Distance: 75ftx2  Comments: very slow reece with frequent stops, frequent redirecting as pt talks the whole time  Ambulation 2  Surface - 2: level tile  Device 2: Rolling Walker  Assistance 2: Minimal assistance  Quality of Gait 2: narrow DWAINE, short step length, shuffle step, forward flexed kyphotic posture, head and gaze downward, drifting left with RW  Distance: [id number]  Comments: seatd rest break in common room, vc's for upright posture and increased heel/toe gait     Stairs/Curb  Stairs?: No                                                       FIMS:      TRANSFERS  Bed, Chair, Wheel Chair: 3 - Requires 25-49% assistance to transfer  Toilet Transfer: 4 - Requires steadying assistance only < 25% assist LOCOMOTION  Primary Mode: Both  Distance Walked: 75ftx2, 358jpt2  Walk: 2 - Maximal Assistance Requires up to Maximal Assistance AND requires assistance of one person to walk between  feet (Patient performs 25-49% of locomotion effort or goes between  feet)  Stairs: 0 - Activity Does not Occur ( 0 only for the admission assessment)(pt not able today with current shoes,  to bring new)       BALANCE Posture: Poor(kyphotic)  Sitting - Static: Good  Sitting - Dynamic: Good  Standing - Static: Fair;+  Standing - Dynamic: Fair    EXERCISES    Other exercises 2: AA hip ab/ad seated  Other exercises 3: AA hip flex R/active L x 10  Other exercises 4: Seated BLE ex's maintain all precautions. AROM. Reps: 10 each.,  Other exercises 6: Static Standing 20 min. Other exercises 8: Standing Dynamic Reaching OOBOS without bilat UE support           Activity Tolerance: Patient Tolerated treatment well  PT Equipment Recommendations  Equipment Needed: No  Other Comments  Comments: easily distracted- vc's to stay on tasks    Patient Education  New Education Provided: Scheduled therapy time  Learner:patient  Method: demonstration and explanation       Outcome: needs reinforcement     Current Treatment Recommendations: Strengthening, ROM, Balance Training, Transfer Training, Gait Training, Stair training, Neuromuscular Re-education, Functional Mobility Training, Home Exercise Program, Safety Education & Training, Patient/Caregiver Education & Training, Wheelchair Mobility Training, Equipment Evaluation, Education, & procurement    Conditions Requiring Skilled Therapeutic Intervention  Body structures, Functions, Activity limitations: Decreased functional mobility ; Decreased ADL status; Decreased ROM; Decreased strength  Assessment: Impaired mobility due to L Hemiarthroplasty secondary to Hip Fx. Pt would benefit from additional PT to increase strength, endurance, and independence with functional mobility.    Treatment Diagnosis: difficulty walking R26.2  Prognosis: Good  Patient Education: Scheduled therapy time  Barriers to Learning: talkative  REQUIRES PT FOLLOW UP: Yes  Discharge Recommendations: Home with assist PRN;Home with Home health PT    Goals  Short term goals  Time Frame for Short term goals: by 1 week  Short term goal 1: supervision bed mobility  Short term goal 2: transfers Min A from multiple surfaces  Short term goal 3: Ambulate 30 ft with RW in < 3 min   Short term goal 4: Amb up/down 5 stairs el HR mod A x 1  Short term goal 5: min A WC mobility 25 ft using BLE.   Long term goals  Time Frame for Long term goals : by discharge  Long term goal 1: Bed mobility Mod I  Long term goal 2: Transfers Mod I multiple surfaces  Long term goal 3: Ambulate 50-60 ft MI with RW  Long term goal 4: Amb up/down 5 steps el HR min A x 1  Long term goal 5: pt will be indep with wc mobility 50 ft       05/23/19 0800 05/23/19 1515   PT Individual Minutes   Time In 0800 1515   Time Out 0830 1605   Minutes 30 50       Electronically signed by Guy Moore PTA on 5/23/19 at 4:20 PM

## 2019-05-23 NOTE — PROGRESS NOTES
[address] [facility].    Date:   5/22/2019  Patient name:  Nicki Ni  Date of admission:  5/21/2019  6:04 PM  MRN:   [id number]  YOB: 1935    CC- post op     HPI-  Pt with known RA   S/p right hip sx doing well   bp well controlled     REVIEW OF SYSTEMS:    · General----negative for fatigue, weight loss  · GI negative for nausea and vomiting, no dysphagia       EXAM-  /67   Pulse 105   Temp 98.1 °F (36.7 °C) (Oral)   Resp 16   Ht 4' 11\" (1.499 m)   Wt 130 lb (59 kg)   SpO2 94%   BMI 26.26 kg/m²      · General appearance: NAD conversant  · Lungs: normal effort, clear to auscultation bilaterally,no wheeze. · Heart: regular rate and rhythm, S1, S2 normal, no murmur  · Abdomen: soft, non-tender; no masses, no organomegaly  · Extremities: no cyanosis, no edema no clubbing no synovitis      Laboratory Testing:  CBC: No results for input(s): WBC, HGB, PLT in the last 72 hours. BMP:  No results for input(s): NA, K, CL, CO2, BUN, CREATININE, GLUCOSE in the last 72 hours. ASSESSMENT:    Patient Active Problem List   Diagnosis    Hypothyroidism    Osteoporosis    Esophageal reflux    Cataract    Hyperlipidemia    Osteoarthritis    Anemia    Allergic rhinitis    Asthma    Rheumatoid arthritis ([address]    Methotrexate lung    Benign essential HTN    Cushing syndrome ([address]    Closed fracture of right hip (HCC)    Closed fracture of neck of right femur with routine healing    History of hemiarthroplasty of right hip    S/P right hip fracture       PLAN:  Pt s/p right hip sx post op doing well  RA change prednisone to 10 daily   HTN well controlled   Anemia stable   MD KEVIN [facility]  [address] [facility], [address] [place] [address].    Phone [phone]   Fax: [phone]  Answering Service: [phone]

## 2019-05-23 NOTE — PLAN OF CARE
Problem: Pain:  Goal: Pain level will decrease  Description  Pain level will decrease  5/23/2019 0736 by Aletha Wright RN  Outcome: Met This Shift  5/23/2019 0003 by Jayesh Hernandez RN  Outcome: Ongoing  Note:   Adequate pain control achieved this shift. See MAR. Goal: Control of acute pain  Description  Control of acute pain  Outcome: Met This Shift  Goal: Control of chronic pain  Description  Control of chronic pain  Outcome: Met This Shift     Problem: Risk for Impaired Skin Integrity  Goal: Tissue integrity - skin and mucous membranes  Description  Structural intactness and normal physiological function of skin and  mucous membranes. 5/23/2019 0736 by Aletha Wright RN  Outcome: Met This Shift  5/23/2019 0003 by Jayesh Hernandez RN  Outcome: Ongoing  Note:   Skin integrity improved/maintained this shift. See head to toe assessment. Problem: Falls - Risk of:  Goal: Will remain free from falls  Description  Will remain free from falls  5/23/2019 0736 by Aletha Wright RN  Outcome: Met This Shift  5/23/2019 0003 by Jayesh Hernandez RN  Outcome: Ongoing  Note:   Pt. Free of falls and injuries this shift.    Goal: Absence of physical injury  Description  Absence of physical injury  Outcome: Met This Shift     Problem: Nutrition  Goal: Optimal nutrition therapy  Outcome: Met This Shift

## 2019-05-24 PROCEDURE — 94640 AIRWAY INHALATION TREATMENT: CPT

## 2019-05-24 PROCEDURE — 99232 SBSQ HOSP IP/OBS MODERATE 35: CPT | Performed by: PHYSICAL MEDICINE & REHABILITATION

## 2019-05-24 PROCEDURE — 6370000000 HC RX 637 (ALT 250 FOR IP): Performed by: ORTHOPAEDIC SURGERY

## 2019-05-24 PROCEDURE — 97535 SELF CARE MNGMENT TRAINING: CPT

## 2019-05-24 PROCEDURE — 97110 THERAPEUTIC EXERCISES: CPT

## 2019-05-24 PROCEDURE — 99232 SBSQ HOSP IP/OBS MODERATE 35: CPT | Performed by: INTERNAL MEDICINE

## 2019-05-24 PROCEDURE — 1180000000 HC REHAB R&B

## 2019-05-24 PROCEDURE — 97530 THERAPEUTIC ACTIVITIES: CPT

## 2019-05-24 PROCEDURE — 6370000000 HC RX 637 (ALT 250 FOR IP): Performed by: PHYSICAL MEDICINE & REHABILITATION

## 2019-05-24 PROCEDURE — 97116 GAIT TRAINING THERAPY: CPT

## 2019-05-24 RX ADMIN — OXYCODONE HYDROCHLORIDE AND ACETAMINOPHEN 1 TABLET: 5; 325 TABLET ORAL at 08:04

## 2019-05-24 RX ADMIN — LOSARTAN POTASSIUM 25 MG: 25 TABLET ORAL at 08:04

## 2019-05-24 RX ADMIN — OXYCODONE HYDROCHLORIDE AND ACETAMINOPHEN 1 TABLET: 5; 325 TABLET ORAL at 17:25

## 2019-05-24 RX ADMIN — LEVOTHYROXINE SODIUM 50 MCG: 50 TABLET ORAL at 07:20

## 2019-05-24 RX ADMIN — SENNOSIDES,DOCUSATE SODIUM 1 TABLET: 8.6; 5 TABLET, FILM COATED ORAL at 08:03

## 2019-05-24 RX ADMIN — OXYCODONE HYDROCHLORIDE AND ACETAMINOPHEN 1 TABLET: 5; 325 TABLET ORAL at 03:40

## 2019-05-24 RX ADMIN — IPRATROPIUM BROMIDE AND ALBUTEROL SULFATE 1 AMPULE: .5; 3 SOLUTION RESPIRATORY (INHALATION) at 21:08

## 2019-05-24 RX ADMIN — LORAZEPAM 0.5 MG: 0.5 TABLET ORAL at 08:43

## 2019-05-24 RX ADMIN — RIVAROXABAN 10 MG: 10 TABLET, FILM COATED ORAL at 17:21

## 2019-05-24 RX ADMIN — METOPROLOL TARTRATE 50 MG: 50 TABLET ORAL at 08:04

## 2019-05-24 RX ADMIN — METOPROLOL TARTRATE 50 MG: 50 TABLET ORAL at 20:36

## 2019-05-24 RX ADMIN — OXYCODONE HYDROCHLORIDE AND ACETAMINOPHEN 1 TABLET: 5; 325 TABLET ORAL at 12:55

## 2019-05-24 RX ADMIN — LORAZEPAM 0.5 MG: 0.5 TABLET ORAL at 20:38

## 2019-05-24 RX ADMIN — PANTOPRAZOLE SODIUM 40 MG: 40 TABLET, DELAYED RELEASE ORAL at 07:20

## 2019-05-24 RX ADMIN — PREDNISONE 10 MG: 10 TABLET ORAL at 08:04

## 2019-05-24 RX ADMIN — LORAZEPAM 0.5 MG: 0.5 TABLET ORAL at 14:14

## 2019-05-24 ASSESSMENT — PAIN DESCRIPTION - LOCATION
LOCATION: HIP
LOCATION: HIP

## 2019-05-24 ASSESSMENT — PAIN DESCRIPTION - ORIENTATION
ORIENTATION: RIGHT
ORIENTATION: RIGHT

## 2019-05-24 ASSESSMENT — PAIN SCALES - GENERAL
PAINLEVEL_OUTOF10: 6
PAINLEVEL_OUTOF10: 5
PAINLEVEL_OUTOF10: 6
PAINLEVEL_OUTOF10: 6

## 2019-05-24 ASSESSMENT — PAIN DESCRIPTION - PAIN TYPE
TYPE: ACUTE PAIN;SURGICAL PAIN
TYPE: ACUTE PAIN;SURGICAL PAIN

## 2019-05-24 NOTE — PROGRESS NOTES
Physical Medicine & Rehabilitation  Progress Note    5/24/2019 3:46 PM     CC: Ambulatory and ADL dysfunction due to right hip fracture    Subjective:   Feels well. No complaints. Pain controlled. ROS:  Denies fevers, chills, sweats. No chest pain, palpitations, lightheadedness. Denies coughing, wheezing or shortness of breath. Denies abdominal pain, nausea, diarrhea or constipation. No new areas of joint pain. Denies new areas of numbness or weakness. Denies new anxiety or depression issues. No new skin problems. Rehabilitation:   PT:  Restrictions/Precautions: Fall Risk, Weight Bearing  Implants present? : (R hip hemiarthroplasty 5/17/19; L MARYANN)  Hip Precautions: No hip flexion > 90 degrees, No ADduction, No hip extension, No hip external rotation  Other position/activity restrictions: Posterolateral approach - no hip flexion greater than 90 degrees, no hip adduction across midline, no hip hyperextension, and no external rotation. Right Lower Extremity Weight Bearing: Weight Bearing As Tolerated   Transfers  Sit to Stand: Contact guard assistance  Stand to sit: Contact guard assistance  Bed to Chair: Contact guard assistance  WB Status: WBAT R  Ambulation 1  Surface: level tile  Device: Rolling Walker  Assistance: Contact guard assistance  Quality of Gait: improved step length, improved upright posture, very slow pace  Gait Deviations: Decreased step length, Decreased step height, Slow Diane, Decreased arm swing, Decreased head and trunk rotation, Shuffles  Distance: [id number]  Comments: pt timed with stopwatch, first 100ft = 8min, second 100ft = 9min 33sec.     Transfers  Sit to Stand: Contact guard assistance  Stand to sit: Contact guard assistance  Bed to Chair: Contact guard assistance  Ambulation  Ambulation?: Yes  WB Status: WBAT R  Ambulation 1  Surface: level tile  Device: Rolling Walker  Assistance: Contact guard assistance  Quality of Gait: improved step length, improved upright posture, very slow pace  Gait Deviations: Decreased step length, Decreased step height, Slow Diane, Decreased arm swing, Decreased head and trunk rotation, Shuffles  Distance: [id number]  Comments: pt timed with stopwatch, first 100ft = 8min, second 100ft = 9min 33sec. Surface: level tile  Ambulation 1  Surface: level tile  Device: Rolling Walker  Assistance: Contact guard assistance  Quality of Gait: improved step length, improved upright posture, very slow pace  Gait Deviations: Decreased step length, Decreased step height, Slow Diane, Decreased arm swing, Decreased head and trunk rotation, Shuffles  Distance: [id number]  Comments: pt timed with stopwatch, first 100ft = 8min, second 100ft = 9min 33sec. OT:            Balance  Sitting Balance: Supervision  Standing Balance: Supervision(8 to 10 minutes x 2 times during session )   Standing Balance  Activity: Pt stands at sink 10 minutes to brush her teeth at sink. Pt stands several times getting up/down from toilet. Pt stands w CGA/min assist when she completes ana care/bottom care. Pt stands for lower body clothing management to pull up underpants/pj bottoms. Pt stands during transfers. Functional Mobility  Functional - Mobility Device: Rolling Walker  Activity: To/from bathroom  Assist Level: Minimal assistance  Functional Mobility Comments: Verbal cues using RW to ambulate to bathroom.  Pt ambulates using RW  to /from toilet w min assist.      Bed mobility  Bridging: Stand by assistance(per 5/18 note)  Supine to Sit: Minimal assistance  Scooting: Contact guard assistance(per 5/21 note)  Transfers  Stand Step Transfers: Minimal assistance  Sit to stand: Supervision  Stand to sit: Modified independent   Toilet Transfers  Toilet - Technique: Ambulating(Pt uses RW)  Equipment Used: (Rolling shower chair is on top of standard toilet.)  Toilet Transfer: Minimal assistance             Objective:  /65   Pulse 88   Temp 98.4 °F (36.9 °C) (Oral)   Resp 18   Ht 4' 11\" (1.499 m)   Wt 130 lb (59 kg)   SpO2 98%   BMI 26.26 kg/m²  I Body mass index is 26.26 kg/m². I   Wt Readings from Last 1 Encounters:   19 130 lb (59 kg)      Temp (24hrs), Av.1 °F (36.7 °C), Min:97.7 °F (36.5 °C), Max:98.4 °F (36.9 °C)         GEN: well developed, well nourished, no acute distress  HEENT: Normocephalic atraumatic, EOMI, mucous membranes pink and moist  CV: RRR, no murmurs, rubs or gallops  PULM: CTAB, no rales or rhonchi. Respirations WNL and unlabored  ABD: soft, NT, ND, +BS and equal  NEURO: A&O x3. Sensation intact to light touch. MSK: PROM within functional limits. UE, LLE, limited R prox, RA changes of hands, . EXTREMITIES: No calf tenderness to palpation bilaterally. No edema BLEs  SKIN: warm dry and intact with good turgor, right hip dressing clean ,no drainage  PSYCH: appropriately interactive. Affect WNL. Good spirits    Medications   Scheduled Meds:   sennosides-docusate sodium  1 tablet Oral BID    lidocaine  1 patch Transdermal Daily    rivaroxaban  10 mg Oral Daily    ipratropium-albuterol  1 ampule Inhalation Q4H WA    levothyroxine  50 mcg Oral Daily    lidocaine  1 patch Transdermal Daily    losartan  25 mg Oral Daily    metoprolol tartrate  50 mg Oral BID    pantoprazole  40 mg Oral QAM AC    polyethylene glycol  17 g Oral Daily    predniSONE  10 mg Oral Daily     Continuous Infusions:  PRN Meds:.aluminum & magnesium hydroxide-simethicone, bisacodyl, magnesium hydroxide, acetaminophen, LORazepam, oxyCODONE-acetaminophen     Diagnostics:     CBC:   Recent Labs     19  0622   WBC 8.1   RBC 3.70*   HGB 10.1*   HCT 31.1*   MCV 84.1   RDW 16.6*        BMP:   Recent Labs     19  0622      K 3.8      CO2 25   BUN 17   CREATININE <0.40*     BNP: No results for input(s): BNP in the last 72 hours. PT/INR: No results for input(s): PROTIME, INR in the last 72 hours. APTT: No results for input(s):  APTT in the last 72

## 2019-05-24 NOTE — PLAN OF CARE
Problem: Pain:  Goal: Pain level will decrease  Description  Pain level will decrease  Outcome: Ongoing  Goal: Control of acute pain  Description  Control of acute pain  Outcome: Ongoing  Goal: Control of chronic pain  Description  Control of chronic pain  Outcome: Ongoing     Problem: Risk for Impaired Skin Integrity  Goal: Tissue integrity - skin and mucous membranes  Description  Structural intactness and normal physiological function of skin and  mucous membranes.   Outcome: Ongoing     Problem: Falls - Risk of:  Goal: Will remain free from falls  Description  Will remain free from falls  Outcome: Ongoing  Goal: Absence of physical injury  Description  Absence of physical injury  Outcome: Ongoing     Problem: Nutrition  Goal: Optimal nutrition therapy  Outcome: Ongoing

## 2019-05-24 NOTE — PLAN OF CARE
Problem: Pain:  Goal: Pain level will decrease  Description  Pain level will decrease  5/24/2019 0152 by Latoya Ramires RN  Outcome: Ongoing     Problem: Pain:  Goal: Control of acute pain  Description  Control of acute pain  5/24/2019 0152 by Latoya Ramires RN  Outcome: Ongoing     Problem: Pain:  Goal: Control of chronic pain  Description  Control of chronic pain  5/24/2019 0152 by Latoya Ramires RN  Outcome: Ongoing     Problem: Risk for Impaired Skin Integrity  Goal: Tissue integrity - skin and mucous membranes  Description  Structural intactness and normal physiological function of skin and  mucous membranes.   5/24/2019 0152 by Latoya Ramires RN  Outcome: Ongoing     Problem: Falls - Risk of:  Goal: Will remain free from falls  Description  Will remain free from falls  5/24/2019 0152 by Latoya Ramires RN  Outcome: Ongoing     Problem: Falls - Risk of:  Goal: Absence of physical injury  Description  Absence of physical injury  5/24/2019 0152 by Latoya Ramires RN  Outcome: Ongoing     Problem: Nutrition  Goal: Optimal nutrition therapy  5/24/2019 0152 by Latoya Ramires RN  Outcome: Ongoing

## 2019-05-24 NOTE — PROGRESS NOTES
[facility]    Date:   5/23/2019  Patient name:  Richard Malcolm  Date of admission:  5/21/2019  6:04 PM  MRN:   [id number]  YOB: 1935    CC- post op     HPI-  Pt with known RA   S/p right hip sx doing well   bp mild elevation     Pain well controlled     REVIEW OF SYSTEMS:    · General----negative for fatigue, weight loss  · GI negative for nausea and vomiting, no dysphagia       EXAM-  BP (!) 143/67   Pulse 95   Temp 97.7 °F (36.5 °C) (Oral)   Resp 20   Ht 4' 11\" (1.499 m)   Wt 130 lb (59 kg)   SpO2 95%   BMI 26.26 kg/m²      · General appearance: NAD conversant  · Lungs: normal effort, clear to auscultation bilaterally,no wheeze. · Heart: regular rate and rhythm, S1, S2 normal, no murmur  · Abdomen: soft, non-tender; no masses, no organomegaly  · Extremities: no cyanosis, no edema no clubbing no synovitis      Laboratory Testing:  CBC:   Recent Labs     05/23/19  0622   WBC 8.1   HGB 10.1*        BMP:    Recent Labs     05/23/19  0622      K 3.8      CO2 25   BUN 17   CREATININE <0.40*   GLUCOSE 137*         ASSESSMENT:    Patient Active Problem List   Diagnosis    Hypothyroidism    Osteoporosis    Esophageal reflux    Cataract    Hyperlipidemia    Osteoarthritis    Anemia    Allergic rhinitis    Asthma    Rheumatoid arthritis (HCC)    Methotrexate lung    Benign essential HTN    Cushing syndrome ([facility]    Closed fracture of right hip (HCC)    Closed fracture of neck of right femur with routine healing    History of hemiarthroplasty of right hip    S/P right hip fracture       PLAN:  Pt s/p right hip sx post op doing well  RA change prednisone to 10 daily   HTN stable    Anemia stable on xarelto for dvt MD KEVIN Daley [facility]  [address] [facility], [address] [place] [address].    Phone [phone]   Fax: [phone]  Answering Service: [phone]

## 2019-05-24 NOTE — PROGRESS NOTES
19 0854 19 1133   OT Individual Minutes   Time In [id number]   Time Out 0840 1020 [facility] REHABILITATION OCCUPATIONAL THERAPY  DAILY NOTE    Date: 19  Patient Name: Ed Vickers      Room: 0191/7552-41    MRN: [id number]   : 1935  (80 y.o.)  Gender: female   Referring Practitioner: Robert Doe / Adilene Cook / Elias Reyes   (rehab)       Dr. 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 : (R hip hemiarthroplasty 19; L MARYANN)  Hip Precautions: No hip flexion > 90 degrees, No ADduction, No hip extension, No hip external rotation  Other position/activity restrictions: Posterolateral approach - no hip flexion greater than 90 degrees, no hip adduction across midline, no hip hyperextension, and no external rotation. Right Lower Extremity Weight Bearing: Weight Bearing As Tolerated  Required Braces or Orthoses?: No    Subjective \"and there are some on the side side I've had hemmoroid sx and I need to squeeze it down\"  Pain Level: 6  Pain Location: Hip  Pain Orientation: Right     Overall Orientation Status: Within Functional Limits     Pain Assessment  Pain Level: 6  Pain Type: Acute pain, Surgical pain  Pain Location: Hip  Pain Orientation: Right    Objective Pt obsesses with bm's. Appears anxious and OCD which results in poor time management  Nsg called to give meds. Hyperverbal and needs thorough explanations before agreeing to any changes. Walker placed over raised toilet seat due to patient dangling with urine running down legs when using present shower seat. For sit to stand  needs bilateral arm rests. Discussed reacher should be velcroed to r/w at home so she can access things low and high. Ice provided for hip pain.   present for tx. Continue POC      Balance  Standing Balance: Supervision(8 to 10 minutes x 2 times during session )  Transfers  Sit to stand: Supervision  Stand to sit: Modified independent                                              OT FIM:   Groomin - Requires setup/cues to do all tasks(does not brush hair \"my hairdresser does my hair once a month\" )  Bathin - Able to bathe all 10 areas with setup/sup/cues  Dressing-Upper: 5 - Requires setup/supervision/cues and/or requires assist with presthesis/brace only  Dressing-Lower: 5 - Requires setup/supervision/cues and/or staff applies TEDS/prosthesis/brace only(did not change socks this tx )  Toiletin - Requires setup/supervision/cues  Toilet Transfer: 5 - Requires setup/supervision/cues(w/rw)  Shower Transfer: 0 - Activity does not occur(per patient \"does not shower\" )               Assessment  Performance deficits / Impairments: Decreased functional mobility ; Decreased ADL status; Decreased ROM; Decreased strength;Decreased safe awareness;Decreased cognition;Decreased endurance;Decreased balance;Decreased high-level IADLs;Decreased coordination  Prognosis: Good  Activity Tolerance: Patient Tolerated treatment well  Safety Devices in place: Yes  Type of devices: Gait belt;Patient at risk for falls;Call light within reach;Nurse notified          Patient Education:  Patient Goals   Patient goals : To Return Home able to care for herself      Learner:patient  Method: explanation       Outcome: demonstrated understanding     Plan  Plan  Times per week: 900/7   Times per day: Twice a day  Current Treatment Recommendations: Self-Care / ADL, Balance Training, Functional Mobility Training, Endurance Training, Safety Education & Training, Patient/Caregiver Education & Training, Positioning, Strengthening, ROM, Cognitive Reorientation, Home Management Training  Plan Comment: continue OT  Patient Goals   Patient goals :  To Return Home able to care for herself Short term goals  Time Frame for Short term goals: 1 week   Short term goal 1: Pt will V/D Good understanding of AE/DME/modified techniques for increased IND with self-care and mobility. Short term goal 2: Pt will actively participate in 10+ minutes of self-care to the best of Pt's ability to promote increased IND with self-care. Short term goal 3: Pt will perform bed mobility with Min A to sit EOB for increased participation with self-care. Short term goal 4: Pt will perform sit to stand transfer with Min A with RW with Good safety awareness. Short term goal 5: Pt will actively participate in 10-15 minutes in therapeutic exercise/functional activities to promote increased IND with self-care and mobility.   Long term goals  Time Frame for Long term goals : By Discharge   Long term goal 1: D/V understanding of Joint protection strategies with application to care needs   Long term goal 2: Mod I for basic premorbid self care tasks    Long term goal 3: Tolerate standing for 10 minutes for self care and home management tasks        Electronically signed by DENISSE Fish on 5/24/19 at 11:36 AM

## 2019-05-24 NOTE — PROGRESS NOTES
[address]   Acute Rehabilitation Physical Therapy Progress Note    Date: 19  Patient Name: Selvin Roper       Room: 6544/0843-48  MRN: [id number]   Account: [de-identified]   : 1935  (80 y.o.) Gender: female     Referring Practitioner: Robert Guadarrama / Nash Martinez / Jose Walton   (rehab)       Dr. Carter (ortho)   Diagnosis: S/P R hip hemiarthroplasty 19  Past Medical History:  has a past medical history of Allergic rhinitis, Anemia, Anxiety, Asthma, Cataract, COPD (chronic obstructive pulmonary disease) ([address] CTS (carpal tunnel syndrome), Esophageal reflux, Headache(784.0), Hyperlipidemia, Hypertension, Hypothyroidism, Osteoarthritis, Osteoporosis, and Rheumatoid arthritis(714.0). Past Surgical History:   has a past surgical history that includes Rotator cuff repair (Left); Tonsillectomy and adenoidectomy; Hysterectomy, vaginal; Foot surgery; Hemorrhoid surgery; Dilation and curettage of uterus; Cataract removal with implant (Bilateral); Inguinal hernia repair; Cystocele repair; joint replacement (Left); other surgical history; Hammer toe surgery; other surgical history; and HEMIARTHROPLASTY HIP (Right, 2019). Additional Pertinent Hx: RA, advanced age, R hemiarthroplasty 19    Overall Orientation Status: Within Functional Limits  Restrictions/Precautions  Restrictions/Precautions: Fall Risk;Weight Bearing  Required Braces or Orthoses?: No  Implants present? : (R hip hemiarthroplasty 19; L MARYANN)  Lower Extremity Weight Bearing Restrictions  Right Lower Extremity Weight Bearing: Weight Bearing As Tolerated  Position Activity Restriction  Other position/activity restrictions: Posterolateral approach - no hip flexion greater than 90 degrees, no hip adduction across midline, no hip hyperextension, and no external rotation. Subjective: Pt c/o no pain patch on right knee and still waiting on ativan  Comments: Pt is so talkative that it slows progress.  Pt is encouraged to limit talking by timing each activity. Vital Signs  Patient Currently in Pain: Denies  Pain Assessment: 0-10  Pain Level: 6  Pain Type: Acute pain;Surgical pain  Pain Location: Hip  Pain Orientation: Right  Non-Pharmaceutical Pain Intervention(s): Ambulation/Increased Activity; Distraction;Repositioned  Response to Pain Intervention: Patient Satisfied                Bed Mobility:   Bed Mobility  Bridging: Stand by assistance(per 5/18 note)  Rolling: Stand by assistance(per 5/19 note)  Supine to Sit: Minimal assistance(per 5/20 note)  Sit to Supine: Moderate assistance(assist bilat LE)  Scooting: Contact guard assistance(per 5/21 note)  Bed mobility  Bridging: Stand by assistance(per 5/18 note)  Scooting: Contact guard assistance(per 5/21 note)    Transfers:  Sit to Stand: Contact guard assistance  Stand to sit: Contact guard assistance  Bed to Chair: Contact guard assistance  Stand Pivot Transfers: Contact guard assistance(extra time, if pt isn't pleased, pt will \"redo it\")        WB Status: WBAT R  Ambulation 1  Surface: level tile  Device: Rolling Walker  Assistance: Contact guard assistance  Quality of Gait: improved step length, improved upright posture, very slow pace  Gait Deviations: Decreased step length;Decreased step height;Slow Diane;Decreased arm swing;Decreased head and trunk rotation; Shuffles  Distance: [id number]  Comments: pt timed with stopwatch, first 100ft = 8min, second 100ft = 9min 33sec.   Ambulation 2  Surface - 2: level tile  Device 2: Rolling Walker  Assistance 2: Stand by assistance  Quality of Gait 2: narrow DWAINE, short step length, shuffle step, forward flexed kyphotic posture, head and gaze downward, drifting left with RW  Distance: 200ft  Comments: 4 standing rest breaks, able to perform 200ft in 7 min 38sec     Stairs/Curb  Stairs?: No                                                       FIMS:      TRANSFERS  Bed, Chair, Wheel Chair: 4 - Requires steadying assistance only <25% assist  and/or requires assist with one leg only  Toilet Transfer: 5 - Requires setup/supervision/cues   LOCOMOTION  Primary Mode: Both  Distance Walked: 100ft, 200ft  Walk: 2 - Maximal Assistance Requires up to Maximal Assistance AND requires assistance of one person to walk between  feet (Patient performs 25-49% of locomotion effort or goes between  feet)  Stairs: (20min to complete bathroom function, unable to complete step)       BALANCE Posture: Fair(kyphotic, vc's upright posture)  Sitting - Static: Good  Sitting - Dynamic: Good  Standing - Static: Good  Standing - Dynamic: Fair;+  Comments: standing with & without RW    EXERCISES    Other exercises 4: Seated BLE ex's maintain all precautions. AROM. Reps: 10 each. , 1.5lb ankle weight  Other exercises 7: Toilet transfer, pt needed assist with setup - pt able to perform pericare, pt unable to pull up pants and brief (pt very slow at this process and took 20min)  Other exercises 8: Standing Dynamic Reaching OOBOS without bilat UE support  Other Activities  Comment: Max vc's to stay on tasks. Activity Tolerance: Patient Tolerated treatment well  Activity Tolerance: Slow with all activities and requires extra patience. Pt encouraged to peform activities with a stop watch to improve overall function.   PT Equipment Recommendations  Equipment Needed: No  Other Comments  Comments: easily distracted- vc's to stay on tasks    Patient Education  New Education Provided: Scheduled therapy time  Learner:patient  Method: demonstration and explanation       Outcome: needs reinforcement     Current Treatment Recommendations: Strengthening, ROM, Balance Training, Transfer Training, Gait Training, Stair training, Neuromuscular Re-education, Functional Mobility Training, Home Exercise Program, Safety Education & Training, Patient/Caregiver Education & Training, Wheelchair Mobility Training, Equipment Evaluation, Education, & procurement    Conditions Requiring Skilled Therapeutic Intervention  Body structures, Functions, Activity limitations: Decreased functional mobility ; Decreased ADL status; Decreased ROM; Decreased strength  Treatment Diagnosis: difficulty walking R26.2  Prognosis: Good  Patient Education: Scheduled therapy time  Barriers to Learning: talkative  REQUIRES PT FOLLOW UP: Yes  Discharge Recommendations: Home with assist PRN;Home with Home health PT    Goals  Short term goals  Time Frame for Short term goals: by 1 week  Short term goal 1: supervision bed mobility  Short term goal 2: transfers Min A from multiple surfaces  Short term goal 3: Ambulate 30 ft with RW in < 3 min   Short term goal 4: Amb up/down 5 stairs el HR mod A x 1  Short term goal 5: min A WC mobility 25 ft using BLE.   Long term goals  Time Frame for Long term goals : by discharge  Long term goal 1: Bed mobility Mod I  Long term goal 2: Transfers Mod I multiple surfaces  Long term goal 3: Ambulate 50-60 ft MI with RW  Long term goal 4: Amb up/down 5 steps el HR min A x 1  Long term goal 5: pt will be indep with wc mobility 50 ft       05/24/19 0842 05/24/19 1245   PT Individual Minutes   Time In 5968 [id number]   Time Out 0932 1330   Minutes 50 45       Electronically signed by Nicolasa Damian PTA on 5/24/19 at 4:27 PM

## 2019-05-25 PROCEDURE — 6370000000 HC RX 637 (ALT 250 FOR IP): Performed by: PHYSICAL MEDICINE & REHABILITATION

## 2019-05-25 PROCEDURE — 1180000000 HC REHAB R&B

## 2019-05-25 PROCEDURE — 94761 N-INVAS EAR/PLS OXIMETRY MLT: CPT

## 2019-05-25 PROCEDURE — 94640 AIRWAY INHALATION TREATMENT: CPT

## 2019-05-25 PROCEDURE — 97116 GAIT TRAINING THERAPY: CPT

## 2019-05-25 PROCEDURE — 99232 SBSQ HOSP IP/OBS MODERATE 35: CPT | Performed by: PHYSICAL MEDICINE & REHABILITATION

## 2019-05-25 PROCEDURE — 6370000000 HC RX 637 (ALT 250 FOR IP): Performed by: ORTHOPAEDIC SURGERY

## 2019-05-25 PROCEDURE — 99231 SBSQ HOSP IP/OBS SF/LOW 25: CPT | Performed by: INTERNAL MEDICINE

## 2019-05-25 PROCEDURE — 97110 THERAPEUTIC EXERCISES: CPT

## 2019-05-25 PROCEDURE — 97535 SELF CARE MNGMENT TRAINING: CPT

## 2019-05-25 PROCEDURE — 97530 THERAPEUTIC ACTIVITIES: CPT

## 2019-05-25 RX ADMIN — IPRATROPIUM BROMIDE AND ALBUTEROL SULFATE 1 AMPULE: .5; 3 SOLUTION RESPIRATORY (INHALATION) at 08:53

## 2019-05-25 RX ADMIN — METOPROLOL TARTRATE 50 MG: 50 TABLET ORAL at 09:25

## 2019-05-25 RX ADMIN — LOSARTAN POTASSIUM 25 MG: 25 TABLET ORAL at 09:25

## 2019-05-25 RX ADMIN — LORAZEPAM 0.5 MG: 0.5 TABLET ORAL at 07:48

## 2019-05-25 RX ADMIN — OXYCODONE HYDROCHLORIDE AND ACETAMINOPHEN 1 TABLET: 5; 325 TABLET ORAL at 00:21

## 2019-05-25 RX ADMIN — OXYCODONE HYDROCHLORIDE AND ACETAMINOPHEN 1 TABLET: 5; 325 TABLET ORAL at 04:30

## 2019-05-25 RX ADMIN — LORAZEPAM 0.5 MG: 0.5 TABLET ORAL at 12:38

## 2019-05-25 RX ADMIN — OXYCODONE HYDROCHLORIDE AND ACETAMINOPHEN 1 TABLET: 5; 325 TABLET ORAL at 14:35

## 2019-05-25 RX ADMIN — LORAZEPAM 0.5 MG: 0.5 TABLET ORAL at 21:24

## 2019-05-25 RX ADMIN — POLYETHYLENE GLYCOL 3350 17 G: 17 POWDER, FOR SOLUTION ORAL at 00:20

## 2019-05-25 RX ADMIN — LORAZEPAM 0.5 MG: 0.5 TABLET ORAL at 03:22

## 2019-05-25 RX ADMIN — SENNOSIDES,DOCUSATE SODIUM 1 TABLET: 8.6; 5 TABLET, FILM COATED ORAL at 09:25

## 2019-05-25 RX ADMIN — OXYCODONE HYDROCHLORIDE AND ACETAMINOPHEN 1 TABLET: 5; 325 TABLET ORAL at 09:25

## 2019-05-25 RX ADMIN — PREDNISONE 10 MG: 10 TABLET ORAL at 09:25

## 2019-05-25 RX ADMIN — IPRATROPIUM BROMIDE AND ALBUTEROL SULFATE 1 AMPULE: .5; 3 SOLUTION RESPIRATORY (INHALATION) at 12:25

## 2019-05-25 RX ADMIN — METOPROLOL TARTRATE 50 MG: 50 TABLET ORAL at 21:24

## 2019-05-25 RX ADMIN — IPRATROPIUM BROMIDE AND ALBUTEROL SULFATE 1 AMPULE: .5; 3 SOLUTION RESPIRATORY (INHALATION) at 16:11

## 2019-05-25 RX ADMIN — LEVOTHYROXINE SODIUM 50 MCG: 50 TABLET ORAL at 07:48

## 2019-05-25 RX ADMIN — ACETAMINOPHEN 650 MG: 325 TABLET, FILM COATED ORAL at 12:38

## 2019-05-25 RX ADMIN — RIVAROXABAN 10 MG: 10 TABLET, FILM COATED ORAL at 17:20

## 2019-05-25 RX ADMIN — PANTOPRAZOLE SODIUM 40 MG: 40 TABLET, DELAYED RELEASE ORAL at 07:48

## 2019-05-25 RX ADMIN — LORAZEPAM 0.5 MG: 0.5 TABLET ORAL at 17:25

## 2019-05-25 RX ADMIN — IPRATROPIUM BROMIDE AND ALBUTEROL SULFATE 1 AMPULE: .5; 3 SOLUTION RESPIRATORY (INHALATION) at 18:47

## 2019-05-25 RX ADMIN — OXYCODONE HYDROCHLORIDE AND ACETAMINOPHEN 1 TABLET: 5; 325 TABLET ORAL at 21:25

## 2019-05-25 ASSESSMENT — PAIN SCALES - GENERAL
PAINLEVEL_OUTOF10: 6
PAINLEVEL_OUTOF10: 7
PAINLEVEL_OUTOF10: 7
PAINLEVEL_OUTOF10: 6
PAINLEVEL_OUTOF10: 7
PAINLEVEL_OUTOF10: 6
PAINLEVEL_OUTOF10: 6
PAINLEVEL_OUTOF10: 7

## 2019-05-25 ASSESSMENT — PAIN DESCRIPTION - PAIN TYPE: TYPE: ACUTE PAIN

## 2019-05-25 ASSESSMENT — PAIN DESCRIPTION - ORIENTATION: ORIENTATION: RIGHT

## 2019-05-25 ASSESSMENT — PAIN DESCRIPTION - LOCATION
LOCATION: GROIN;HIP
LOCATION: GROIN

## 2019-05-25 NOTE — PROGRESS NOTES
[place] [address] Internal Medicine    Progress Note    5/24/2019    11:20 PM    Name:   Nicki Ni  MRN:     [id number]     [place]:      [de-identified]   Room:   [address] [place] [address] Day:  3  Admit Date:  5/21/2019  6:04 PM    PCP:   Ulysses Lema MD  Code Status:  Full Code    Subjective:     C/C: No chief complaint on file. Interval History Status: improved. HPI:   Patient has history of rheumatoid arthritis, chronically on steroids, osteoporosis. Patient had right hip fracture, after she sustained a fall.  status post surgery, doing better    Review of Systems:     Constitutional:  negative for chills, fevers, sweats  Respiratory:  negative for cough, dyspnea on exertion, hemoptysis, shortness of breath, wheezing  Cardiovascular:  negative for chest pain, chest pressure/discomfort, lower extremity edema, palpitations  Gastrointestinal:  negative for abdominal pain, constipation, diarrhea, nausea, vomiting  Neurological:  negative for dizziness, headache  Extremity - Pain in Right Hip area   Medications: Allergies:     Allergies   Allergen Reactions    No Known Allergies      Other reaction(s): Unknown    Seasonal Other (See Comments)     Congestion       Current Meds:   Scheduled Meds:    sennosides-docusate sodium  1 tablet Oral BID    lidocaine  1 patch Transdermal Daily    rivaroxaban  10 mg Oral Daily    ipratropium-albuterol  1 ampule Inhalation Q4H [place]    levothyroxine  50 mcg Oral Daily    lidocaine  1 patch Transdermal Daily    losartan  25 mg Oral Daily    metoprolol tartrate  50 mg Oral BID    pantoprazole  40 mg Oral QAM AC    polyethylene glycol  17 g Oral Daily    predniSONE  10 mg Oral Daily     Continuous Infusions:   PRN Meds: aluminum & magnesium hydroxide-simethicone, bisacodyl, magnesium hydroxide, acetaminophen, LORazepam, oxyCODONE-acetaminophen    Data:     Past Medical History:   has a past medical history of Allergic rhinitis, Anemia, hepatomegaly, splenomegaly  Extremities:  no edema, redness, tenderness in the calves  Skin:  no gross lesions, rashes, induration    Assessment:        Primary Problem  <principal problem not specified>    Active Hospital Problems    Diagnosis Date Noted    S/P right hip fracture [Z87.81] 05/22/2019    History of hemiarthroplasty of right hip [Z96.641] 05/21/2019    Rheumatoid arthritis ([address] [M06.9] 12/01/2014    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 Right hip fracture status post surgery  2. Rheumatoid arthritis, on steroids her home dose of prednisone was started  3. Patient is on DVT prophylaxis with Xarelto  4. Hypertension, controlled  5.  Hypothyroidism, patient is on Synthroid    Gaby Jhaveri MD  5/24/2019  11:20 PM

## 2019-05-25 NOTE — PROGRESS NOTES
Physical Medicine & Rehabilitation  Progress Note    Chief complaint:  ADL and mobility dysfunction secondary to right hip fracture    Subjective:      Patient is without new complaints. Positive bowel movement on Friday. Pain 6/10 from multiple sources including hip and low back which is chronic. ROS:  Denies fevers, chills, sweats. No chest pain, palpitations, lightheadedness. Denies coughing, wheezing or shortness of breath. Denies abdominal pain, nausea, diarrhea or constipation. No new areas of joint pain. Denies new areas of numbness or weakness. Denies new anxiety or depression issues. No new skin problems. Rehabilitation:   Progressing in therapies. PT:  Restrictions/Precautions: Fall Risk, Weight Bearing  Implants present? : (R hip hemiarthroplasty 5/17/19; L MARYANN)  Hip Precautions: No hip flexion > 90 degrees, No ADduction, No hip extension, No hip external rotation  Other position/activity restrictions: Posterolateral approach - no hip flexion greater than 90 degrees, no hip adduction across midline, no hip hyperextension, and no external rotation. Right Lower Extremity Weight Bearing: Weight Bearing As Tolerated   Transfers  Sit to Stand: Contact guard assistance  Stand to sit: Contact guard assistance  Bed to Chair: Contact guard assistance  WB Status: WBAT R  Ambulation 1  Surface: level tile  Device: Rolling Walker  Assistance: Contact guard assistance  Quality of Gait: improved step length, improved upright posture, very slow pace  Gait Deviations: Decreased step length, Decreased step height, Slow Diane, Decreased arm swing, Decreased head and trunk rotation, Shuffles  Distance: [id number]  Comments: pt timed with stopwatch, first 100ft = 8min, second 100ft = 9min 33sec.     Transfers  Sit to Stand: Contact guard assistance  Stand to sit: Contact guard assistance  Bed to Chair: Contact guard assistance  Ambulation  Ambulation?: Yes  WB Status: WBAT R  Ambulation 1  Surface: level Transfer: Minimal assistance             Objective:  BP (!) 155/66   Pulse 92   Temp 98.3 °F (36.8 °C) (Oral)   Resp 16   Ht 4' 11\" (1.499 m)   Wt 130 lb (59 kg)   SpO2 98%   BMI 26.26 kg/m²       Alert, no distress. Good speech and language function. Lungs clear, no wheezing. Heart regular. Abdomen - soft, positive bowel sounds, non-distended, non-tender. No calf tenderness or edema. Incision site with Aquacel in place no warmth around dressing. Diagnostics:     CBC:   Recent Labs     05/23/19 0622   WBC 8.1   RBC 3.70*   HGB 10.1*   HCT 31.1*   MCV 84.1   RDW 16.6*        BMP:   Recent Labs     05/23/19 0622      K 3.8      CO2 25   BUN 17   CREATININE <0.40*     BNP: No results for input(s): BNP in the last 72 hours. PT/INR: No results for input(s): PROTIME, INR in the last 72 hours. APTT: No results for input(s): APTT in the last 72 hours. CARDIAC ENZYMES: No results for input(s): CKMB, CKMBINDEX, TROPONINT in the last 72 hours. Invalid input(s): CKTOTAL;3  FASTING LIPID PANEL:No results found for: CHOL, HDL, TRIG  LIVER PROFILE: No results for input(s): AST, ALT, ALB, BILIDIR, BILITOT, ALKPHOS in the last 72 hours.      Current Medications:   Current Facility-Administered Medications: aluminum & magnesium hydroxide-simethicone (MAALOX) 200-200-20 MG/5ML suspension 30 mL, 30 mL, Oral, Q6H PRN  sennosides-docusate sodium (SENOKOT-S) 8.6-50 MG tablet 1 tablet, 1 tablet, Oral, BID  bisacodyl (DULCOLAX) suppository 10 mg, 10 mg, Rectal, Daily PRN  magnesium hydroxide (MILK OF MAGNESIA) 400 MG/5ML suspension 30 mL, 30 mL, Oral, Daily PRN  lidocaine 4 % external patch 1 patch, 1 patch, Transdermal, Daily  acetaminophen (TYLENOL) tablet 650 mg, 650 mg, Oral, Q4H PRN  rivaroxaban (XARELTO) tablet 10 mg, 10 mg, Oral, Daily  ipratropium-albuterol (DUONEB) nebulizer solution 1 ampule, 1 ampule, Inhalation, Q4H [place]  levothyroxine (SYNTHROID) tablet 50 mcg, 50 mcg, Oral, Daily  lidocaine 4 % external patch 1 patch, 1 patch, Transdermal, Daily  LORazepam (ATIVAN) tablet 0.5 mg, 0.5 mg, Oral, Q4H PRN  losartan (COZAAR) tablet 25 mg, 25 mg, Oral, Daily  metoprolol tartrate (LOPRESSOR) tablet 50 mg, 50 mg, Oral, BID  oxyCODONE-acetaminophen (PERCOCET) 5-325 MG per tablet 1 tablet, 1 tablet, Oral, Q4H PRN  pantoprazole (PROTONIX) tablet 40 mg, 40 mg, Oral, QAM AC  polyethylene glycol (GLYCOLAX) packet 17 g, 17 g, Oral, Daily  predniSONE (DELTASONE) tablet 10 mg, 10 mg, Oral, Daily      Impression/Plan:  Patient is an 51-year-old female with right hip fracture status post hemiarthroplasty    1. Continue acute inpatient rehab to work on transfers, self-care, ADLs, ambulation. Patient doing well. Seen walking with nurse's aide to bathroom using rolling walker. 2. Orthopedics-right subcapital femoral fracture with hemiarthroplasty. 3. Rheumatology-history of rheumatoid arthritis on prednisone. 4. Hematology-postop anemia. Hemoglobin 10.1 on 5/23/2019. Patient is asymptomatic. 5. Glucose elevation-monitoring. Last was 137 on blood drawl. On prednisone. Continue present management. 6. Hypertension-being monitored by internal medicine patient is currently on Cozaar and Lopressor. 7. Endocrine-patient with history of hypothyroidism on Synthroid. 8. Pulmonary- history of asthma and COPD. On DuoNeb treatments. 9. GI-on Protonix, GlycoLax, and Colace. 10. DVT prophylaxis-on Xarelto. 11. Internal medicine input appreciated. Electronically signed by Andrea Wray MD on 5/25/2019 at 11:03 AM      This note is created with the assistance of a speech recognition program.  While intending to generate a document that actually reflects the content of the visit, the document can still have some errors including those of syntax and sound a like substitutions which may escape proof reading.   In such instances, actual meaning can be extrapolated by contextual diversion.

## 2019-05-25 NOTE — PLAN OF CARE
Problem: Pain:  Goal: Pain level will decrease  Description  Pain level will decrease  5/25/2019 1755 by Jaky Stinson RN  Outcome: Met This Shift  Pain is under control with currently ordered pain medications. Problem: Falls - Risk of:  Goal: Will remain free from falls  Description  Will remain free from falls  5/25/2019 1755 by Jaky Stinson RN  Outcome: Met This Shift  No falls this shift. Patient is alert and oriented. Call light within reach.

## 2019-05-25 NOTE — PROGRESS NOTES
Physical Therapy  [address]  Acute Rehabilitation Physical Therapy Progress Note    Date: 19  Patient Name: Lucila Correa       Room: 0882/0180-07  MRN: [id number]   Account: [de-identified]   : 1935  (80 y.o.) Gender: female     Referring Practitioner: Robert Lopez / Frank Sanford / Matthieu Blackwood   (rehab)       Dr. Chantel Self (ortho)   Diagnosis: S/P R hip hemiarthroplasty 19  Past Medical History:  has a past medical history of Allergic rhinitis, Anemia, Anxiety, Asthma, Cataract, COPD (chronic obstructive pulmonary disease) ([address] CTS (carpal tunnel syndrome), Esophageal reflux, Headache(784.0), Hyperlipidemia, Hypertension, Hypothyroidism, Osteoarthritis, Osteoporosis, and Rheumatoid arthritis(714.0). Past Surgical History:   has a past surgical history that includes Rotator cuff repair (Left); Tonsillectomy and adenoidectomy; Hysterectomy, vaginal; Foot surgery; Hemorrhoid surgery; Dilation and curettage of uterus; Cataract removal with implant (Bilateral); Inguinal hernia repair; Cystocele repair; joint replacement (Left); other surgical history; Hammer toe surgery; other surgical history; and HEMIARTHROPLASTY HIP (Right, 2019). Additional Pertinent Hx: RA, advanced age, R hemiarthroplasty 19       Restrictions/Precautions  Restrictions/Precautions: Fall Risk;Weight Bearing  Required Braces or Orthoses?: No  Implants present? : (R hip hemiarthroplasty 19; L MARYANN)  Lower Extremity Weight Bearing Restrictions  Right Lower Extremity Weight Bearing: Weight Bearing As Tolerated  Position Activity Restriction  Hip Precautions: No hip flexion > 90 degrees; No ADduction; No hip extension; No hip external rotation  Other position/activity restrictions: Posterolateral approach - no hip flexion greater than 90 degrees, no hip adduction across midline, no hip hyperextension, and no external rotation. Vital Signs  Patient Currently in Pain: Yes  Pain Location: Groin; Hip  Pain Orientation: Right                Bed Mobility:   Bed Mobility  Supine to Sit: Minimal assistance(w/(R) LE only)  Sit to Supine: Contact guard assistance(for safety w/(R) LE)  Scooting: Supervision  Bed mobility  Scooting: Supervision    Transfers:  Sit to Stand: Contact guard assistance  Stand to sit: Contact guard assistance     Stand Pivot Transfers: Contact guard assistance(w/RW)        WB Status: WBAT R  Ambulation 1  Surface: level tile  Device: Rolling Walker  Assistance: Contact guard assistance  Quality of Gait: slow pace, steady step through pattern  Gait Deviations: Slow Diane  Distance: 100ft x 2  Comments: seated rest break between walks. VC's to look up  Ambulation 2  Surface - 2: level tile  Device 2: Rolling Walker  Assistance 2: Contact guard assistance;Stand by assistance  Quality of Gait 2: improved posture and sequencing  Distance: 200ft     Stairs/Curb  Stairs?: Yes  Stairs  # Steps : 12  Stairs Height: 6\"  Rails: Bilateral  Device: No Device  Assistance: Contact guard assistance  Comment: VC's for sequencing. Pt ascends steps fwd and decends steps retro at home. Simulated home situation with stairs today, up fwd and down retro. Steady Keshav Hali technique    FIMS:      TRANSFERS  Bed, Chair, Wheel Chair: 4 - Requires steadying assistance only <25% assist  and/or requires assist with one leg only   LOCOMOTION  Primary Mode:  Both  Distance Walked: 100ft  Walk: 2 - Maximal Assistance Requires up to Maximal Assistance AND requires assistance of one person to walk between  feet (Patient performs 25-49% of locomotion effort or goes between  feet)  Stairs: 4- Minimal Contact Assistance Perfoms 75% or more of the effort to go up and down one flight of stairs       BALANCE Posture: Fair(kyphotic, vc's upright posture)  Sitting - Static: Good  Sitting - Dynamic: Good  Standing - Static: Good  Standing - Dynamic: Good;-(w/RW)    EXERCISES    Other exercises?: Yes  Other exercises 1: Supine (R) LE ex x 10-15(AAROM with heel slides and SLR's)           Activity Tolerance: Patient Tolerated treatment well  PT Equipment Recommendations  Equipment Needed: No  Other Comments  Comments: pt talks entire tx session. Hard to keep pt moving and on task due to excessive talking. Patient Education  New Education Provided:    Learner:patient  Method: demonstration and explanation       Outcome: demonstrated understanding and needs reinforcement     Current Treatment Recommendations: Strengthening, ROM, Balance Training, Transfer Training, Gait Training, Stair training, Neuromuscular Re-education, Functional Mobility Training, Home Exercise Program, Safety Education & Training, Patient/Caregiver Education & Training, Wheelchair Mobility Training, Equipment Evaluation, Education, & procurement    Conditions Requiring Skilled Therapeutic Intervention  Body structures, Functions, Activity limitations: Decreased functional mobility ; Decreased ADL status; Decreased ROM; Decreased strength  Assessment: Impaired mobility due to L Hemiarthroplasty secondary to Hip Fx. Pt would benefit from additional PT to increase strength, endurance, and independence with functional mobility. Prognosis: Good  History: RA  REQUIRES PT FOLLOW UP: Yes  Discharge Recommendations: Home with assist PRN;Home with Home health PT    Goals  Short term goals  Time Frame for Short term goals: by 1 week  Short term goal 1: supervision bed mobility  Short term goal 2: transfers Min A from multiple surfaces  Short term goal 3: Ambulate 30 ft with RW in < 3 min   Short term goal 4: Amb up/down 5 stairs el HR mod A x 1  Short term goal 5: min A WC mobility 25 ft using BLE.   Long term goals  Time Frame for Long term goals : by discharge  Long term goal 1: Bed mobility Mod I  Long term goal 2: Transfers Mod I multiple surfaces  Long term goal 3: Ambulate 50-60 ft MI with RW  Long term goal 4: Amb up/down 5 steps el HR min A x 1  Long term goal 5: pt will be

## 2019-05-25 NOTE — PROGRESS NOTES
RN asked Dr. Abrahan Duenas if the patient could use her biofreeze spray from home if her  brought it in. Okay to use after verified by pharmacy.

## 2019-05-25 NOTE — PLAN OF CARE
Problem: Pain:  Goal: Pain level will decrease  Description  Pain level will decrease  5/25/2019 0750 by Corrie Mcghee RN  Outcome: Met This Shift  5/24/2019 1837 by Jonathan Seaman RN  Outcome: Ongoing  Goal: Control of acute pain  Description  Control of acute pain  5/25/2019 0750 by Corrie Mcghee RN  Outcome: Met This Shift  5/24/2019 1837 by Jonathan Seaman RN  Outcome: Ongoing  Goal: Control of chronic pain  Description  Control of chronic pain  5/25/2019 0750 by Corrie Mcghee RN  Outcome: Met This Shift  5/24/2019 1837 by Jonathan Seaman RN  Outcome: Ongoing     Problem: Risk for Impaired Skin Integrity  Goal: Tissue integrity - skin and mucous membranes  Description  Structural intactness and normal physiological function of skin and  mucous membranes.   5/25/2019 0750 by Corrie Mcghee RN  Outcome: Met This Shift  5/24/2019 1837 by Jonathan Seaman RN  Outcome: Ongoing     Problem: Falls - Risk of:  Goal: Will remain free from falls  Description  Will remain free from falls  5/25/2019 0750 by Corrie Mcghee RN  Outcome: Met This Shift  5/24/2019 1837 by Jonathan Seaman RN  Outcome: Ongoing  Goal: Absence of physical injury  Description  Absence of physical injury  5/25/2019 0750 by Corrie Mcghee RN  Outcome: Met This Shift  5/24/2019 1837 by Jonathan Seaman RN  Outcome: Ongoing     Problem: Nutrition  Goal: Optimal nutrition therapy  5/25/2019 0750 by Corrie Mcghee RN  Outcome: Met This Shift  5/24/2019 1837 by Jonathan Seaman RN  Outcome: Ongoing

## 2019-05-25 NOTE — PROGRESS NOTES
19 1435 19 1436   OT Individual Minutes   Time In [facility]   Time Out 0862 [id number] [facility]   ACUTE REHABILITATION OCCUPATIONAL THERAPY  DAILY NOTE    Date: 19  Patient Name: Nicki Ni      Room: 5033/7242-64    MRN: [id number]   : 1935  (80 y.o.)  Gender: female   Referring Practitioner: Robert Quinonez / Gila Sanford / Karyn Perez   (rehab)       Dr. 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 : (R hip hemiarthroplasty 19; L MARYANN)  Hip Precautions: No hip flexion > 90 degrees, No ADduction, No hip extension, No hip external rotation  Other position/activity restrictions: Posterolateral approach - no hip flexion greater than 90 degrees, no hip adduction across midline, no hip hyperextension, and no external rotation. Right Lower Extremity Weight Bearing: Weight Bearing As Tolerated  Required Braces or Orthoses?: No    Subjective   \"Can you not come early, the board says 2 pm I want my 10 minutes\"   Patient Currently in Pain: Yes  Pain Level: 7  Pain Location: Groin     Overall Orientation Status: Within Functional Limits     Pain Assessment  Pain Level: 7  Pain Type: Acute pain  Pain Location: Groin  Pain Orientation: Right    Objective Patient appears anxiety ridden and resistant to change when demonstrating different ways to get in and out of bed. Family ed provided with good carryover. Family expressed they would like to see patient get out of bed without handrails. Will address in future therapies. Late from PT and patient sitting on toilet 2 different times when attempting to complete OT sessions, thereby limiting minutes captured. Hyperverbal with therapist attempting to redirect.     Patient can sit on toilet anywhere from 20 to 30 minutes at a time. Ice placed on RLE to address pain. Continue POC         Bed mobility  Bridging: Stand by assistance  Sit to Supine: Supervision  Scooting: Supervision  Transfers  Stand Step Transfers: Supervision  Sit to stand: Modified independent  Stand to sit: Modified independent                                              OT FIM:   Bathin - Able to bathe all 10 areas with setup/sup/cues  Dressing-Upper: 5 - Requires setup/supervision/cues and/or requires assist with presthesis/brace only  Dressing-Lower: 0 - Did not occur(left patient sitting on toilet, frequently sits for 20 minutes to 1/2 hr at time )  Toiletin - Requires setup/supervision/cues  Shower Transfer: 0 - Activity does not occur(per patient does not shower )     Toilet xfer: Supervision           Assessment  Performance deficits / Impairments: Decreased functional mobility ; Decreased ADL status; Decreased ROM; Decreased strength;Decreased safe awareness;Decreased cognition;Decreased endurance;Decreased balance;Decreased high-level IADLs;Decreased coordination  Prognosis: Good  Activity Tolerance: Patient Tolerated treatment well  Safety Devices in place: Yes  Type of devices: Gait belt;Patient at risk for falls;Call light within reach;Nurse notified          Patient Education:  Patient Goals   Patient goals : To Return Home able to care for herself      Learner:patient  Method: explanation       Outcome: demonstrated understanding     Plan  Plan  Times per week: 900/7   Times per day: Twice a day  Current Treatment Recommendations: Self-Care / ADL, Balance Training, Functional Mobility Training, Endurance Training, Safety Education & Training, Patient/Caregiver Education & Training, Positioning, Strengthening, ROM, Cognitive Reorientation, Home Management Training  Plan Comment: continue OT  Patient Goals   Patient goals :  To Return Home able to care for herself   Short term goals  Time Frame for Short term goals: 1 week   Short term goal 1: Pt will V/D Good understanding of AE/DME/modified techniques for increased IND with self-care and mobility. Short term goal 2: Pt will actively participate in 10+ minutes of self-care to the best of Pt's ability to promote increased IND with self-care. Short term goal 3: Pt will perform bed mobility with Min A to sit EOB for increased participation with self-care. Short term goal 4: Pt will perform sit to stand transfer with Min A with RW with Good safety awareness. Short term goal 5: Pt will actively participate in 10-15 minutes in therapeutic exercise/functional activities to promote increased IND with self-care and mobility.   Long term goals  Time Frame for Long term goals : By Discharge   Long term goal 1: D/V understanding of Joint protection strategies with application to care needs   Long term goal 2: Mod I for basic premorbid self care tasks    Long term goal 3: Tolerate standing for 10 minutes for self care and home management tasks        Electronically signed by DENISSE Acosta on 5/25/19 at 2:37 PM

## 2019-05-26 LAB
GLUCOSE BLD-MCNC: 109 MG/DL (ref 65–105)
GLUCOSE BLD-MCNC: 135 MG/DL (ref 65–105)
GLUCOSE BLD-MCNC: 144 MG/DL (ref 65–105)
VITAMIN D 25-HYDROXY: 13.6 NG/ML (ref 30–100)

## 2019-05-26 PROCEDURE — 1180000000 HC REHAB R&B

## 2019-05-26 PROCEDURE — 99231 SBSQ HOSP IP/OBS SF/LOW 25: CPT | Performed by: INTERNAL MEDICINE

## 2019-05-26 PROCEDURE — 82306 VITAMIN D 25 HYDROXY: CPT

## 2019-05-26 PROCEDURE — 6370000000 HC RX 637 (ALT 250 FOR IP): Performed by: ORTHOPAEDIC SURGERY

## 2019-05-26 PROCEDURE — 6370000000 HC RX 637 (ALT 250 FOR IP): Performed by: INTERNAL MEDICINE

## 2019-05-26 PROCEDURE — 99232 SBSQ HOSP IP/OBS MODERATE 35: CPT | Performed by: PHYSICAL MEDICINE & REHABILITATION

## 2019-05-26 PROCEDURE — 82947 ASSAY GLUCOSE BLOOD QUANT: CPT

## 2019-05-26 PROCEDURE — 6370000000 HC RX 637 (ALT 250 FOR IP): Performed by: PHYSICAL MEDICINE & REHABILITATION

## 2019-05-26 PROCEDURE — 94761 N-INVAS EAR/PLS OXIMETRY MLT: CPT

## 2019-05-26 PROCEDURE — 94640 AIRWAY INHALATION TREATMENT: CPT

## 2019-05-26 PROCEDURE — 36415 COLL VENOUS BLD VENIPUNCTURE: CPT

## 2019-05-26 RX ORDER — ERGOCALCIFEROL 1.25 MG/1
50000 CAPSULE ORAL WEEKLY
Status: DISCONTINUED | OUTPATIENT
Start: 2019-05-26 | End: 2019-06-04 | Stop reason: HOSPADM

## 2019-05-26 RX ADMIN — LORAZEPAM 0.5 MG: 0.5 TABLET ORAL at 07:19

## 2019-05-26 RX ADMIN — PREDNISONE 10 MG: 10 TABLET ORAL at 09:55

## 2019-05-26 RX ADMIN — LORAZEPAM 0.5 MG: 0.5 TABLET ORAL at 22:14

## 2019-05-26 RX ADMIN — LORAZEPAM 0.5 MG: 0.5 TABLET ORAL at 13:42

## 2019-05-26 RX ADMIN — RIVAROXABAN 10 MG: 10 TABLET, FILM COATED ORAL at 19:37

## 2019-05-26 RX ADMIN — OXYCODONE HYDROCHLORIDE AND ACETAMINOPHEN 1 TABLET: 5; 325 TABLET ORAL at 23:29

## 2019-05-26 RX ADMIN — OXYCODONE HYDROCHLORIDE AND ACETAMINOPHEN 1 TABLET: 5; 325 TABLET ORAL at 19:38

## 2019-05-26 RX ADMIN — LOSARTAN POTASSIUM 25 MG: 25 TABLET ORAL at 09:55

## 2019-05-26 RX ADMIN — OXYCODONE HYDROCHLORIDE AND ACETAMINOPHEN 1 TABLET: 5; 325 TABLET ORAL at 15:14

## 2019-05-26 RX ADMIN — ERGOCALCIFEROL 50000 UNITS: 1.25 CAPSULE ORAL at 19:37

## 2019-05-26 RX ADMIN — METOPROLOL TARTRATE 50 MG: 50 TABLET ORAL at 19:38

## 2019-05-26 RX ADMIN — IPRATROPIUM BROMIDE AND ALBUTEROL SULFATE 1 AMPULE: .5; 3 SOLUTION RESPIRATORY (INHALATION) at 16:29

## 2019-05-26 RX ADMIN — PANTOPRAZOLE SODIUM 40 MG: 40 TABLET, DELAYED RELEASE ORAL at 07:19

## 2019-05-26 RX ADMIN — IPRATROPIUM BROMIDE AND ALBUTEROL SULFATE 1 AMPULE: .5; 3 SOLUTION RESPIRATORY (INHALATION) at 08:56

## 2019-05-26 RX ADMIN — METOPROLOL TARTRATE 50 MG: 50 TABLET ORAL at 09:55

## 2019-05-26 RX ADMIN — LEVOTHYROXINE SODIUM 50 MCG: 50 TABLET ORAL at 07:19

## 2019-05-26 RX ADMIN — OXYCODONE HYDROCHLORIDE AND ACETAMINOPHEN 1 TABLET: 5; 325 TABLET ORAL at 09:59

## 2019-05-26 RX ADMIN — OXYCODONE HYDROCHLORIDE AND ACETAMINOPHEN 1 TABLET: 5; 325 TABLET ORAL at 04:18

## 2019-05-26 ASSESSMENT — PAIN DESCRIPTION - PAIN TYPE
TYPE: ACUTE PAIN
TYPE: ACUTE PAIN
TYPE: ACUTE PAIN;SURGICAL PAIN

## 2019-05-26 ASSESSMENT — PAIN SCALES - GENERAL
PAINLEVEL_OUTOF10: 6
PAINLEVEL_OUTOF10: 4
PAINLEVEL_OUTOF10: 6
PAINLEVEL_OUTOF10: 5
PAINLEVEL_OUTOF10: 7
PAINLEVEL_OUTOF10: 4
PAINLEVEL_OUTOF10: 6

## 2019-05-26 ASSESSMENT — PAIN DESCRIPTION - ORIENTATION
ORIENTATION: RIGHT
ORIENTATION: RIGHT

## 2019-05-26 ASSESSMENT — PAIN DESCRIPTION - LOCATION
LOCATION: HIP
LOCATION: HIP;KNEE

## 2019-05-26 ASSESSMENT — PAIN DESCRIPTION - DESCRIPTORS
DESCRIPTORS: ACHING
DESCRIPTORS: ACHING;CRAMPING

## 2019-05-26 ASSESSMENT — PAIN DESCRIPTION - FREQUENCY: FREQUENCY: CONTINUOUS

## 2019-05-26 NOTE — PLAN OF CARE
Problem: Pain:  Goal: Pain level will decrease  Description  Pain level will decrease  5/26/2019 0517 by Minal Gibbons RN  Outcome: Met This Shift  5/25/2019 1755 by Angie Stinson RN  Outcome: Met This Shift  Goal: Control of acute pain  Description  Control of acute pain  Outcome: Met This Shift  Goal: Control of chronic pain  Description  Control of chronic pain  Outcome: Met This Shift     Problem: Risk for Impaired Skin Integrity  Goal: Tissue integrity - skin and mucous membranes  Description  Structural intactness and normal physiological function of skin and  mucous membranes.   5/26/2019 0517 by Minal Gibbons RN  Outcome: Met This Shift  5/25/2019 1755 by Angie Stinson RN  Outcome: Met This Shift     Problem: Falls - Risk of:  Goal: Will remain free from falls  Description  Will remain free from falls  5/26/2019 0517 by Minal Gibbons RN  Outcome: Met This Shift  5/25/2019 1755 by Angie Stinson RN  Outcome: Met This Shift  Goal: Absence of physical injury  Description  Absence of physical injury  Outcome: Met This Shift     Problem: Nutrition  Goal: Optimal nutrition therapy  Outcome: Met This Shift

## 2019-05-26 NOTE — PROGRESS NOTES
Anemia, Anxiety, Asthma, Cataract, COPD (chronic obstructive pulmonary disease) (HCC), CTS (carpal tunnel syndrome), Esophageal reflux, Headache(784.0), Hyperlipidemia, Hypertension, Hypothyroidism, Osteoarthritis, Osteoporosis, and Rheumatoid arthritis(714.0). Social History:   reports that she has never smoked. She has never used smokeless tobacco. She reports that she does not drink alcohol or use drugs. Family History:   Family History   Problem Relation Age of Onset   [de-identified] Cancer Mother     Hypertension Mother     Heart Disease Mother     Stroke Maternal Grandmother     Heart Disease Maternal Grandmother     Cancer Paternal Grandmother     Heart Disease Paternal Grandmother     Heart Disease Father     Heart Disease Maternal Grandfather     Heart Disease Paternal Grandfather        Vitals:  /60   Pulse 90   Temp 98.1 °F (36.7 °C) (Oral)   Resp 16   Ht 4' 11\" (1.499 m)   Wt 130 lb (59 kg)   SpO2 98%   BMI 26.26 kg/m²   Temp (24hrs), Av.1 °F (36.7 °C), Min:97.9 °F (36.6 °C), Max:98.2 °F (36.8 °C)    Recent Labs     19  1117   POCGLU 109*       I/O (24Hr):   No intake or output data in the 24 hours ending 19 1453    Labs:    [unfilled]    Lab Results   Component Value Date/Time    SPECIAL NOT REPORTED 2016 09:16 PM     Lab Results   Component Value Date/Time    CULTURE NORMAL URO-GENITAL SELVIN 2016 09:16 PM    CULTURE NEGATIVE FOR NEISSERIA GONORRHOEAE 2016 09:16 PM    CULTURE  2016 09:16 PM     [employer] [address] [address] [phone]       [facility]    Radiology:      Physical Examination:        General appearance:  alert, cooperative and no distress, wheel chair Bound   Mental Status:  oriented to person, place and time and normal affect  Lungs:  clear to auscultation bilaterally, normal effort  Heart:  regular rate and rhythm, no murmur  Abdomen:  soft, nontender, nondistended, normal bowel sounds, no masses,

## 2019-05-26 NOTE — PROGRESS NOTES
up    Surface: level tile  Ambulation 1  Surface: level tile  Device: Rolling Walker  Assistance: Contact guard assistance  Quality of Gait: slow pace, steady step through pattern  Gait Deviations: Slow Diane  Distance: 100ft x 2  Comments: seated rest break between walks. VC's to look up    OT:            Balance  Sitting Balance: Supervision  Standing Balance: Supervision(8 to 10 minutes x 2 times during session )   Standing Balance  Activity: Pt stands at sink 10 minutes to brush her teeth at sink. Pt stands several times getting up/down from toilet. Pt stands w CGA/min assist when she completes ana care/bottom care. Pt stands for lower body clothing management to pull up underpants/pj bottoms. Pt stands during transfers. Functional Mobility  Functional - Mobility Device: Rolling Walker  Activity: To/from bathroom  Assist Level: Minimal assistance  Functional Mobility Comments: Verbal cues using RW to ambulate to bathroom. Pt ambulates using RW  to /from toilet w min assist.      Bed mobility  Bridging: Stand by assistance  Supine to Sit: Minimal assistance  Sit to Supine: Supervision  Scooting: Supervision  Transfers  Stand Step Transfers: Supervision  Sit to stand: Modified independent  Stand to sit: Modified independent   Toilet Transfers  Toilet - Technique: Ambulating(Pt uses RW)  Equipment Used: (Rolling shower chair is on top of standard toilet.)  Toilet Transfer: Minimal assistance             Objective:  BP (!) 129/55   Pulse 91   Temp 98.2 °F (36.8 °C) (Oral)   Resp 16   Ht 4' 11\" (1.499 m)   Wt 130 lb (59 kg)   SpO2 96%   BMI 26.26 kg/m²       Alert, no distress. Good speech and language function. Lungs clear, no wheezing. Heart regular. Abdomen - soft, positive bowel sounds, non-distended, non-tender. No calf tenderness or edema. Incision site with Aquacel in place no warmth around dressing.   Small discharge    Diagnostics:     CBC:   No results for input(s): WBC, RBC, HGB, HCT, MCV, right hip fracture status post hemiarthroplasty    1. Continue acute inpatient rehab to work on transfers, self-care, ADLs, ambulation. Patient doing well. Seen walking with nurse's aide to bathroom using rolling walker. 2. Orthopedics-right subcapital femoral fracture with hemiarthroplasty. 3. Rheumatology-history of rheumatoid arthritis on prednisone. 4. Hematology-postop anemia. Hemoglobin 10.1 on 5/23/2019. Patient is asymptomatic. 5. Hypertension-being monitored by internal medicine patient is currently on Cozaar and Lopressor. 6. Endocrine-patient with history of hypothyroidism on Synthroid. 7. Pulmonary- history of asthma and COPD. On DuoNeb treatments. 8. GI-on Protonix, GlycoLax, and Colace. 9. DVT prophylaxis-on Xarelto. 10. Internal medicine input appreciated. Electronically signed by Maddie Hernandez MD on 5/26/2019 at 10:48 AM      This note is created with the assistance of a speech recognition program.  While intending to generate a document that actually reflects the content of the visit, the document can still have some errors including those of syntax and sound a like substitutions which may escape proof reading.   In such instances, actual meaning can be extrapolated by contextual diversion.

## 2019-05-26 NOTE — PROGRESS NOTES
[place] [address] Internal Medicine    Progress Note    5/25/2019    10:21 PM    Name:   Rafael Silva  MRN:     [id number]     [place]:      [de-identified]   Room:   [address] [place] [address] Day:  4  Admit Date:  5/21/2019  6:04 PM    PCP:   Anila Varela MD  Code Status:  Full Code    Subjective:     C/C: No chief complaint on file. Interval History Status: improved. HPI:   Patient has history of rheumatoid arthritis, chronically on steroids, osteoporosis. Patient had right hip fracture, after she sustained a fall.  status post surgery, doing better    Review of Systems:     Constitutional:  negative for chills, fevers, sweats  Respiratory:  negative for cough, dyspnea on exertion, hemoptysis, shortness of breath, wheezing  Cardiovascular:  negative for chest pain, chest pressure/discomfort, lower extremity edema, palpitations  Gastrointestinal:  negative for abdominal pain, constipation, diarrhea, nausea, vomiting  Neurological:  negative for dizziness, headache  Extremity - Pain in Right Hip area   Medications: Allergies:     Allergies   Allergen Reactions    No Known Allergies      Other reaction(s): Unknown    Seasonal Other (See Comments)     Congestion       Current Meds:   Scheduled Meds:    sennosides-docusate sodium  1 tablet Oral BID    lidocaine  1 patch Transdermal Daily    rivaroxaban  10 mg Oral Daily    ipratropium-albuterol  1 ampule Inhalation Q4H WA    levothyroxine  50 mcg Oral Daily    lidocaine  1 patch Transdermal Daily    losartan  25 mg Oral Daily    metoprolol tartrate  50 mg Oral BID    pantoprazole  40 mg Oral QAM AC    polyethylene glycol  17 g Oral Daily    predniSONE  10 mg Oral Daily     Continuous Infusions:   PRN Meds: aluminum & magnesium hydroxide-simethicone, bisacodyl, magnesium hydroxide, acetaminophen, LORazepam, oxyCODONE-acetaminophen    Data:     Past Medical History:   has a past medical history of Allergic rhinitis, Anemia, Anxiety, Asthma, Cataract, COPD (chronic obstructive pulmonary disease) ([address] CTS (carpal tunnel syndrome), Esophageal reflux, Headache(784.0), Hyperlipidemia, Hypertension, Hypothyroidism, Osteoarthritis, Osteoporosis, and Rheumatoid arthritis(714.0). Social History:   reports that she has never smoked. She has never used smokeless tobacco. She reports that she does not drink alcohol or use drugs. Family History:   Family History   Problem Relation Age of Onset   [facility] Cancer Mother     Hypertension Mother     Heart Disease Mother     Stroke Maternal Grandmother     Heart Disease Maternal Grandmother     Cancer Paternal Grandmother     Heart Disease Paternal Grandmother     Heart Disease Father     Heart Disease Maternal Grandfather     Heart Disease Paternal Grandfather        Vitals:  BP (!) 120/51   Pulse 92   Temp 97.9 °F (36.6 °C) (Oral)   Resp 18   Ht 4' 11\" (1.499 m)   Wt 130 lb (59 kg)   SpO2 94%   BMI 26.26 kg/m²   Temp (24hrs), Av.1 °F (36.7 °C), Min:97.9 °F (36.6 °C), Max:98.3 °F (36.8 °C)    No results for input(s): POCGLU in the last 72 hours. I/O (24Hr):     Intake/Output Summary (Last 24 hours) at 20191  Last data filed at 2019 1250  Gross per 24 hour   Intake 200 ml   Output 125 ml   Net 75 ml       Labs:    [unfilled]    Lab Results   Component Value Date/Time    SPECIAL NOT REPORTED 2016 09:16 PM     Lab Results   Component Value Date/Time    CULTURE NORMAL URO-GENITAL SELVIN 2016 09:16 PM    CULTURE NEGATIVE FOR NEISSERIA GONORRHOEAE 2016 09:16 PM    CULTURE  2016 09:16 PM     [employer] [address] [facility], [address] [place] [address] [phone]       [facility]    Radiology:      Physical Examination:        General appearance:  alert, cooperative and no distress, wheel chair Bound   Mental Status:  oriented to person, place and time and normal affect  Lungs:  clear to auscultation bilaterally, normal effort  Heart:  regular rate and rhythm, no murmur  Abdomen:  soft, nontender, nondistended, normal bowel sounds, no masses, hepatomegaly, splenomegaly  Extremities:  no edema, redness, tenderness in the calves  Skin:  no gross lesions, rashes, induration    Assessment:        Primary Problem  <principal problem not specified>    Active Hospital Problems    Diagnosis Date Noted    S/P right hip fracture [Z87.81] 05/22/2019    History of hemiarthroplasty of right hip [Z96.641] 05/21/2019    [facility] [M06.9] 12/01/2014    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 Right hip fracture status post surgery  2. Rheumatoid arthritis, on steroids her home dose of prednisone was started  3. Patient is on DVT prophylaxis with Xarelto  4. Hypertension, controlled  5.  Hypothyroidism, patient is on Synthroid    5/25  Doing better   Wheel Chair Bound   On [facility] Tate Newton MD  5/25/2019  10:21 PM

## 2019-05-27 LAB
ANION GAP SERPL CALCULATED.3IONS-SCNC: 12 MMOL/L (ref 9–17)
BUN BLDV-MCNC: 16 MG/DL (ref 8–23)
BUN/CREAT BLD: ABNORMAL (ref 9–20)
CALCIUM SERPL-MCNC: 9 MG/DL (ref 8.6–10.4)
CHLORIDE BLD-SCNC: 103 MMOL/L (ref 98–107)
CO2: 24 MMOL/L (ref 20–31)
CREAT SERPL-MCNC: <0.4 MG/DL (ref 0.5–0.9)
GFR AFRICAN AMERICAN: ABNORMAL ML/MIN
GFR NON-AFRICAN AMERICAN: ABNORMAL ML/MIN
GFR SERPL CREATININE-BSD FRML MDRD: ABNORMAL ML/MIN/{1.73_M2}
GFR SERPL CREATININE-BSD FRML MDRD: ABNORMAL ML/MIN/{1.73_M2}
GLUCOSE BLD-MCNC: 111 MG/DL (ref 65–105)
GLUCOSE BLD-MCNC: 168 MG/DL (ref 65–105)
GLUCOSE BLD-MCNC: 96 MG/DL (ref 70–99)
HCT VFR BLD CALC: 31.9 % (ref 36–46)
HEMOGLOBIN: 10.2 G/DL (ref 12–16)
MCH RBC QN AUTO: 26.8 PG (ref 26–34)
MCHC RBC AUTO-ENTMCNC: 31.8 G/DL (ref 31–37)
MCV RBC AUTO: 84.1 FL (ref 80–100)
NRBC AUTOMATED: ABNORMAL PER 100 WBC
PDW BLD-RTO: 17 % (ref 11.5–14.9)
PLATELET # BLD: 342 K/UL (ref 150–450)
PMV BLD AUTO: 6.8 FL (ref 6–12)
POTASSIUM SERPL-SCNC: 3.5 MMOL/L (ref 3.7–5.3)
RBC # BLD: 3.8 M/UL (ref 4–5.2)
SODIUM BLD-SCNC: 139 MMOL/L (ref 135–144)
WBC # BLD: 6.5 K/UL (ref 3.5–11)

## 2019-05-27 PROCEDURE — 36415 COLL VENOUS BLD VENIPUNCTURE: CPT

## 2019-05-27 PROCEDURE — 6370000000 HC RX 637 (ALT 250 FOR IP): Performed by: ORTHOPAEDIC SURGERY

## 2019-05-27 PROCEDURE — 6370000000 HC RX 637 (ALT 250 FOR IP): Performed by: PHYSICAL MEDICINE & REHABILITATION

## 2019-05-27 PROCEDURE — 80048 BASIC METABOLIC PNL TOTAL CA: CPT

## 2019-05-27 PROCEDURE — 94640 AIRWAY INHALATION TREATMENT: CPT

## 2019-05-27 PROCEDURE — 94761 N-INVAS EAR/PLS OXIMETRY MLT: CPT

## 2019-05-27 PROCEDURE — 97110 THERAPEUTIC EXERCISES: CPT

## 2019-05-27 PROCEDURE — 97535 SELF CARE MNGMENT TRAINING: CPT

## 2019-05-27 PROCEDURE — 85027 COMPLETE CBC AUTOMATED: CPT

## 2019-05-27 PROCEDURE — 82947 ASSAY GLUCOSE BLOOD QUANT: CPT

## 2019-05-27 PROCEDURE — 97530 THERAPEUTIC ACTIVITIES: CPT

## 2019-05-27 PROCEDURE — 97116 GAIT TRAINING THERAPY: CPT

## 2019-05-27 PROCEDURE — 1180000000 HC REHAB R&B

## 2019-05-27 PROCEDURE — 99231 SBSQ HOSP IP/OBS SF/LOW 25: CPT | Performed by: INTERNAL MEDICINE

## 2019-05-27 PROCEDURE — 99232 SBSQ HOSP IP/OBS MODERATE 35: CPT | Performed by: PHYSICAL MEDICINE & REHABILITATION

## 2019-05-27 RX ADMIN — METOPROLOL TARTRATE 50 MG: 50 TABLET ORAL at 09:56

## 2019-05-27 RX ADMIN — LOSARTAN POTASSIUM 25 MG: 25 TABLET ORAL at 09:55

## 2019-05-27 RX ADMIN — SENNOSIDES,DOCUSATE SODIUM 1 TABLET: 8.6; 5 TABLET, FILM COATED ORAL at 20:04

## 2019-05-27 RX ADMIN — IPRATROPIUM BROMIDE AND ALBUTEROL SULFATE 1 AMPULE: .5; 3 SOLUTION RESPIRATORY (INHALATION) at 15:31

## 2019-05-27 RX ADMIN — LORAZEPAM 0.5 MG: 0.5 TABLET ORAL at 12:04

## 2019-05-27 RX ADMIN — METOPROLOL TARTRATE 50 MG: 50 TABLET ORAL at 20:16

## 2019-05-27 RX ADMIN — LEVOTHYROXINE SODIUM 50 MCG: 50 TABLET ORAL at 06:12

## 2019-05-27 RX ADMIN — IPRATROPIUM BROMIDE AND ALBUTEROL SULFATE 1 AMPULE: .5; 3 SOLUTION RESPIRATORY (INHALATION) at 20:28

## 2019-05-27 RX ADMIN — SENNOSIDES,DOCUSATE SODIUM 1 TABLET: 8.6; 5 TABLET, FILM COATED ORAL at 09:56

## 2019-05-27 RX ADMIN — PANTOPRAZOLE SODIUM 40 MG: 40 TABLET, DELAYED RELEASE ORAL at 06:12

## 2019-05-27 RX ADMIN — RIVAROXABAN 10 MG: 10 TABLET, FILM COATED ORAL at 18:10

## 2019-05-27 RX ADMIN — OXYCODONE HYDROCHLORIDE AND ACETAMINOPHEN 1 TABLET: 5; 325 TABLET ORAL at 22:42

## 2019-05-27 RX ADMIN — OXYCODONE HYDROCHLORIDE AND ACETAMINOPHEN 1 TABLET: 5; 325 TABLET ORAL at 18:09

## 2019-05-27 RX ADMIN — OXYCODONE HYDROCHLORIDE AND ACETAMINOPHEN 1 TABLET: 5; 325 TABLET ORAL at 14:27

## 2019-05-27 RX ADMIN — PREDNISONE 10 MG: 10 TABLET ORAL at 09:55

## 2019-05-27 RX ADMIN — OXYCODONE HYDROCHLORIDE AND ACETAMINOPHEN 1 TABLET: 5; 325 TABLET ORAL at 06:12

## 2019-05-27 RX ADMIN — LORAZEPAM 0.5 MG: 0.5 TABLET ORAL at 20:04

## 2019-05-27 RX ADMIN — OXYCODONE HYDROCHLORIDE AND ACETAMINOPHEN 1 TABLET: 5; 325 TABLET ORAL at 10:03

## 2019-05-27 ASSESSMENT — PAIN SCALES - GENERAL
PAINLEVEL_OUTOF10: 7
PAINLEVEL_OUTOF10: 5
PAINLEVEL_OUTOF10: 7
PAINLEVEL_OUTOF10: 6
PAINLEVEL_OUTOF10: 0
PAINLEVEL_OUTOF10: 7
PAINLEVEL_OUTOF10: 3
PAINLEVEL_OUTOF10: 7
PAINLEVEL_OUTOF10: 5
PAINLEVEL_OUTOF10: 5

## 2019-05-27 ASSESSMENT — PAIN DESCRIPTION - DESCRIPTORS: DESCRIPTORS: ACHING;CRAMPING;SHARP

## 2019-05-27 ASSESSMENT — PAIN DESCRIPTION - PAIN TYPE: TYPE: ACUTE PAIN;SURGICAL PAIN

## 2019-05-27 ASSESSMENT — PAIN DESCRIPTION - LOCATION: LOCATION: HIP;KNEE

## 2019-05-27 ASSESSMENT — PAIN DESCRIPTION - ORIENTATION: ORIENTATION: RIGHT

## 2019-05-27 ASSESSMENT — PAIN DESCRIPTION - FREQUENCY: FREQUENCY: CONTINUOUS

## 2019-05-27 NOTE — PROGRESS NOTES
Approximately at this time, writer walked into room to offer patient an ativan before bedtime. Patient was not in bed, but in the bathroom, alone on the toilet. Patient very tearful, stating that the PCT left her in the bathroom alone after she told him to. She also said she was out of toilet paper, but she appeared anxious and frightened. Said for nurse to close the bathroom door so nobody else could hear her get upset. She said the (male) PCT upset her because when he walked her into the bathroom, he stood behind her and her gown opened up in the back because it won't tie. Offered new gown, which patient declined. Said the blinds were open in the window, and her gown was open in the back. Became very tearful , stating that \"men do bad things to women\". Assisted her back to bed and positioned for comfort. Medicated with ativan. Call light within reach. Instructed male PCT not to go into room anymore tonight.

## 2019-05-27 NOTE — PROGRESS NOTES
go up and down one flight of stairs  GOAL: Stairs: 2       BALANCE Posture: Fair(kyphotic, vc's upright posture)  Sitting - Static: Good  Sitting - Dynamic: Good  Standing - Static: Good  Standing - Dynamic: Good;-(w/RW)  Comments: standing with & without RW    EXERCISES    Other exercises?: Yes  Other exercises 1: static stand without UE support 2 minute bouts (AAROM with heel slides and SLR's)  Other exercises 2: seated 1.5# and lt green x 15(no hip fleion)  Other exercises 3: supine bilateral LE x 10(pt C/O right groin pain increased hip extension)           Activity Tolerance: Patient Tolerated treatment well  PT Equipment Recommendations  Equipment Needed: No  Other Comments  Comments: pt talks entire tx session. Hard to keep pt moving and on task due to excessive talking. Patient Education  New Education Provided:    Learner:patient  Method: explanation       Outcome: needs reinforcement     Current Treatment Recommendations: Strengthening, ROM, Balance Training, Transfer Training, Gait Training, Stair training, Neuromuscular Re-education, Functional Mobility Training, Home Exercise Program, Safety Education & Training, Patient/Caregiver Education & Training, Wheelchair Mobility Training, Equipment Evaluation, Education, & procurement    Conditions Requiring Skilled Therapeutic Intervention  Body structures, Functions, Activity limitations: Decreased functional mobility ; Decreased ADL status; Decreased ROM; Decreased strength  Assessment: Impaired mobility due to L Hemiarthroplasty secondary to Hip Fx. Pt would benefit from additional PT to increase strength, endurance, and independence with functional mobility.    Prognosis: Good  REQUIRES PT FOLLOW UP: Yes  Discharge Recommendations: Home with assist PRN;Home with Home health PT    Goals  Short term goals  Time Frame for Short term goals: by 1 week  Short term goal 1: supervision bed mobility  Short term goal 2: transfers Min A from multiple surfaces  Short term goal 3: Ambulate 30 ft with RW in < 3 min   Short term goal 4: Amb up/down 5 stairs el HR mod A x 1  Short term goal 5: min A WC mobility 25 ft using BLE.   Long term goals  Time Frame for Long term goals : by discharge  Long term goal 1: Bed mobility Mod I  Long term goal 2: Transfers Mod I multiple surfaces  Long term goal 3: Ambulate 50-60 ft MI with RW  Long term goal 4: Amb up/down 5 steps el HR min A x 1  Long term goal 5: pt will be indep with wc mobility 50 ft    Electronically signed by Harjinder Barker PTA on 5/27/19 at 2:23 PM     05/27/19 1146 05/27/19 1422   PT Individual Minutes   Time In [address] [place] [address]   Time Out 0996 [id number]   Minutes 67 35

## 2019-05-27 NOTE — PROGRESS NOTES
19 1128 19 1456   OT Individual Minutes   Time In 1100 1420   Time Out [address] [place] [address] REHABILITATION OCCUPATIONAL THERAPY  DAILY NOTE    Date: 19  Patient Name: Ralf Ortiz      Room: 3891/9495-63    MRN: [id number]   : 1935  (80 y.o.)  Gender: female   Referring Practitioner: Robert Hare / Erin Wagoner / Jocelin Nance   (rehab)       Dr. 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 : (R hip hemiarthroplasty 19; L MARYANN)  Hip Precautions: No hip flexion > 90 degrees, No ADduction, No hip extension, No hip external rotation  Other position/activity restrictions: Posterolateral approach - no hip flexion greater than 90 degrees, no hip adduction across midline, no hip hyperextension, and no external rotation. Right Lower Extremity Weight Bearing: Weight Bearing As Tolerated  Required Braces or Orthoses?: No    Subjective \"and its stuck up on the side but when I start walking it shakes loose and I gotta go\"               Pain Assessment  Pain Level: 7  Pain Type: Acute pain  Pain Location: Groin  Pain Orientation: Right    Objective Not all time captured this date due to sitting 20 minutes on toilet during AM tx and PM transferring to toilet Mod I.  Nsg aware pt on toilet.  present during PM session and states he was not getting her clothes or helping her at home but patient very reliant on  and his presence.   Continue POC                                                        OT FIM:   Groomin - Requires setup/cues to do all tasks(may need assist with opening/closing groom items )  Bathin - Able to bathe all 10 areas with setup/sup/cues  Dressing-Upper: 6 - Independent with device/prosthesis(suggested patient awareness. Short term goal 5: Pt will actively participate in 10-15 minutes in therapeutic exercise/functional activities to promote increased IND with self-care and mobility.   Long term goals  Time Frame for Long term goals : By Discharge   Long term goal 1: D/V understanding of Joint protection strategies with application to care needs   Long term goal 2: Mod I for basic premorbid self care tasks    Long term goal 3: Tolerate standing for 10 minutes for self care and home management tasks        Electronically signed by DENISSE Abrams on 5/27/19 at 2:58 PM

## 2019-05-27 NOTE — PROGRESS NOTES
Physical Medicine & Rehabilitation  Progress Note    Chief complaint:  ADL and mobility dysfunction secondary to right hip fracture    Subjective:      Patient is without new complaints. Positive bowel movement on Saturday. Pain 6/10 in her knees-old from arthritis. ROS:  Denies fevers, chills, sweats. No chest pain, palpitations, lightheadedness. Denies coughing, wheezing or shortness of breath. Denies abdominal pain, nausea, diarrhea or constipation. No new areas of joint pain. Denies new areas of numbness or weakness. Denies new anxiety or depression issues. No new skin problems. Rehabilitation:   Progressing in therapies. PT:  Restrictions/Precautions: Fall Risk, Weight Bearing  Implants present? : (R hip hemiarthroplasty 5/17/19; L MARYANN)  Hip Precautions: No hip flexion > 90 degrees, No ADduction, No hip extension, No hip external rotation  Other position/activity restrictions: Posterolateral approach - no hip flexion greater than 90 degrees, no hip adduction across midline, no hip hyperextension, and no external rotation. Right Lower Extremity Weight Bearing: Weight Bearing As Tolerated   Transfers  Sit to Stand: Contact guard assistance  Stand to sit: Contact guard assistance  Bed to Chair: Contact guard assistance  WB Status: WBAT R  Ambulation 1  Surface: level tile  Device: Rolling Walker  Assistance: Contact guard assistance  Quality of Gait: slow pace, steady step through pattern  Gait Deviations: Slow Diane  Distance: 100ft x 2  Comments: seated rest break between walks.  VC's to look up    Transfers  Sit to Stand: Contact guard assistance  Stand to sit: Contact guard assistance  Bed to Chair: Contact guard assistance  Ambulation  Ambulation?: Yes  WB Status: WBAT R  Ambulation 1  Surface: level tile  Device: Rolling Walker  Assistance: Contact guard assistance  Quality of Gait: slow pace, steady step through pattern  Gait Deviations: Slow Diane  Distance: 100ft x 2  Comments: seated rest break between walks. VC's to look up    Surface: level tile  Ambulation 1  Surface: level tile  Device: Rolling Walker  Assistance: Contact guard assistance  Quality of Gait: slow pace, steady step through pattern  Gait Deviations: Slow Diane  Distance: 100ft x 2  Comments: seated rest break between walks. VC's to look up    OT:            Balance  Sitting Balance: Supervision  Standing Balance: Supervision(8 to 10 minutes x 2 times during session )   Standing Balance  Activity: Pt stands at sink 10 minutes to brush her teeth at sink. Pt stands several times getting up/down from toilet. Pt stands w CGA/min assist when she completes ana care/bottom care. Pt stands for lower body clothing management to pull up underpants/pj bottoms. Pt stands during transfers. Functional Mobility  Functional - Mobility Device: Rolling Walker  Activity: To/from bathroom  Assist Level: Minimal assistance  Functional Mobility Comments: Verbal cues using RW to ambulate to bathroom. Pt ambulates using RW  to /from toilet w min assist.      Bed mobility  Bridging: Stand by assistance  Supine to Sit: Minimal assistance  Sit to Supine: Supervision  Scooting: Supervision  Transfers  Stand Step Transfers: Supervision  Sit to stand: Modified independent  Stand to sit: Modified independent   Toilet Transfers  Toilet - Technique: Ambulating(Pt uses RW)  Equipment Used: (Rolling shower chair is on top of standard toilet.)  Toilet Transfer: Minimal assistance             Objective:  BP (!) 133/55   Pulse 81   Temp 97.7 °F (36.5 °C) (Oral)   Resp 16   Ht 4' 11\" (1.499 m)   Wt 130 lb (59 kg)   SpO2 96%   BMI 26.26 kg/m²       Alert, no distress. Good speech and language function. Lungs clear, no wheezing. Heart regular. Abdomen - soft, positive bowel sounds, non-distended, non-tender. No calf tenderness or edema. No warmth or tenderness in bilateral knees to palpation along joint lines.   Incision site with Aquacel in place no warmth around dressing. Small discharge    Diagnostics:     CBC:   Recent Labs     05/27/19  0625   WBC 6.5   RBC 3.80*   HGB 10.2*   HCT 31.9*   MCV 84.1   RDW 17.0*        BMP:   Recent Labs     05/27/19  0625      K 3.5*      CO2 24   BUN 16   CREATININE <0.40*     BNP: No results for input(s): BNP in the last 72 hours. PT/INR: No results for input(s): PROTIME, INR in the last 72 hours. APTT: No results for input(s): APTT in the last 72 hours. CARDIAC ENZYMES: No results for input(s): CKMB, CKMBINDEX, TROPONINT in the last 72 hours. Invalid input(s): CKTOTAL;3  FASTING LIPID PANEL:No results found for: CHOL, HDL, TRIG  LIVER PROFILE: No results for input(s): AST, ALT, ALB, BILIDIR, BILITOT, ALKPHOS in the last 72 hours.      Current Medications:   Current Facility-Administered Medications: vitamin D (ERGOCALCIFEROL) capsule 50,000 Units, 50,000 Units, Oral, Weekly  aluminum & magnesium hydroxide-simethicone (MAALOX) 200-200-20 MG/5ML suspension 30 mL, 30 mL, Oral, Q6H PRN  sennosides-docusate sodium (SENOKOT-S) 8.6-50 MG tablet 1 tablet, 1 tablet, Oral, BID  bisacodyl (DULCOLAX) suppository 10 mg, 10 mg, Rectal, Daily PRN  magnesium hydroxide (MILK OF MAGNESIA) 400 MG/5ML suspension 30 mL, 30 mL, Oral, Daily PRN  lidocaine 4 % external patch 1 patch, 1 patch, Transdermal, Daily  acetaminophen (TYLENOL) tablet 650 mg, 650 mg, Oral, Q4H PRN  rivaroxaban (XARELTO) tablet 10 mg, 10 mg, Oral, Daily  ipratropium-albuterol (DUONEB) nebulizer solution 1 ampule, 1 ampule, Inhalation, Q4H WA  levothyroxine (SYNTHROID) tablet 50 mcg, 50 mcg, Oral, Daily  lidocaine 4 % external patch 1 patch, 1 patch, Transdermal, Daily  LORazepam (ATIVAN) tablet 0.5 mg, 0.5 mg, Oral, Q4H PRN  losartan (COZAAR) tablet 25 mg, 25 mg, Oral, Daily  metoprolol tartrate (LOPRESSOR) tablet 50 mg, 50 mg, Oral, BID  oxyCODONE-acetaminophen (PERCOCET) 5-325 MG per tablet 1 tablet, 1 tablet, Oral, Q4H PRN  pantoprazole (PROTONIX) tablet 40 mg, 40 mg, Oral, QAM AC  polyethylene glycol (GLYCOLAX) packet 17 g, 17 g, Oral, Daily  predniSONE (DELTASONE) tablet 10 mg, 10 mg, Oral, Daily      Impression/Plan:  Patient is an 35-year-old female with right hip fracture status post hemiarthroplasty    1. Continue acute inpatient rehab to work on transfers, self-care, ADLs, ambulation. 2. Orthopedics-right subcapital femoral fracture with hemiarthroplasty. 3. Rheumatology-history of rheumatoid arthritis on prednisone. 4. Hematology-postop anemia. Hemoglobin 10.1 on 5/23/2019. Patient is asymptomatic. 5. Hypertension-being monitored by internal medicine patient is currently on Cozaar and Lopressor. 6. Endocrine-patient with history of hypothyroidism on Synthroid. 7. Pulmonary- history of asthma and COPD. On DuoNeb treatments. 8. GI-on Protonix, GlycoLax, and Colace. 9. DVT prophylaxis-on Xarelto. 10. Internal medicine input appreciated. Electronically signed by Tylor Aly MD on 5/27/2019 at 10:44 AM      This note is created with the assistance of a speech recognition program.  While intending to generate a document that actually reflects the content of the visit, the document can still have some errors including those of syntax and sound a like substitutions which may escape proof reading.   In such instances, actual meaning can be extrapolated by contextual diversion.

## 2019-05-27 NOTE — PLAN OF CARE
Problem: Pain:  Goal: Pain level will decrease  Description  Pain level will decrease  Outcome: Ongoing   Adequate pain control achieved this shift. See MAR. Problem: Risk for Impaired Skin Integrity  Goal: Tissue integrity - skin and mucous membranes  Description  Structural intactness and normal physiological function of skin and  mucous membranes. Outcome: Ongoing   Skin integrity improved/maintained this shift. See head to toe assessment. Problem: Falls - Risk of:  Goal: Will remain free from falls  Description  Will remain free from falls  Outcome: Ongoing   Pt. Free of falls and injuries this shift.

## 2019-05-27 NOTE — PATIENT CARE CONFERENCE
[address] [facility]   ACUTE REHABILITATION  TEAM CONFERENCE NOTE  Date: 19  Patient Name: Brayden Cobian       Room: 0249/1144-34  MRN: [id number]       : 1935  (80 y.o.)     Gender: female   Referring Practitioner: MEGHAN Dodson  Diagnosis: S/P R hip hemiarthroplasty 19    NURSING  FIMS:  Bladder: 4 - Requires touching assistance to manage or place device (e.g. place urinal) < 25% assist  Bladder Level of Assistance: 4- Requires Minimal assistance to manage or place device, patient performs 75% or more of the bladder management tasks  Bladder Frequency of Accidents: 5 - One accident (soiling linens or clothing) in past 7 days, includes bed pan or urinal spills by patient  Bowel: 6 - Uses toilet independently with device or oral medication(s)  Bowel Level of Assistance: 6- Requires device like bedpan, diaper, bedside commode, but patient obtains and empties own device including colostomy.   Or requires bowel management medication including stool softeners, laxatives, inserts own suppository  Bowel Frequency of Accidents: 6 - No accidents, uses device like bedpan, diaper, bedside commode colostomy, or requires medication to manage bowels   Bladder  Continent  Bowel   Continent  Intervention    Both Bowel & Bladder Program     Wounds/Incisions/Ulcers: Incision healing well  Medication Education Program: Patient currently unable to manage medications and family being educated  Pain: Patient's pain is currently controlled with -  tylenol as needed, lidocaine patches daily, percocet as needed    Fall Risk:  Falling star program initiated    PHYSICAL THERAPY  Bed mobility  Scooting: Supervision  Bed Mobility  Supine to Sit: Stand by assistance(w/(R) LE only)  Sit to Supine: Contact guard assistance(for safety w/(R) LE)  Scooting: Supervision    Transfers:  Sit to Stand: Contact guard assistance  Stand to sit: Contact guard assistance  Bed to Chair: Contact guard assistance  Stand Pivot Transfers: Contact guard assistance    WB Status: WBAT R  Ambulation 1  Surface: level tile  Device: Rolling Walker  Assistance: Stand by assistance/CGA  Quality of Gait: slow pace, steady step through pattern  Gait Deviations: Slow Idane  Distance: 200 ft  Ambulation 2  Surface - 2: level tile  Device 2: Rolling Walker  Assistance 2: Stand by assistance  Quality of Gait 2: flexed hips, knees, and trunk  Distance: small spaces pt enviroment , multilple direction change   Comments: instruction to increse extension trink, hips, and knees     Stairs  # Steps : 12  Stairs Height: 6\"  Rails: Bilateral  Device: No Device  Assistance: Contact guard assistance  Comment: VC's for sequencing, repeated. Pt ascends steps fwd and decends steps retro at home. FIMS:  Bed, Chair, Wheel Chair: 4 - Requires steadying assistance only <25% assist  and/or requires assist with one leg only  Walk: 4 - Contact Guard/Minimal Assistance Requires up to Contact Guard or Minimal Assistance to walk at least 150 feet  Distance Walked: 200 ft  Stairs: 4- Minimal Contact Assistance Perfoms 75% or more of the effort to go up and down one flight of stairs    PT Equipment Recommendations  Equipment Needed: No    Assessment: Impaired mobility due to L Hemiarthroplasty secondary to Hip Fx. Pt would benefit from additional PT to increase strength, endurance, and independence with functional mobility. Goals  Short term goals  Time Frame for Short term goals: by 1 week  Short term goal 1: supervision bed mobility  Short term goal 2: transfers Min A from multiple surfaces  Short term goal 3: Ambulate 30 ft with RW in < 3 min   Short term goal 4: Amb up/down 5 stairs el HR mod A x 1  Short term goal 5: min A WC mobility 25 ft using BLE.     OCCUPATIONAL THERAPY  FIMS:  Groomin - Requires setup/cues to do all tasks(may need assist with opening/closing groom items )  Bathin - Able to bathe all 10 areas with setup/sup/cues  Dressing-Upper: 6 - Independent multiple surfaces  Long term goal 3: Ambulate 50-60 ft MI with RW  Long term goal 4: Amb up/down 5 steps el HR min A x 1  Long term goal 5: pt will be indep with wc mobility 50 ft  OT:Long term goals  Time Frame for Long term goals : By Discharge   Long term goal 1: D/V understanding of Joint protection strategies with application to care needs   Long term goal 2: Mod I for basic premorbid self care tasks    Long term goal 3: Tolerate standing for 10 minutes for self care and home management tasks   ST:     Team Members Present at Conference:  :  [facility], [address] [place] [address]  Occupational Therapist: Karolina Akhtar OT   Physical [address] [place] [address] PT  Speech Therapist:  N/A  Nurse: Carlos Chandler RN    Recreation Therapy: Brie Forman  Dietary/Nutrition: [employer]: Anahy Lamb  CMG:  Bishop Vin QUINTANILLA    I approve the established interdisciplinary plan of care as documented within the medical record of Michelle Fuchs.  Tony Wesley MD

## 2019-05-28 LAB
GLUCOSE BLD-MCNC: 108 MG/DL (ref 65–105)
GLUCOSE BLD-MCNC: 156 MG/DL (ref 65–105)

## 2019-05-28 PROCEDURE — 97530 THERAPEUTIC ACTIVITIES: CPT

## 2019-05-28 PROCEDURE — 99232 SBSQ HOSP IP/OBS MODERATE 35: CPT | Performed by: PHYSICAL MEDICINE & REHABILITATION

## 2019-05-28 PROCEDURE — 97535 SELF CARE MNGMENT TRAINING: CPT

## 2019-05-28 PROCEDURE — 94640 AIRWAY INHALATION TREATMENT: CPT

## 2019-05-28 PROCEDURE — 1180000000 HC REHAB R&B

## 2019-05-28 PROCEDURE — 6370000000 HC RX 637 (ALT 250 FOR IP): Performed by: ORTHOPAEDIC SURGERY

## 2019-05-28 PROCEDURE — 97116 GAIT TRAINING THERAPY: CPT

## 2019-05-28 PROCEDURE — 6370000000 HC RX 637 (ALT 250 FOR IP): Performed by: PHYSICAL MEDICINE & REHABILITATION

## 2019-05-28 PROCEDURE — 82947 ASSAY GLUCOSE BLOOD QUANT: CPT

## 2019-05-28 PROCEDURE — 97110 THERAPEUTIC EXERCISES: CPT

## 2019-05-28 RX ADMIN — METOPROLOL TARTRATE 50 MG: 50 TABLET ORAL at 09:34

## 2019-05-28 RX ADMIN — SENNOSIDES,DOCUSATE SODIUM 1 TABLET: 8.6; 5 TABLET, FILM COATED ORAL at 21:10

## 2019-05-28 RX ADMIN — LORAZEPAM 0.5 MG: 0.5 TABLET ORAL at 17:24

## 2019-05-28 RX ADMIN — OXYCODONE HYDROCHLORIDE AND ACETAMINOPHEN 1 TABLET: 5; 325 TABLET ORAL at 09:35

## 2019-05-28 RX ADMIN — OXYCODONE HYDROCHLORIDE AND ACETAMINOPHEN 1 TABLET: 5; 325 TABLET ORAL at 14:45

## 2019-05-28 RX ADMIN — IPRATROPIUM BROMIDE AND ALBUTEROL SULFATE 1 AMPULE: .5; 3 SOLUTION RESPIRATORY (INHALATION) at 19:08

## 2019-05-28 RX ADMIN — SENNOSIDES,DOCUSATE SODIUM 1 TABLET: 8.6; 5 TABLET, FILM COATED ORAL at 09:34

## 2019-05-28 RX ADMIN — LOSARTAN POTASSIUM 25 MG: 25 TABLET ORAL at 09:34

## 2019-05-28 RX ADMIN — OXYCODONE HYDROCHLORIDE AND ACETAMINOPHEN 1 TABLET: 5; 325 TABLET ORAL at 02:45

## 2019-05-28 RX ADMIN — MAGNESIUM HYDROXIDE 30 ML: 400 SUSPENSION ORAL at 06:28

## 2019-05-28 RX ADMIN — PANTOPRAZOLE SODIUM 40 MG: 40 TABLET, DELAYED RELEASE ORAL at 06:19

## 2019-05-28 RX ADMIN — METOPROLOL TARTRATE 50 MG: 50 TABLET ORAL at 21:09

## 2019-05-28 RX ADMIN — RIVAROXABAN 10 MG: 10 TABLET, FILM COATED ORAL at 21:09

## 2019-05-28 RX ADMIN — IPRATROPIUM BROMIDE AND ALBUTEROL SULFATE 1 AMPULE: .5; 3 SOLUTION RESPIRATORY (INHALATION) at 15:42

## 2019-05-28 RX ADMIN — LEVOTHYROXINE SODIUM 50 MCG: 50 TABLET ORAL at 06:19

## 2019-05-28 RX ADMIN — LORAZEPAM 0.5 MG: 0.5 TABLET ORAL at 21:24

## 2019-05-28 RX ADMIN — LORAZEPAM 0.5 MG: 0.5 TABLET ORAL at 00:06

## 2019-05-28 RX ADMIN — PREDNISONE 10 MG: 10 TABLET ORAL at 09:34

## 2019-05-28 RX ADMIN — LORAZEPAM 0.5 MG: 0.5 TABLET ORAL at 06:19

## 2019-05-28 RX ADMIN — LORAZEPAM 0.5 MG: 0.5 TABLET ORAL at 11:50

## 2019-05-28 RX ADMIN — OXYCODONE HYDROCHLORIDE AND ACETAMINOPHEN 1 TABLET: 5; 325 TABLET ORAL at 21:10

## 2019-05-28 ASSESSMENT — PAIN SCALES - GENERAL
PAINLEVEL_OUTOF10: 7
PAINLEVEL_OUTOF10: 1
PAINLEVEL_OUTOF10: 7
PAINLEVEL_OUTOF10: 5
PAINLEVEL_OUTOF10: 3
PAINLEVEL_OUTOF10: 7
PAINLEVEL_OUTOF10: 0

## 2019-05-28 ASSESSMENT — PAIN DESCRIPTION - ORIENTATION: ORIENTATION: RIGHT

## 2019-05-28 ASSESSMENT — PAIN DESCRIPTION - LOCATION: LOCATION: HIP

## 2019-05-28 ASSESSMENT — PAIN DESCRIPTION - DESCRIPTORS: DESCRIPTORS: ACHING

## 2019-05-28 NOTE — PROGRESS NOTES
Physical Therapy  [facility]   Acute Rehabilitation Physical Therapy Progress Note    Date: 19  Patient Name: Dennise Fisher       Room: 1871/6919-18  MRN: [id number]   Account: [de-identified]   : 1935  (80 y.o.) Gender: female     Referring Practitioner: Robert Orr / Sarah Escobedo / Shari Katz   (rehab)       Dr. Monica Mcdonald (ortho)   Diagnosis: S/P R hip hemiarthroplasty 19  Past Medical History:  has a past medical history of Allergic rhinitis, Anemia, Anxiety, Asthma, Cataract, COPD (chronic obstructive pulmonary disease) ([address] CTS (carpal tunnel syndrome), Esophageal reflux, Headache(784.0), Hyperlipidemia, Hypertension, Hypothyroidism, Osteoarthritis, Osteoporosis, and Rheumatoid arthritis(714.0). Past Surgical History:   has a past surgical history that includes Rotator cuff repair (Left); Tonsillectomy and adenoidectomy; Hysterectomy, vaginal; Foot surgery; Hemorrhoid surgery; Dilation and curettage of uterus; Cataract removal with implant (Bilateral); Inguinal hernia repair; Cystocele repair; joint replacement (Left); other surgical history; Hammer toe surgery; other surgical history; and HEMIARTHROPLASTY HIP (Right, 2019). Additional Pertinent Hx: RA, advanced age, R hemiarthroplasty 19     Restrictions/Precautions  Restrictions/Precautions: Fall Risk;Weight Bearing  Required Braces or Orthoses?: No  Implants present? : (R hip hemiarthroplasty 19; L MARYANN)  Lower Extremity Weight Bearing Restrictions  Right Lower Extremity Weight Bearing: Weight Bearing As Tolerated  Position Activity Restriction  Hip Precautions: No hip flexion > 90 degrees; No hip extension; No hip external rotation; No ADduction  Other position/activity restrictions: Posterolateral approach - no hip flexion greater than 90 degrees, no hip adduction across midline, no hip hyperextension, and no external rotation.     Subjective: Pt denies pain, but c/o increased pain after overexertion during isometric glute sets  Comments: Pain patches on B knees; pt reports they need to be paper-taped to secure position. Vital Signs  Patient Currently in Pain: Yes  Pain Level: 7  Pain Location: Hip  Pain Orientation: Right  Pain Descriptors: Aching  Non-Pharmaceutical Pain Intervention(s): Ambulation/Increased Activity;Repositioned; Rest  Response to Pain Intervention: Patient Satisfied;None     Bed Mobility  Supine to Sit: Stand by assistance(w/(R) LE only)  Sit to Supine: Contact guard assistance(for safety w/(R) LE)  Scooting: Supervision  Comment: Mat, 3 pillows    Transfers:  Sit to Stand: Stand by assistance(Pt demo's good hand placement today)  Stand to sit: Stand by assistance(Cues for hand placement with good return)  Stand Pivot Transfers: Stand by assistance   Comment: RW     WB Status: WBAT R  Ambulation 1  Surface: level tile  Device: Rolling Walker  Assistance: Stand by assistance  Quality of Gait: slow pace, steady step-through pattern; pt verbalizes throughout \"heel (R LE) toe (L LE) push (RW)\" while ambulating with little correlation to actual movement. Heel strike about 50% B. Gait Deviations: Slow Diane  Distance: 200 ft  Comments: Extra time req'd as pt becomes distracted. >7 min. Stairs/Curb  Stairs?: Yes  Stairs  # Steps : 12  Stairs Height: 6\"  Rails: Bilateral  Device: No Device  Assistance: Contact guard assistance  Comment: Pt voiced confusion about sequencing before standing, but demo's good technique in process. Pt ascends steps fwd and decends steps retro at home.      FIMS:  TRANSFERS  Bed, Chair, Wheel Chair: 4 - Requires steadying assistance only <25% assist  and/or requires assist with one leg only   LOCOMOTION  Primary Mode: Walk  Distance Walked: 200'(RW)  Walk: 5 - Supervision Requires standby supervision or cuing to walk at least 150 feet  Stairs: 4- Minimal Contact Assistance Perfoms 75% or more of the effort to go up and down one flight of stairs(12 6\" steps, B HR)     BALANCE Posture: Fair(kyphotic, vc's upright posture)  Sitting - Static: Good(EOM, no back or UE support)  Sitting - Dynamic: Fair;+(UE support EOM, no back support)  Standing - Static: Fair;+(RW)  Standing - Dynamic: Fair(RW)    EXERCISES    Other exercises?: Yes  Other exercises 2: seated LE ex: 1.5# L LE/AROM R LE + lime resistance band, 10-15x each(Maintaining precautions)  Other exercises 3: supine bilateral LE, 15x each/to pt tolerance, AROM(C/o increased R hip pain p 30 reps isometric glute sets)  Other exercises 4: Seated BLE ex's maintain all precautions. AROM. Reps: 10 each. , 1.5lb ankle weight  Other exercises 7: Toilet transfer x1(SBA transfer, CGA doff, Min A don, SBA pericare; pt performing 2 min. squat (CGA) to assist with free urination)     Activity Tolerance: Patient Tolerated treatment well, Patient limited by pain    Other Comments  Comments: Difficult to keep pt on task due to excessive talking. Patient Education  New Education Provided: Exercise to tolerance  Learner:patient  Method: explanation       Outcome: needs reinforcement     Current Treatment Recommendations: Strengthening, ROM, Balance Training, Transfer Training, Gait Training, Stair training, Neuromuscular Re-education, Functional Mobility Training, Home Exercise Program, Safety Education & Training, Patient/Caregiver Education & Training, Wheelchair Mobility Training, Equipment Evaluation, Education, & procurement    Conditions Requiring Skilled Therapeutic Intervention  Body structures, Functions, Activity limitations: Decreased functional mobility ; Decreased ADL status; Decreased ROM; Decreased strength  Patient Education: Exercise to tolerance  REQUIRES PT FOLLOW UP: Yes  Discharge Recommendations: Home with assist PRN;Home with Home health PT    Goals  Short term goals  Time Frame for Short term goals: by 1 week  Short term goal 1: supervision bed mobility  Short term goal 2: transfers Min A from multiple surfaces  Short term goal 3: Ambulate 30 ft with RW in < 3 min   Short term goal 4: Amb up/down 5 stairs el HR mod A x 1  Short term goal 5: min A WC mobility 25 ft using BLE.   Long term goals  Time Frame for Long term goals : by discharge  Long term goal 1: Bed mobility Mod I  Long term goal 2: Transfers Mod I multiple surfaces  Long term goal 3: Ambulate 50-60 ft MI with RW  Long term goal 4: Amb up/down 5 steps el HR min A x 1  Long term goal 5: pt will be indep with wc mobility 50 ft     05/28/19 1025 05/28/19 1303   PT Individual Minutes   Time In 1018 1301   Time Out 1120 1328   Minutes 62 27   PT Concurrent Minutes   Time In 3773  --    Time Out 1300  --    Minutes 11  --      Electronically signed by Savannah Easton PTA on 5/28/19 at 3:39 PM

## 2019-05-28 NOTE — PROGRESS NOTES
[address]   ACUTE REHABILITATION OCCUPATIONAL THERAPY  DAILY NOTE    Date: 19  Patient Name: Harriett Ward      Room: 0302/5682-64    MRN: [id number]   : 1935  (80 y.o.)  Gender: female   Referring Practitioner: Robert Townsend / Gerri Thompson / Sunita Huerta   (rehab)       Dr. 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 : (R hip hemiarthroplasty 19; L MARYANN)  Hip Precautions: No hip flexion > 90 degrees, No hip extension, No hip external rotation, No ADduction  Other position/activity restrictions: Posterolateral approach - no hip flexion greater than 90 degrees, no hip adduction across midline, no hip hyperextension, and no external rotation. Right Lower Extremity Weight Bearing: Weight Bearing As Tolerated  Required Braces or Orthoses?: No    Subjective  Subjective: Pt states she does not feel comfortable being on her own yet. Comments: Pt very slow moving c tasks, req increased time. Pt req cues for re attention to task, perversates on topics/hyper verbal.   Patient Currently in Pain: Yes  Pain Level: 7  Restrictions/Precautions: Fall Risk;Weight Bearing        Pain Assessment  Pain Assessment: 0-10  Pain Level: 7    Objective  Cognition  Overall Cognitive Status: Impaired  Attention Span: Attends with cues to redirect  Memory: Decreased short term memory  Following Commands:  Follows one step commands with repetition  Safety Judgement: Decreased awareness of need for safety  Awareness of Errors: Decreased awareness of errors  Insights: Decreased awareness of deficits  Sequencing and Organization: Assistance required to implement solutions;Assistance required to generate solutions;Assistance required to identify errors made  Perception  Overall Perceptual Status: Impaired  Perseveration: only(set up )  Dressing-Lower: 4 - Requires assist with buttons/zippers/shoelaces and/or assist with shoes only(increased time for threading c reacher, A to adjust in back )  Toiletin - Requires setup/supervision/cues(SBA/sup )  Toilet Transfer: 5 - Requires setup/supervision/cues(SBA/sup, cues for RW mgmt)  Primary Mode: Shower  Tub Transfer: 0 - Activity does not occur  Shower Transfer: 0 - Activity does not occur(per pt she does not shower. )    Social Interaction: 5 - Patient is appropriate with supervision/cues  Problem Solvin - Patient solves simple/routine tasks 75-90%+   Memory: 4 - Patient remembers 75-90%+ of the time    Assessment  Performance deficits / Impairments: Decreased functional mobility ; Decreased ADL status; Decreased ROM; Decreased strength;Decreased safe awareness;Decreased cognition;Decreased endurance;Decreased balance;Decreased high-level IADLs;Decreased coordination  Prognosis: Good  Discharge Recommendations: Home with assist PRN;Home with Home health OT; Home with nursing aide  Activity Tolerance: Patient Tolerated treatment well  Safety Devices in place: Yes  Type of devices: Gait belt;Patient at risk for falls;Call light within reach;Nurse notified  Equipment Recommendations  Equipment Needed: Yes  Reacher: for LBD  Sock Aid: X  Other: Long Handled Sponge     Plan  Plan  Times per week: 900/7   Times per day: Twice a day  Current Treatment Recommendations: Self-Care / ADL, Balance Training, Functional Mobility Training, Endurance Training, Safety Education & Training, Patient/Caregiver Education & Training, Positioning, Strengthening, ROM, Cognitive Reorientation, Home Management Training  Plan Comment: continue OT  Patient Goals   Patient goals : To Return Home able to care for herself   Short term goals  Time Frame for Short term goals: 1 week   Short term goal 1: Pt will V/D Good understanding of AE/DME/modified techniques for increased IND with self-care and mobility.   Short term goal 2: Pt will actively participate in 10+ minutes of self-care to the best of Pt's ability to promote increased IND with self-care. Short term goal 3: Pt will perform bed mobility with Min A to sit EOB for increased participation with self-care. Short term goal 4: Pt will perform sit to stand transfer with Min A with RW with Good safety awareness. Short term goal 5: Pt will actively participate in 10-15 minutes in therapeutic exercise/functional activities to promote increased IND with self-care and mobility.   Long term goals  Time Frame for Long term goals : By Discharge   Long term goal 1: D/V understanding of Joint protection strategies with application to care needs   Long term goal 2: Mod I for basic premorbid self care tasks    Long term goal 3: Tolerate standing for 10 minutes for self care and home management tasks         05/28/19 0938 05/28/19 1531   OT Individual Minutes   Time In 0837 1332   Time Out 0927 1400   Minutes 50 28     Electronically signed by RAI Eden on 5/28/19 at 3:44 PM

## 2019-05-28 NOTE — PROGRESS NOTES
Physical Medicine & Rehabilitation  Progress Note    5/28/2019 4:56 PM     CC: Ambulatory and ADL dysfunction due to right hip fracture    Subjective:   Feels well. No complaints. Pain controlled. ROS:  Denies fevers, chills, sweats. No chest pain, palpitations, lightheadedness. Denies coughing, wheezing or shortness of breath. Denies abdominal pain, nausea, diarrhea or constipation. No new areas of joint pain. Denies new areas of numbness or weakness. Denies new anxiety or depression issues. No new skin problems. Rehabilitation:     See team conference note    PT:  Restrictions/Precautions: Fall Risk, Weight Bearing  Implants present? : (R hip hemiarthroplasty 5/17/19; L MARYANN)  Hip Precautions: No hip flexion > 90 degrees, No hip extension, No hip external rotation, No ADduction  Other position/activity restrictions: Posterolateral approach - no hip flexion greater than 90 degrees, no hip adduction across midline, no hip hyperextension, and no external rotation. Right Lower Extremity Weight Bearing: Weight Bearing As Tolerated   Transfers  Sit to Stand: Stand by assistance(Pt demo's good hand placement today)  Stand to sit: Stand by assistance(Cues for hand placement with good return)  Bed to Chair: Contact guard assistance  Comment: RW  WB Status: WBAT R  Ambulation 1  Surface: level tile  Device: Rolling Walker  Assistance: Stand by assistance  Quality of Gait: slow pace, steady step-through pattern; pt verbalizes throughout \"heel (R LE) toe (L LE) push (RW)\" while ambulating with little correlation to actual movement. Heel strike about 50% B. Gait Deviations: Slow Diane  Distance: 200 ft  Comments: Extra time req'd as pt becomes distracted. >7 min.      Transfers  Sit to Stand: Stand by assistance(Pt demo's good hand placement today)  Stand to sit: Stand by assistance(Cues for hand placement with good return)  Bed to Chair: Contact guard assistance  Comment: RW  Ambulation  Ambulation?: Yes  WB Status: WBAT R  Ambulation 1  Surface: level tile  Device: Rolling Walker  Assistance: Stand by assistance  Quality of Gait: slow pace, steady step-through pattern; pt verbalizes throughout \"heel (R LE) toe (L LE) push (RW)\" while ambulating with little correlation to actual movement. Heel strike about 50% B. Gait Deviations: Slow Diane  Distance: 200 ft  Comments: Extra time req'd as pt becomes distracted. >7 min. Surface: level tile  Ambulation 1  Surface: level tile  Device: Rolling Walker  Assistance: Stand by assistance  Quality of Gait: slow pace, steady step-through pattern; pt verbalizes throughout \"heel (R LE) toe (L LE) push (RW)\" while ambulating with little correlation to actual movement. Heel strike about 50% B. Gait Deviations: Slow Diane  Distance: 200 ft  Comments: Extra time req'd as pt becomes distracted. >7 min. OT:      Instrumental ADL's  Instrumental ADLs: Yes     Balance  Sitting Balance: Modified independent   Standing Balance: Supervision   Standing Balance  Time: AM: 2-3 min x5, 6-7 min x 2 PM: ~15 min   Activity: AM: ADL tasks in room, mobility in room/bathroom. PM: kitchen mobility/RW mgmt for functional housekeeping tasks-see IADL. Comment: No LOB, slow to ambulate, fair safety technique c RW  Functional Mobility  Functional - Mobility Device: Rolling Walker  Activity: To/from bathroom  Assist Level: Stand by assistance(sup-SBA)  Functional Mobility Comments: Verbal cues using RW to ambulate to bathroom.  Pt ambulates using RW  to /from toilet w min assist.      Bed mobility  Bridging: Stand by assistance  Supine to Sit: Minimal assistance  Sit to Supine: Supervision  Scooting: Supervision  Transfers  Stand Step Transfers: Supervision  Sit to stand: Supervision  Stand to sit: Supervision  Transfer Comments: fair safety    Toilet Transfers  Toilet - Technique: Ambulating  Equipment Used: (Rolling shower chair over standard toilet)  Toilet Transfer: Supervision  Toilet Transfers Comments: Pt transfers on and off toilet many times throughout ADL session d/t frequent need to use bathroom as well as heightened seat for LB dressing tasks. Objective:  BP (!) 140/56   Pulse 85   Temp 98.4 °F (36.9 °C) (Oral)   Resp 16   Ht 4' 11\" (1.499 m)   Wt 130 lb (59 kg)   SpO2 98%   BMI 26.26 kg/m²  I Body mass index is 26.26 kg/m². I   Wt Readings from Last 1 Encounters:   19 130 lb (59 kg)      Temp (24hrs), Av.2 °F (36.8 °C), Min:98 °F (36.7 °C), Max:98.4 °F (36.9 °C)         GEN: well developed, well nourished, no acute distress  HEENT: Normocephalic atraumatic, EOMI, mucous membranes pink and moist  CV: RRR, no murmurs, rubs or gallops  PULM: CTAB, no rales or rhonchi. Respirations WNL and unlabored  ABD: soft, NT, ND, +BS and equal  NEURO: A&O x3. Sensation intact to light touch. MSK: PROM within functional limits. UE, LLE, limited R prox, RA changes of hands, . EXTREMITIES: No calf tenderness to palpation bilaterally. No edema BLEs  SKIN: warm dry and intact with good turgor, right hip dressing clean ,  PSYCH: appropriately interactive. Affect WNL.   Good spirits    Medications   Scheduled Meds:   vitamin D  50,000 Units Oral Weekly    sennosides-docusate sodium  1 tablet Oral BID    lidocaine  1 patch Transdermal Daily    rivaroxaban  10 mg Oral Daily    ipratropium-albuterol  1 ampule Inhalation Q4H WA    levothyroxine  50 mcg Oral Daily    lidocaine  1 patch Transdermal Daily    losartan  25 mg Oral Daily    metoprolol tartrate  50 mg Oral BID    pantoprazole  40 mg Oral QAM AC    polyethylene glycol  17 g Oral Daily    predniSONE  10 mg Oral Daily     Continuous Infusions:  PRN Meds:.aluminum & magnesium hydroxide-simethicone, bisacodyl, magnesium hydroxide, acetaminophen, LORazepam, oxyCODONE-acetaminophen     Diagnostics:     CBC:   Recent Labs     19  0625   WBC 6.5   RBC 3.80*   HGB 10.2*   HCT 31.9*   MCV 84.1 RDW 17.0*        BMP:   Recent Labs     05/27/19  0625      K 3.5*      CO2 24   BUN 16   CREATININE <0.40*     BNP: No results for input(s): BNP in the last 72 hours. PT/INR: No results for input(s): PROTIME, INR in the last 72 hours. APTT: No results for input(s): APTT in the last 72 hours. CARDIAC ENZYMES: No results for input(s): CKMB, CKMBINDEX, TROPONINT in the last 72 hours. Invalid input(s): CKTOTAL;3  FASTING LIPID PANEL:No results found for: CHOL, HDL, TRIG  LIVER PROFILE: No results for input(s): AST, ALT, ALB, BILIDIR, BILITOT, ALKPHOS in the last 72 hours. I/O (24Hr): No intake or output data in the 24 hours ending 05/28/19 1656    Glu last 24 hour  Recent Labs     05/27/19  1038 05/27/19  1616 05/28/19  0709 05/28/19  1552   POCGLU 111* 168* 108* 156*       No results for input(s): CLARITYU, COLORU, PHUR, SPECGRAV, PROTEINU, RBCUA, BLOODU, BACTERIA, NITRU, WBCUA, LEUKOCYTESUR, YEAST, GLUCOSEU, BILIRUBINUR in the last 72 hours. Impression/Plan:    1. Mobility and ADL dysfunction secondary to right subcapitalfemur fracture with hemiarthroplasty-continue rehab efforts, team conf reviewed, discharge plan 6/4  2. Right subcapital femur fracture with hemiarthroplasty-monitor incision, monitor dressing, if continue drainage-notify ortho-discussed with nursing   3. Rheumatoid arthritis- prednisone  4. Postop anemia-monitor mproving  5. Mildly elevated glucose-monitor, possibly due to steroids, glucose as above  6. Hypertension-Cozaar and Lopressor  7. Anxiety- Ativan  8. Pain-Percocet/Lidoderm patch  9. Hypothyroid-Synthroid  10. Mild hypokalemia-recheck a.m.  11. Pulmonary-methotrexate long-DuoNeb  12. GI/history hemorrhoids-Protonix, MiraLAX, Senokot S  13. DVT prophylaxis-Xarelto  14. Internal medicine for medical management          Arnikolai Persons. Juliana Kirby MD       This note is created with the assistance of a speech recognition program.  While intending to generate a document

## 2019-05-28 NOTE — PROGRESS NOTES
[place] [address] Internal Medicine    Progress Note    5/27/2019    10:19 PM    Name:   Ananth Cartagena  MRN:     [id number]     [place]:      [de-identified]   Room:   [address] [place] [address] Day:  6  Admit Date:  5/21/2019  6:04 PM    PCP:   Feli Costa MD  Code Status:  Full Code    Subjective:     C/C: No chief complaint on file. Interval History Status: improved. HPI:   Patient has history of rheumatoid arthritis, chronically on steroids, osteoporosis. Patient had right hip fracture, after she sustained a fall.  status post surgery, doing better    Review of Systems:     Constitutional:  negative for chills, fevers, sweats  Respiratory:  negative for cough, dyspnea on exertion, hemoptysis, shortness of breath, wheezing  Cardiovascular:  negative for chest pain, chest pressure/discomfort, lower extremity edema, palpitations  Gastrointestinal:  negative for abdominal pain, constipation, diarrhea, nausea, vomiting  Neurological:  negative for dizziness, headache  Extremity - Pain in Right Hip area   Medications: Allergies:     Allergies   Allergen Reactions    Methotrexate Derivatives Other (See Comments)     unknown    No Known Allergies     Seasonal Other (See Comments)     Congestion       Current Meds:   Scheduled Meds:    vitamin D  50,000 Units Oral Weekly    sennosides-docusate sodium  1 tablet Oral BID    lidocaine  1 patch Transdermal Daily    rivaroxaban  10 mg Oral Daily    ipratropium-albuterol  1 ampule Inhalation Q4H WA    levothyroxine  50 mcg Oral Daily    lidocaine  1 patch Transdermal Daily    losartan  25 mg Oral Daily    metoprolol tartrate  50 mg Oral BID    pantoprazole  40 mg Oral QAM AC    polyethylene glycol  17 g Oral Daily    predniSONE  10 mg Oral Daily     Continuous Infusions:   PRN Meds: aluminum & magnesium hydroxide-simethicone, bisacodyl, magnesium hydroxide, acetaminophen, LORazepam, oxyCODONE-acetaminophen    Data:     Past Medical History:   has a past medical history of Allergic rhinitis, Anemia, Anxiety, Asthma, Cataract, COPD (chronic obstructive pulmonary disease) ([address] CTS (carpal tunnel syndrome), Esophageal reflux, Headache(784.0), Hyperlipidemia, Hypertension, Hypothyroidism, Osteoarthritis, Osteoporosis, and Rheumatoid arthritis(714.0). Social History:   reports that she has never smoked. She has never used smokeless tobacco. She reports that she does not drink alcohol or use drugs. Family History:   Family History   Problem Relation Age of Onset   John Carry Cancer Mother     Hypertension Mother     Heart Disease Mother     Stroke Maternal Grandmother     Heart Disease Maternal Grandmother     Cancer Paternal Grandmother     Heart Disease Paternal Grandmother     Heart Disease Father     Heart Disease Maternal Grandfather     Heart Disease Paternal Grandfather        Vitals:  BP (!) 160/74   Pulse 97   Temp 98 °F (36.7 °C) (Oral)   Resp 16   Ht 4' 11\" (1.499 m)   Wt 130 lb (59 kg)   SpO2 96%   BMI 26.26 kg/m²   Temp (24hrs), Av.9 °F (36.6 °C), Min:97.7 °F (36.5 °C), Max:98 °F (36.7 °C)    Recent Labs     19  1601 19  2105 19  1038 19  1616   POCGLU 135* 144* 111* 168*       I/O (24Hr):   No intake or output data in the 24 hours ending 19 5700    Labs:    [unfilled]    Lab Results   Component Value Date/Time    SPECIAL NOT REPORTED 2016 09:16 PM     Lab Results   Component Value Date/Time    CULTURE NORMAL URO-GENITAL SELVIN 2016 09:16 PM    CULTURE NEGATIVE FOR NEISSERIA GONORRHOEAE 2016 09:16 PM    CULTURE  2016 09:16 PM     [employer] [address] [facility], [address] [place] [address] [phone]       [facility]    Radiology:      Physical Examination:        General appearance:  alert, cooperative and no distress, wheel chair Bound   Mental Status:  oriented to person, place and time and normal affect  Lungs:  clear to auscultation bilaterally, normal effort  Heart:  regular rate and rhythm, no murmur  Abdomen:  soft, nontender, nondistended, normal bowel sounds, no masses, hepatomegaly, splenomegaly  Extremities:  no edema, redness, tenderness in the calves  Skin:  no gross lesions, rashes, induration    Assessment:        Primary Problem  <principal problem not specified>    Active Hospital Problems    Diagnosis Date Noted    S/P right hip fracture [Z87.81] 05/22/2019    History of hemiarthroplasty of right hip [Z96.641] 05/21/2019    [facility] [M06.9] 12/01/2014    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 Right hip fracture status post surgery  2. Rheumatoid arthritis, on steroids her home dose of prednisone was started  3. Patient is on DVT prophylaxis with Xarelto  4. Hypertension, controlled  5.  Hypothyroidism, patient is on Synthroid    5/25  Doing better   Wheel Chair Bound   On [facility]   5/26   Vitamin D is Low , started on Vitamin D Replacement   DOing better   On wheel chair   5/27  Doing better   On Vitamin D supplementation     Bacilio Lofton MD  5/27/2019  10:19 PM

## 2019-05-28 NOTE — PLAN OF CARE
Problem: Pain:  Goal: Control of acute pain  Description  Control of acute pain  5/28/2019 0539 by Tammy Fletcher RN  Outcome: Ongoing  Note:   Patient expresses relief following administration of prn pain medication. Problem: Risk for Impaired Skin Integrity  Goal: Tissue integrity - skin and mucous membranes  Description  Structural intactness and normal physiological function of skin and  mucous membranes. Outcome: Ongoing  Note:   No new occurrence of skin breakdown noted during this shift. Problem: Falls - Risk of:  Goal: Will remain free from falls  Description  Will remain free from falls  Outcome: Ongoing  Note:   Patient remains free of incidence/ injury. Bed remains in low position.  Up with walker and standby assist.

## 2019-05-28 NOTE — PLAN OF CARE
Problem: Pain:  Goal: Control of acute pain  Description  Control of acute pain  Outcome: Ongoing  Note:   Pt reports pain is controlled by current pain regimen. Will continue to monitor and medicate per MD orders. Problem: Risk for Impaired Skin Integrity  Goal: Tissue integrity - skin and mucous membranes  Description  Structural intactness and normal physiological function of skin and  mucous membranes. Outcome: Ongoing  Note:   Pt educated on risks for impaired skin integrity. Pt assisted in keeping skin clean and dry, assisted and prompted to reposition frequently. Will continue to monitor and intervene as needed. Problem: Falls - Risk of:  Goal: Will remain free from falls  Description  Will remain free from falls  Outcome: Ongoing  Note:   Pt educated on and verbalizes understanding of fall risks. Pt wearing nonskid stockings, uses assistive devices appropriately , encouraged to ask for assistance. Will continue to monitor and intervene as needed.

## 2019-05-29 DIAGNOSIS — S72.001D CLOSED FRACTURE OF RIGHT HIP WITH ROUTINE HEALING, SUBSEQUENT ENCOUNTER: Primary | ICD-10-CM

## 2019-05-29 LAB
GLUCOSE BLD-MCNC: 152 MG/DL (ref 65–105)
POTASSIUM SERPL-SCNC: 3.4 MMOL/L (ref 3.7–5.3)

## 2019-05-29 PROCEDURE — 36415 COLL VENOUS BLD VENIPUNCTURE: CPT

## 2019-05-29 PROCEDURE — 6370000000 HC RX 637 (ALT 250 FOR IP): Performed by: INTERNAL MEDICINE

## 2019-05-29 PROCEDURE — 99232 SBSQ HOSP IP/OBS MODERATE 35: CPT | Performed by: INTERNAL MEDICINE

## 2019-05-29 PROCEDURE — 84132 ASSAY OF SERUM POTASSIUM: CPT

## 2019-05-29 PROCEDURE — 6370000000 HC RX 637 (ALT 250 FOR IP): Performed by: ORTHOPAEDIC SURGERY

## 2019-05-29 PROCEDURE — 97110 THERAPEUTIC EXERCISES: CPT

## 2019-05-29 PROCEDURE — 97116 GAIT TRAINING THERAPY: CPT

## 2019-05-29 PROCEDURE — 97530 THERAPEUTIC ACTIVITIES: CPT

## 2019-05-29 PROCEDURE — 1180000000 HC REHAB R&B

## 2019-05-29 PROCEDURE — 99232 SBSQ HOSP IP/OBS MODERATE 35: CPT | Performed by: PHYSICAL MEDICINE & REHABILITATION

## 2019-05-29 PROCEDURE — 94640 AIRWAY INHALATION TREATMENT: CPT

## 2019-05-29 PROCEDURE — 6370000000 HC RX 637 (ALT 250 FOR IP): Performed by: PHYSICAL MEDICINE & REHABILITATION

## 2019-05-29 PROCEDURE — 94761 N-INVAS EAR/PLS OXIMETRY MLT: CPT

## 2019-05-29 PROCEDURE — 97535 SELF CARE MNGMENT TRAINING: CPT

## 2019-05-29 PROCEDURE — 82947 ASSAY GLUCOSE BLOOD QUANT: CPT

## 2019-05-29 RX ORDER — LORAZEPAM 0.5 MG/1
0.5 TABLET ORAL EVERY 6 HOURS PRN
Status: DISCONTINUED | OUTPATIENT
Start: 2019-05-29 | End: 2019-06-04 | Stop reason: HOSPADM

## 2019-05-29 RX ORDER — POTASSIUM CHLORIDE 20 MEQ/1
40 TABLET, EXTENDED RELEASE ORAL ONCE
Status: COMPLETED | OUTPATIENT
Start: 2019-05-29 | End: 2019-05-29

## 2019-05-29 RX ORDER — OXYCODONE HYDROCHLORIDE AND ACETAMINOPHEN 5; 325 MG/1; MG/1
1 TABLET ORAL EVERY 6 HOURS PRN
Status: DISCONTINUED | OUTPATIENT
Start: 2019-05-29 | End: 2019-06-04 | Stop reason: HOSPADM

## 2019-05-29 RX ORDER — FUROSEMIDE 20 MG/1
20 TABLET ORAL DAILY
Status: DISCONTINUED | OUTPATIENT
Start: 2019-05-29 | End: 2019-05-30

## 2019-05-29 RX ADMIN — LEVOTHYROXINE SODIUM 50 MCG: 50 TABLET ORAL at 06:23

## 2019-05-29 RX ADMIN — OXYCODONE HYDROCHLORIDE AND ACETAMINOPHEN 1 TABLET: 5; 325 TABLET ORAL at 23:22

## 2019-05-29 RX ADMIN — OXYCODONE HYDROCHLORIDE AND ACETAMINOPHEN 1 TABLET: 5; 325 TABLET ORAL at 01:14

## 2019-05-29 RX ADMIN — LORAZEPAM 0.5 MG: 0.5 TABLET ORAL at 08:44

## 2019-05-29 RX ADMIN — SENNOSIDES,DOCUSATE SODIUM 1 TABLET: 8.6; 5 TABLET, FILM COATED ORAL at 08:44

## 2019-05-29 RX ADMIN — RIVAROXABAN 10 MG: 10 TABLET, FILM COATED ORAL at 17:22

## 2019-05-29 RX ADMIN — IPRATROPIUM BROMIDE AND ALBUTEROL SULFATE 1 AMPULE: .5; 3 SOLUTION RESPIRATORY (INHALATION) at 20:20

## 2019-05-29 RX ADMIN — PREDNISONE 10 MG: 10 TABLET ORAL at 08:44

## 2019-05-29 RX ADMIN — OXYCODONE HYDROCHLORIDE AND ACETAMINOPHEN 1 TABLET: 5; 325 TABLET ORAL at 06:24

## 2019-05-29 RX ADMIN — PANTOPRAZOLE SODIUM 40 MG: 40 TABLET, DELAYED RELEASE ORAL at 06:23

## 2019-05-29 RX ADMIN — LORAZEPAM 0.5 MG: 0.5 TABLET ORAL at 17:22

## 2019-05-29 RX ADMIN — OXYCODONE HYDROCHLORIDE AND ACETAMINOPHEN 1 TABLET: 5; 325 TABLET ORAL at 10:44

## 2019-05-29 RX ADMIN — OXYCODONE HYDROCHLORIDE AND ACETAMINOPHEN 1 TABLET: 5; 325 TABLET ORAL at 17:22

## 2019-05-29 RX ADMIN — IPRATROPIUM BROMIDE AND ALBUTEROL SULFATE 1 AMPULE: .5; 3 SOLUTION RESPIRATORY (INHALATION) at 15:38

## 2019-05-29 RX ADMIN — LOSARTAN POTASSIUM 25 MG: 25 TABLET ORAL at 08:44

## 2019-05-29 RX ADMIN — METOPROLOL TARTRATE 50 MG: 50 TABLET ORAL at 20:42

## 2019-05-29 RX ADMIN — POTASSIUM CHLORIDE 40 MEQ: 20 TABLET, EXTENDED RELEASE ORAL at 17:22

## 2019-05-29 RX ADMIN — FUROSEMIDE 20 MG: 20 TABLET ORAL at 17:22

## 2019-05-29 RX ADMIN — METOPROLOL TARTRATE 50 MG: 50 TABLET ORAL at 08:44

## 2019-05-29 ASSESSMENT — PAIN DESCRIPTION - LOCATION
LOCATION: KNEE
LOCATION: BACK
LOCATION: GROIN

## 2019-05-29 ASSESSMENT — PAIN SCALES - GENERAL
PAINLEVEL_OUTOF10: 7
PAINLEVEL_OUTOF10: 4
PAINLEVEL_OUTOF10: 6
PAINLEVEL_OUTOF10: 7
PAINLEVEL_OUTOF10: 6
PAINLEVEL_OUTOF10: 7
PAINLEVEL_OUTOF10: 6
PAINLEVEL_OUTOF10: 4
PAINLEVEL_OUTOF10: 4
PAINLEVEL_OUTOF10: 6

## 2019-05-29 ASSESSMENT — PAIN DESCRIPTION - ORIENTATION
ORIENTATION: MID;LOWER
ORIENTATION: RIGHT;LEFT;ANTERIOR
ORIENTATION: RIGHT

## 2019-05-29 ASSESSMENT — PAIN DESCRIPTION - PAIN TYPE
TYPE: CHRONIC PAIN
TYPE: ACUTE PAIN

## 2019-05-29 ASSESSMENT — PAIN DESCRIPTION - FREQUENCY: FREQUENCY: OTHER (COMMENT)

## 2019-05-29 ASSESSMENT — PAIN DESCRIPTION - DESCRIPTORS
DESCRIPTORS: OTHER (COMMENT)
DESCRIPTORS: ACHING

## 2019-05-29 NOTE — FLOWSHEET NOTE
Patient is very talkative and somewhat anxious, she does not feel that she is ready to go home yet.  was able to be a listening presence, and allow patient to vent why she does not not feel she's getting closer to being discharge. Patient has good family support from  Ed and son.  will continue to offer spiritual care. 05/28/19 1900   Encounter Summary   Services provided to: Patient and family together   Referral/Consult From: [place]; Children;Friends/neighbors   Continue Visiting   (5/28/19)   Complexity of Encounter Low   Length of Encounter 15 minutes   Spiritual Assessment Completed Yes   Routine   Type Follow up   Assessment Calm; Approachable; Anxious; Coping   Intervention Active listening;Explored feelings, thoughts, concerns;Nurtured hope;Sustaining presence/ Ministry of presence   Outcome Expressed gratitude; Concerns relieved;Engaged in conversation; Less anxious, less agitated;Receptive   Visited by Jesús Serrano

## 2019-05-29 NOTE — PROGRESS NOTES
[address]  Acute Rehabilitation Physical Therapy Progress Note    Date: 19  Patient Name: Justin Heard       Room: 7034/9374-48  MRN: [id number]   Account: [de-identified]   : 1935  (80 y.o.) Gender: female     Referring Practitioner: Drs Austine Bumpers / Bella Riso / Mile Leach   (rehab)       Dr. Venus Ott (ortho)   Diagnosis: S/P R hip hemiarthroplasty 19  Past Medical History:  has a past medical history of Allergic rhinitis, Anemia, Anxiety, Asthma, Cataract, COPD (chronic obstructive pulmonary disease) ([address] CTS (carpal tunnel syndrome), Esophageal reflux, Headache(784.0), Hyperlipidemia, Hypertension, Hypothyroidism, Osteoarthritis, Osteoporosis, and Rheumatoid arthritis(714.0). Past Surgical History:   has a past surgical history that includes Rotator cuff repair (Left); Tonsillectomy and adenoidectomy; Hysterectomy, vaginal; Foot surgery; Hemorrhoid surgery; Dilation and curettage of uterus; Cataract removal with implant (Bilateral); Inguinal hernia repair; Cystocele repair; joint replacement (Left); other surgical history; Hammer toe surgery; other surgical history; and HEMIARTHROPLASTY HIP (Right, 2019). Additional Pertinent Hx: RA, advanced age, R hemiarthroplasty 19    Overall Orientation Status: Within Functional Limits  Restrictions/Precautions  Restrictions/Precautions: Fall Risk;Weight Bearing  Required Braces or Orthoses?: No  Implants present? : (R hip hemiarthroplasty 19; L MARYANN)  Lower Extremity Weight Bearing Restrictions  Right Lower Extremity Weight Bearing: Weight Bearing As Tolerated  Position Activity Restriction  Hip Precautions: No hip flexion > 90 degrees; No hip extension; No hip external rotation; No ADduction  Other position/activity restrictions: Posterolateral approach - no hip flexion greater than 90 degrees, no hip adduction across midline, no hip hyperextension, and no external rotation.     Subjective: Patient reports that she has pain in her knees at a 7/10, pain in her low back at a 6/10, pain in her groin on the right side at a 7/10, and swelling to R LE  Comments: Patient uses Salonpas pain patches on B knees    Vital Signs  Patient Currently in Pain: Yes  Pain Assessment: 0-10  Pain Level: 7  Pain Type: Acute pain  Pain Location: Groin  Pain Orientation: Right  Pain Descriptors:  Other (Comment)(tearing)  Pain Frequency: Other (Comment)(worst with bad weather)  Non-Pharmaceutical Pain Intervention(s): Cold applied     Oxygen Therapy  O2 Device: None (Room air)  Patient Observation  Observations: Swelling to R LE     Bed Mobility:   Bed Mobility  Rolling: Stand by assistance  Supine to Sit: Minimal assistance(R LE)  Sit to Supine: Minimal assistance(R LE)  Scooting: Supervision  Bed mobility  Scooting: Supervision    Transfers:  Sit to Stand: Stand by assistance(Cues for hand placement with good return)  Stand to sit: Stand by assistance(Cues for hand placement with good return)  Bed to Chair: Stand by assistance      WB Status: WBAT R  Ambulation 1  Surface: level tile  Device: Rolling Walker  Assistance: Stand by assistance  Quality of Gait: slow, steady pace; hip and knee flexion; step-through pattern; pt verbalizes \"heel, toe, push\" while ambulating  Gait Deviations: Slow Diane  Distance: 200 ft  Comments: Verbal cues given for tall posture / less felexion in hips and knees, with good return     Stairs/Curb  Stairs?: Yes  Stairs  # Steps : 15  Stairs Height: 6\"  Rails: Bilateral  Device: No Device  Assistance: Contact guard assistance  Comment: Patient sometimes forgets proper technique for managing the stairs and asks for guidance     FIMS:      TRANSFERS  Bed, Chair, Wheel Chair: 4 - Requires steadying assistance only <25% assist  and/or requires assist with one leg only   LOCOMOTION  Primary Mode: Walk  Distance Walked: 200 ft  Walk: 5 - Supervision Requires standby supervision or cuing to walk at least 150 feet  Stairs: 4- Minimal Contact Assistance Perfoms 75% or more of the effort to go up and down one flight of stairs       BALANCE Posture: Fair  Sitting - Static: Good(EOM, no support)  Sitting - Dynamic: Fair;+(EOM, no support)  Standing - Static: Fair;+(RW)  Standing - Dynamic: Fair(RW)  Comments: uses RW    EXERCISES    Other exercises?: Yes  Other exercises 1: NuStep: 10 min, L2  Other exercises 2: Seated BLE EX: 1.5# on L LE / lime resistance band, 10-15x each(requires assistance with R LE)  Other exercises 3: Supine bilateral LE, 10-15x each / to pt tolerance  Other exercises 4: Toilet transfer CGA   Followed Hip Precautions for all exercises        Activity Tolerance: Patient limited by pain, Patient limited by fatigue  Activity Tolerance: Frequent rest breaks needed d/t pain or fatigue  PT Equipment Recommendations  Equipment Needed: No  Other Comments  Comments: Difficult to keep pt on task due to excessive talking. Current Treatment Recommendations: Strengthening, ROM, Balance Training, Transfer Training, Gait Training, Stair training, Neuromuscular Re-education, Functional Mobility Training, Home Exercise Program, Safety Education & Training, Patient/Caregiver Education & Training, Wheelchair Mobility Training, Equipment Evaluation, Education, & procurement    Conditions Requiring Skilled Therapeutic Intervention  Body structures, Functions, Activity limitations: Decreased functional mobility ; Decreased ADL status; Decreased ROM; Decreased strength  Assessment: Impaired mobility due to L Hemiarthroplasty secondary to Hip Fx. Pt would benefit from additional PT to increase strength, endurance, and independence with functional mobility.    Treatment Diagnosis: difficulty walking R26.2  Prognosis: Good  REQUIRES PT FOLLOW UP: Yes  Discharge Recommendations: Home with assist PRN;Home with Home health PT    Goals  Short term goals  Time Frame for Short term goals: by 1 week  Short term goal 1: supervision bed mobility  Short term goal 2:

## 2019-05-29 NOTE — CARE COORDINATION
Patient is not eligible for the Medicare CCJR Bundle Initiative due to current enrollment within [facility]. -  [facility]). CCJR Episode resolved. Joint flowsheet updated.  -NR

## 2019-05-29 NOTE — PLAN OF CARE
Problem: Falls - Risk of:  Goal: Will remain free from falls  Description  Will remain free from falls  5/29/2019 0439 by Clarissa Cr RN  Outcome: Met This Shift     Problem: Falls - Risk of:  Goal: Absence of physical injury  Description  Absence of physical injury  5/29/2019 0439 by Clarissa Cr RN  Outcome: Met This Shift     Problem: Pain:  Goal: Control of acute pain  Description  Control of acute pain  5/29/2019 0440 by Clarissa Cr RN  Outcome: Ongoing

## 2019-05-29 NOTE — PLAN OF CARE
Problem: Falls - Risk of:  Goal: Will remain free from falls  Description  Will remain free from falls  5/29/2019 1140 by Ginette Blair RN  Outcome: Ongoing  Note:   Pt educated on and verbalizes understanding of fall risks. Pt wearing nonskid stockings, uses assistive devices appropriately , encouraged to ask for assistance. Will continue to monitor and intervene as needed. Problem: Risk for Impaired Skin Integrity  Goal: Tissue integrity - skin and mucous membranes  Description  Structural intactness and normal physiological function of skin and  mucous membranes. 5/29/2019 1140 by Ginette Blair RN  Outcome: Ongoing  Note:   Pt educated on risks for impaired skin integrity. Pt assisted in keeping skin clean and dry, assisted and prompted to reposition frequently. Will continue to monitor and intervene as needed. Problem: Pain:  Goal: Control of acute pain  Description  Control of acute pain  5/29/2019 1140 by Ginette Blair RN  Outcome: Ongoing  Note:   Pt medicated per pt reports and MD orders. Will continue to monitor and medicate as needed.

## 2019-05-29 NOTE — PROGRESS NOTES
[address]   ACUTE REHABILITATION OCCUPATIONAL THERAPY  DAILY NOTE    Date: 19  Patient Name: Ronal Zapata      Room: 3506/0171-45    MRN: [id number]   : 1935  (80 y.o.)  Gender: female   Referring Practitioner: Robert Kidd / Simone Christensen / Loreatha Holter   (rehab)       Dr. 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 : (R hip hemiarthroplasty 19; L MARYANN)  Hip Precautions: No hip flexion > 90 degrees, No hip extension, No hip external rotation, No ADduction  Other position/activity restrictions: Posterolateral approach - no hip flexion greater than 90 degrees, no hip adduction across midline, no hip hyperextension, and no external rotation. Right Lower Extremity Weight Bearing: Weight Bearing As Tolerated  Required Braces or Orthoses?: No    Subjective  Subjective: \"I don't wear them. They irritate my skin and I have a lot of skin issues already\" referring to TEDs; see ADL noted. Comments: Pt very slow moving c tasks, req increased time.  Pt req cues for re attention to task, perversates on topics/hyper verbal.   Patient Currently in Pain: Yes  Pain Level: 6  Pain Location: Back  Restrictions/Precautions: Fall Risk;Weight Bearing  Overall Orientation Status: Within Functional Limits  Orientation Level: Oriented to person;Oriented to situation;Oriented to place     Pain Assessment  Pain Assessment: Off Unit  Pain Level: 6  Pain Type: Acute pain  Pain Location: Back  Response to Pain Intervention: Patient Satisfied    Objective  Perception  Overall Perceptual Status: Impaired  Perseveration: Perseverates during conversation  Balance  Sitting Balance: Modified independent      Standing Balance  Comment: mobility from bed to w/c  Functional Mobility  Functional - Mobility Device: Rolling able to care for herself   Patient Education: reviewed hip precautions and safety while transferring and performing self care tasks  Learner:patient  Method: demonstration and explanation       Outcome: needs reinforcement     Plan  Plan  Times per week: 900/7   Times per day: Twice a day  Current Treatment Recommendations: Self-Care / ADL, Balance Training, Functional Mobility Training, Endurance Training, Safety Education & Training, Patient/Caregiver Education & Training, Positioning, Strengthening, ROM, Cognitive Reorientation, Home Management Training  Plan Comment: continue OT  Patient Goals   Patient goals : To Return Home able to care for herself   Short term goals  Time Frame for Short term goals: 1 week   Short term goal 1: Pt will V/D Good understanding of AE/DME/modified techniques for increased IND with self-care and mobility. Short term goal 2: Pt will actively participate in 10+ minutes of self-care to the best of Pt's ability to promote increased IND with self-care. Short term goal 3: Pt will perform bed mobility with Min A to sit EOB for increased participation with self-care. Short term goal 4: Pt will perform sit to stand transfer with Min A with RW with Good safety awareness. Short term goal 5: Pt will actively participate in 10-15 minutes in therapeutic exercise/functional activities to promote increased IND with self-care and mobility.   Long term goals  Time Frame for Long term goals : By Discharge   Long term goal 1: D/V understanding of Joint protection strategies with application to care needs   Long term goal 2: Mod I for basic premorbid self care tasks    Long term goal 3: Tolerate standing for 10 minutes for self care and home management tasks         05/29/19 1347   OT Individual Minutes   Time In 1257   Time Out 1330   Minutes 33     Electronically signed by DENISSE Perry on 5/29/19 at 4:36 PM

## 2019-05-29 NOTE — PLAN OF CARE
Nutrition Problem: Increased nutrient needs  Intervention: Food and/or Nutrient Delivery: Continue current diet  Nutritional Goals: PO intake % of meals

## 2019-05-29 NOTE — PROGRESS NOTES
Nutrition Assessment    Type and Reason for Visit: Reassess    Nutrition Recommendations: Continue diet as ordered. Nutrition Assessment: Patient stable from a nutritional standpoint as now consuming % of meals, weight stable. Will continue current plan of care. Malnutrition Assessment:  · Malnutrition Status: No malnutrition  · Context: Acute illness or injury  · Findings of the 6 clinical characteristics of malnutrition (Minimum of 2 out of 6 clinical characteristics is required to make the diagnosis of moderate or severe Protein Calorie Malnutrition based on AND/ASPEN Guidelines):  1. Weight Loss-No significant weight loss,    2. Fat Loss-No significant subcutaneous fat loss,    3. Muscle Loss-No significant muscle mass loss,    4. Fluid Accumulation-No significant fluid accumulation,    5.  Strength-Not measured    Nutrition Risk Level: Moderate    Nutrient Needs:  · Estimated Daily Total Kcal: 1475 kcal based on 25 kcal/kg admission wt  · Estimated Daily Protein (g): 65-71 gm pro based on 1.1-1.2 gm pro/kg IBW    Nutrition Diagnosis:   · Problem: Increased nutrient needs  · Etiology: related to Other (Comment)(Hip repair)     Signs and symptoms:  as evidenced by Other (Comment)(increased needs for healing)    Objective Information:  · Nutrition-Focused Physical Findings: Edema: +1 RLE. trace LLE.  GI: active bowel sounds, last bowel movement 5/28  · Wound Type: Surgical Wound  · Current Nutrition Therapies:  · Oral Diet Orders: General   · Oral Diet intake: %  · Oral Nutrition Supplement (ONS) Orders: None  · Anthropometric Measures:  · Ht: 4' 11\" (149.9 cm)   · Current Body Wt: 130 lb (59 kg)  · Admission Body Wt: 130 lb (59 kg)  · Ideal Body Wt: 105 lb (47.6 kg)(used high end of range), % Ideal Body 124%  · BMI Classification: BMI 25.0 - 29.9 Overweight    Nutrition Interventions:   Continue current diet  Continued Inpatient Monitoring, Education Not Indicated    Nutrition

## 2019-05-29 NOTE — PROGRESS NOTES
Writer spoke with  [facility] who gave verbal order to remove pt's aquacel and leave open to air if no drainage. If drainage is noted ordered to apply clean dressing and notify him.

## 2019-05-29 NOTE — PROGRESS NOTES
[facility] Internal Medicine    Progress Note    5/29/2019    3:54 PM    Name:   Fredrick Simon  MRN:     [id number]     [place]:      [de-identified]   Room:   [address] [place] [address] Day:  8  Admit Date:  5/21/2019  6:04 PM    PCP:   Anthony Menezes MD  Code Status:  Full Code    Subjective:     C/C: No chief complaint on file. Interval History Status: improved. HPI:   Patient has history of rheumatoid arthritis, chronically on steroids, osteoporosis. Patient had right hip fracture, after she sustained a fall.  status post surgery, doing better  5/29/19  Patient is bilateral leg edema  We are going to resume Lasix 20 mg daily which he was taking before  Review of Systems:     Constitutional:  negative for chills, fevers, sweats  Respiratory:  negative for cough, dyspnea on exertion, hemoptysis, shortness of breath, wheezing  Cardiovascular:  negative for chest pain, chest pressure/discomfort, lower extremity edema, palpitations  Gastrointestinal:  negative for abdominal pain, constipation, diarrhea, nausea, vomiting  Neurological:  negative for dizziness, headache  Extremity - Pain in Right Hip area   Medications: Allergies:     Allergies   Allergen Reactions    Methotrexate Derivatives Other (See Comments)     unknown    No Known Allergies     Seasonal Other (See Comments)     Congestion       Current Meds:   Scheduled Meds:    potassium chloride  40 mEq Oral Once    furosemide  20 mg Oral Daily    vitamin D  50,000 Units Oral Weekly    sennosides-docusate sodium  1 tablet Oral BID    lidocaine  1 patch Transdermal Daily    rivaroxaban  10 mg Oral Daily    ipratropium-albuterol  1 ampule Inhalation Q4H WA    levothyroxine  50 mcg Oral Daily    lidocaine  1 patch Transdermal Daily    losartan  25 mg Oral Daily    metoprolol tartrate  50 mg Oral BID    pantoprazole  40 mg Oral QAM AC    polyethylene glycol  17 g Oral Daily    predniSONE  10 mg Oral Daily appearance:  alert, cooperative and no distress, wheel chair Bound   Mental Status:  oriented to person, place and time and normal affect  Lungs:  clear to auscultation bilaterally, normal effort  Heart:  regular rate and rhythm, no murmur  Abdomen:  soft, nontender, nondistended, normal bowel sounds, no masses, hepatomegaly, splenomegaly  Extremities:  no edema, redness, tenderness in the calves  Skin:  no gross lesions, rashes, induration    Assessment:        Primary Problem  <principal problem not specified>    Active Hospital Problems    Diagnosis Date Noted    S/P right hip fracture [Z87.81] 05/22/2019    History of hemiarthroplasty of right hip [Z96.641] 05/21/2019    Rheumatoid arthritis ([facility] [address] [M06.9] 12/01/2014    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 Right hip fracture status post surgery  2. Rheumatoid arthritis, on steroids her home dose of prednisone was started  3. Patient is on DVT prophylaxis with Xarelto  4. Hypertension, controlled  5.  Hypothyroidism, patient is on Synthroid    5/25  Doing better   Wheel Chair Bound   On [facility]   5/26   Vitamin D is Low , started on Vitamin D Replacement   DOing better   On wheel chair   5/27  Doing better   On Vitamin D supplementation   5/29/19  Bilateral leg edema  Will resume Lasix 20 mg daily  Hugh Garza MD  5/29/2019  3:54 PM

## 2019-05-29 NOTE — PROGRESS NOTES
Physical Medicine & Rehabilitation  Progress Note    5/29/2019 2:49 PM     CC: Ambulatory and ADL dysfunction due to right hip fracture    Subjective:   Feels well. No complaints. Pain controlled. Bowels and bladder okay. ROS:  Denies fevers, chills, sweats. No chest pain, palpitations, lightheadedness. Denies coughing, wheezing or shortness of breath. Denies abdominal pain, nausea, diarrhea or constipation. No new areas of joint pain. Denies new areas of numbness or weakness. Denies new anxiety or depression issues. No new skin problems. Rehabilitation:       PT:  Restrictions/Precautions: Fall Risk, Weight Bearing  Implants present? : (R hip hemiarthroplasty 5/17/19; L MARYANN)  Hip Precautions: No hip flexion > 90 degrees, No hip extension, No hip external rotation, No ADduction  Other position/activity restrictions: Posterolateral approach - no hip flexion greater than 90 degrees, no hip adduction across midline, no hip hyperextension, and no external rotation. Right Lower Extremity Weight Bearing: Weight Bearing As Tolerated   Transfers  Sit to Stand: Stand by assistance(Pt demo's good hand placement today)  Stand to sit: Stand by assistance(Cues for hand placement with good return)  Bed to Chair: Contact guard assistance  Comment: RW  WB Status: WBAT R  Ambulation 1  Surface: level tile  Device: Rolling Walker  Assistance: Stand by assistance  Quality of Gait: slow pace, steady step-through pattern; pt verbalizes throughout \"heel (R LE) toe (L LE) push (RW)\" while ambulating with little correlation to actual movement. Heel strike about 50% B. Gait Deviations: Slow Diane  Distance: 200 ft  Comments: Extra time req'd as pt becomes distracted. >7 min.      Transfers  Sit to Stand: Stand by assistance(Pt demo's good hand placement today)  Stand to sit: Stand by assistance(Cues for hand placement with good return)  Bed to Chair: Contact guard assistance  Comment: RW  Ambulation  Ambulation?: Yes  WB Status: WBAT R  Ambulation 1  Surface: level tile  Device: Rolling Walker  Assistance: Stand by assistance  Quality of Gait: slow pace, steady step-through pattern; pt verbalizes throughout \"heel (R LE) toe (L LE) push (RW)\" while ambulating with little correlation to actual movement. Heel strike about 50% B. Gait Deviations: Slow Diane  Distance: 200 ft  Comments: Extra time req'd as pt becomes distracted. >7 min. Surface: level tile  Ambulation 1  Surface: level tile  Device: Rolling Walker  Assistance: Stand by assistance  Quality of Gait: slow pace, steady step-through pattern; pt verbalizes throughout \"heel (R LE) toe (L LE) push (RW)\" while ambulating with little correlation to actual movement. Heel strike about 50% B. Gait Deviations: Slow Diane  Distance: 200 ft  Comments: Extra time req'd as pt becomes distracted. >7 min. OT:  ADL  Equipment Provided: Reacher  Feeding: Setup  Grooming: Verbal cueing, Setup  UE Bathing: Setup, Verbal cueing  LE Bathing: Minimal assistance  UE Dressing: Contact guard assistance, Supervision, Setup  LE Dressing: Minimal assistance, Stand by assistance, Increased time to complete, Verbal cueing  Toileting: Minimal assistance  Additional Comments: Pt notes difficulty c  with arthritic flare ups. Writer educated on compensatory strategies such as coban (surface, dycem, BUE hand support. Coban applied and provided to patient. S.O. reports G understanding of fxl use. Writer educated on and offered built up handles for compensation c  during    Instrumental ADL's  Instrumental ADLs: Yes     Balance  Sitting Balance: Modified independent   Standing Balance: Supervision   Standing Balance  Time: AM: 15min x 3   Activity: AM: ADL tasks in room, mobility in room/bathroom.    Comment: No LOB, slow to ambulate, fair safety technique c RW  Functional Mobility  Functional - Mobility Device: Rolling Walker  Activity: To/from bathroom  Assist Level: Stand by assistance  Functional Mobility Comments: Verbal cues using RW to ambulate to bathroom. Pt ambulates using RW  to /from toilet w min assist.      Bed mobility  Bridging: Stand by assistance  Supine to Sit: Minimal assistance  Sit to Supine: Supervision  Scooting: Supervision  Transfers  Stand Step Transfers: Supervision  Sit to stand: Supervision  Stand to sit: Supervision  Transfer Comments: fair safety    Toilet Transfers  Toilet - Technique: Ambulating  Equipment Used: Raised toilet seat with rails  Toilet Transfer: Supervision  Toilet Transfers Comments: Pt. sat on toilet for LB dressing tasks due to raised toilet seat       Objective:  BP (!) 166/67   Pulse 85   Temp 97.7 °F (36.5 °C) (Oral)   Resp 16   Ht 4' 11\" (1.499 m)   Wt 130 lb (59 kg)   SpO2 99%   BMI 26.26 kg/m²  I Body mass index is 26.26 kg/m². I   Wt Readings from Last 1 Encounters:   19 130 lb (59 kg)      Temp (24hrs), Av °F (36.7 °C), Min:97.7 °F (36.5 °C), Max:98.2 °F (36.8 °C)         GEN: well developed, well nourished, no acute distress  HEENT: Normocephalic atraumatic, EOMI, mucous membranes pink and moist  CV: RRR, no murmurs, rubs or gallops  PULM: CTAB, no rales or rhonchi. Respirations WNL and unlabored  ABD: soft, NT, ND, +BS and equal  NEURO: A&O x3. Sensation intact to light touch. MSK: PROM within functional limits. UE, LLE, limited R prox, RA changes of hands, . EXTREMITIES: No calf tenderness to palpation bilaterally. No edema BLEs  SKIN: warm dry and intact with good turgor, right hip dressing -aqua swallow some mild drainage noted underneath  PSYCH: appropriately interactive. Affect WNL.   Good spirits    Medications   Scheduled Meds:   vitamin D  50,000 Units Oral Weekly    sennosides-docusate sodium  1 tablet Oral BID    lidocaine  1 patch Transdermal Daily    rivaroxaban  10 mg Oral Daily    ipratropium-albuterol  1 ampule Inhalation Q4H WA    levothyroxine  50 mcg Oral Daily    lidocaine  1 patch Transdermal Daily    losartan  25 mg Oral Daily    metoprolol tartrate  50 mg Oral BID    pantoprazole  40 mg Oral QAM AC    polyethylene glycol  17 g Oral Daily    predniSONE  10 mg Oral Daily     Continuous Infusions:  PRN Meds:.aluminum & magnesium hydroxide-simethicone, bisacodyl, magnesium hydroxide, acetaminophen, LORazepam, oxyCODONE-acetaminophen     Diagnostics:     CBC:   Recent Labs     05/27/19  0625   WBC 6.5   RBC 3.80*   HGB 10.2*   HCT 31.9*   MCV 84.1   RDW 17.0*        BMP:   Recent Labs     05/27/19  0625 05/29/19  0623     --    K 3.5* 3.4*     --    CO2 24  --    BUN 16  --    CREATININE <0.40*  --      BNP: No results for input(s): BNP in the last 72 hours. PT/INR: No results for input(s): PROTIME, INR in the last 72 hours. APTT: No results for input(s): APTT in the last 72 hours. CARDIAC ENZYMES: No results for input(s): CKMB, CKMBINDEX, TROPONINT in the last 72 hours. Invalid input(s): CKTOTAL;3  FASTING LIPID PANEL:No results found for: CHOL, HDL, TRIG  LIVER PROFILE: No results for input(s): AST, ALT, ALB, BILIDIR, BILITOT, ALKPHOS in the last 72 hours. I/O (24Hr): No intake or output data in the 24 hours ending 05/29/19 1449    Glu last 24 hour  Recent Labs     05/27/19  1038 05/27/19  1616 05/28/19  0709 05/28/19  1552   POCGLU 111* 168* 108* 156*       No results for input(s): CLARITYU, COLORU, PHUR, SPECGRAV, PROTEINU, RBCUA, BLOODU, BACTERIA, NITRU, WBCUA, LEUKOCYTESUR, YEAST, GLUCOSEU, BILIRUBINUR in the last 72 hours. Impression/Plan:    1. Mobility and ADL dysfunction secondary to right subcapitalfemur fracture with hemiarthroplasty-continue rehab efforts, team conf reviewed, discharge plan 6/4  2. Right subcapital femur fracture with hemiarthroplasty-orthopedics notified of dressing, cleared to remove Aquasol and monitor for drainage  3. Rheumatoid arthritis- prednisone  4. Postop anemia-monitor mproving  5.  Mildly elevated glucose-monitor,

## 2019-05-29 NOTE — PROGRESS NOTES
[address]   ACUTE REHABILITATION OCCUPATIONAL THERAPY  DAILY NOTE    Date: 19  Patient Name: Ronal Zapata      Room: 5662/7338-59    MRN: [id number]   : 1935  (80 y.o.)  Gender: female   Referring Practitioner: Robert Kidd / Simone Christensen / Loreatha Holter   (rehab)       Dr. 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 : (R hip hemiarthroplasty 19; L MARYANN)  Hip Precautions: No hip flexion > 90 degrees, No hip extension, No hip external rotation, No ADduction  Other position/activity restrictions: Posterolateral approach - no hip flexion greater than 90 degrees, no hip adduction across midline, no hip hyperextension, and no external rotation. Right Lower Extremity Weight Bearing: Weight Bearing As Tolerated  Required Braces or Orthoses?: No    Subjective  Subjective: Pt states she does not feel comfortable being on her own yet. Comments: Pt very slow moving c tasks, req increased time. Pt req cues for re attention to task, perversates on topics/hyper verbal.   Patient Currently in Pain: Yes  Pain Level: 6  Pain Location: Back  Restrictions/Precautions: Fall Risk;Weight Bearing  Overall Orientation Status: Within Functional Limits  Orientation Level: Oriented to person;Oriented to situation;Oriented to place     Pain Assessment  Pain Assessment: 0-10  Pain Level: 6  Pain Type: Acute pain  Pain Location: Back  Response to Pain Intervention: Patient Satisfied    Objective  Cognition  Overall Cognitive Status: Impaired  Attention Span: Attends with cues to redirect  Memory: Decreased short term memory  Following Commands:  Follows one step commands with repetition  Safety Judgement: Decreased awareness of need for safety  Awareness of Errors: Decreased awareness of errors  Insights: Decreased awareness of deficits  Sequencing and Organization: Assistance required to implement solutions;Assistance required to generate solutions;Assistance required to identify errors made  Perception  Overall Perceptual Status: Impaired  Perseveration: Perseverates during conversation  Balance  Sitting Balance: Modified independent   Standing Balance: Supervision  Bed mobility  Bridging: Stand by assistance  Scooting: Supervision  Transfers  Stand Step Transfers: Supervision  Sit to stand: Supervision  Stand to sit: Supervision  Transfer Comments: fair safety   Standing Balance  Time: AM: 15min x 3   Activity: AM: ADL tasks in room, mobility in room/bathroom. Functional Mobility  Functional - Mobility Device: Rolling Walker  Activity: To/from bathroom  Assist Level: Stand by assistance  Functional Mobility Comments: Verbal cues using RW to ambulate to bathroom. Pt ambulates using RW  to /from toilet w min assist.   Toilet Transfers  Toilet - Technique: Ambulating  Equipment Used: Raised toilet seat with rails  Toilet Transfer: Supervision  Toilet Transfers Comments: Pt. sat on toilet for LB dressing tasks due to raised toilet seat                          Vision  Vision: Impaired  Vision Exceptions: Wears glasses at all times  Vision - Basic Assessment  Prior Vision: Wears glasses all the time  Visual History: Cataracts; Corrective eye surgery  Patient Visual Report: No visual complaint reported. Vision  Patient Visual Report: No visual complaint reported. OT FIM:   Eatin - Feeds self with setup/supervision/cues and/or requires only setup/supervision/cues to perform tube feedings  Groomin - Requires setup/cues to do all tasks  Bathin - Able to bathe 8-9 areas  Dressing-Upper: 5 - Requires setup/supervision/cues and/or requires assist with presthesis/brace only  Dressing-Lower: 3 - Requires assist with 2-3 parts of dressing(required assistance to bunch clothes in order to use reacher to pull up.  Also required assistance to thread briefs and shorts through legs. Once standing and steady, pt. was able to pull clothes up )  Toiletin - Requires setup/supervision/cues  Toilet Transfer: 4 - CGA    Social Interaction: 5 - Patient is appropriate with supervision/cues          Assessment  Performance deficits / Impairments: Decreased functional mobility ; Decreased ADL status; Decreased ROM; Decreased strength;Decreased safe awareness;Decreased cognition;Decreased endurance;Decreased balance;Decreased high-level IADLs;Decreased coordination  Assessment: See FIM for ADLs. Prognosis: Good  Discharge Recommendations: Home with assist PRN;Home with Home health OT; Home with nursing aide  Activity Tolerance: Patient Tolerated treatment well  Safety Devices in place: Yes  Type of devices: Gait belt;Patient at risk for falls;Call light within reach;Nurse notified  Equipment Recommendations  Equipment Needed: Yes  Reacher: for LBD  Sock Aid: X  Other: Long Handled Sponge       Patient Education:  Patient Goals   Patient goals : To Return Home able to care for herself   Patient Education: reviewed hip precautions and safety while transferring and performing self care tasks  Learner:patient  Method: demonstration and explanation       Outcome: demonstrated understanding     Plan  Plan  Times per week: 900/7   Times per day: Twice a day  Current Treatment Recommendations: Self-Care / ADL, Balance Training, Functional Mobility Training, Endurance Training, Safety Education & Training, Patient/Caregiver Education & Training, Positioning, Strengthening, ROM, Cognitive Reorientation, Home Management Training  Plan Comment: continue OT  Patient Goals   Patient goals : To Return Home able to care for herself   Short term goals  Time Frame for Short term goals: 1 week   Short term goal 1: Pt will V/D Good understanding of AE/DME/modified techniques for increased IND with self-care and mobility.   Short term goal 2: Pt will actively participate in 10+ minutes of self-care to the best of Pt's ability to promote increased IND with self-care. Short term goal 3: Pt will perform bed mobility with Min A to sit EOB for increased participation with self-care. Short term goal 4: Pt will perform sit to stand transfer with Min A with RW with Good safety awareness. Short term goal 5: Pt will actively participate in 10-15 minutes in therapeutic exercise/functional activities to promote increased IND with self-care and mobility.   Long term goals  Time Frame for Long term goals : By Discharge   Long term goal 1: D/V understanding of Joint protection strategies with application to care needs   Long term goal 2: Mod I for basic premorbid self care tasks    Long term goal 3: Tolerate standing for 10 minutes for self care and home management tasks         05/29/19 1053   OT Individual Minutes   Time In 0830   Time Out 0930   Minutes 60          Electronically signed by Leisa Mortimer, OTA on 5/29/19 at 10:59 AM

## 2019-05-30 LAB
EKG ATRIAL RATE: 84 BPM
EKG P AXIS: 67 DEGREES
EKG P-R INTERVAL: 150 MS
EKG Q-T INTERVAL: 378 MS
EKG QRS DURATION: 90 MS
EKG QTC CALCULATION (BAZETT): 446 MS
EKG R AXIS: 10 DEGREES
EKG T AXIS: 23 DEGREES
EKG VENTRICULAR RATE: 84 BPM
GLUCOSE BLD-MCNC: 137 MG/DL (ref 65–105)
GLUCOSE BLD-MCNC: 139 MG/DL (ref 65–105)
GLUCOSE BLD-MCNC: 142 MG/DL (ref 65–105)
POTASSIUM SERPL-SCNC: 3.5 MMOL/L (ref 3.7–5.3)

## 2019-05-30 PROCEDURE — 97530 THERAPEUTIC ACTIVITIES: CPT

## 2019-05-30 PROCEDURE — 97110 THERAPEUTIC EXERCISES: CPT

## 2019-05-30 PROCEDURE — 6370000000 HC RX 637 (ALT 250 FOR IP): Performed by: INTERNAL MEDICINE

## 2019-05-30 PROCEDURE — 6370000000 HC RX 637 (ALT 250 FOR IP): Performed by: PHYSICAL MEDICINE & REHABILITATION

## 2019-05-30 PROCEDURE — 82947 ASSAY GLUCOSE BLOOD QUANT: CPT

## 2019-05-30 PROCEDURE — 94640 AIRWAY INHALATION TREATMENT: CPT

## 2019-05-30 PROCEDURE — 6370000000 HC RX 637 (ALT 250 FOR IP): Performed by: ORTHOPAEDIC SURGERY

## 2019-05-30 PROCEDURE — 97116 GAIT TRAINING THERAPY: CPT

## 2019-05-30 PROCEDURE — 36415 COLL VENOUS BLD VENIPUNCTURE: CPT

## 2019-05-30 PROCEDURE — 1180000000 HC REHAB R&B

## 2019-05-30 PROCEDURE — 97535 SELF CARE MNGMENT TRAINING: CPT

## 2019-05-30 PROCEDURE — 99232 SBSQ HOSP IP/OBS MODERATE 35: CPT | Performed by: PHYSICAL MEDICINE & REHABILITATION

## 2019-05-30 PROCEDURE — 84132 ASSAY OF SERUM POTASSIUM: CPT

## 2019-05-30 PROCEDURE — 99231 SBSQ HOSP IP/OBS SF/LOW 25: CPT | Performed by: INTERNAL MEDICINE

## 2019-05-30 PROCEDURE — 94761 N-INVAS EAR/PLS OXIMETRY MLT: CPT

## 2019-05-30 RX ORDER — POTASSIUM CHLORIDE 20 MEQ/1
20 TABLET, EXTENDED RELEASE ORAL ONCE
Status: COMPLETED | OUTPATIENT
Start: 2019-05-30 | End: 2019-05-30

## 2019-05-30 RX ORDER — FUROSEMIDE 20 MG/1
20 TABLET ORAL DAILY PRN
Status: DISCONTINUED | OUTPATIENT
Start: 2019-05-30 | End: 2019-06-04 | Stop reason: HOSPADM

## 2019-05-30 RX ADMIN — METOPROLOL TARTRATE 50 MG: 50 TABLET ORAL at 08:14

## 2019-05-30 RX ADMIN — FUROSEMIDE 20 MG: 20 TABLET ORAL at 08:14

## 2019-05-30 RX ADMIN — OXYCODONE HYDROCHLORIDE AND ACETAMINOPHEN 1 TABLET: 5; 325 TABLET ORAL at 11:42

## 2019-05-30 RX ADMIN — RIVAROXABAN 10 MG: 10 TABLET, FILM COATED ORAL at 17:54

## 2019-05-30 RX ADMIN — IPRATROPIUM BROMIDE AND ALBUTEROL SULFATE 1 AMPULE: .5; 3 SOLUTION RESPIRATORY (INHALATION) at 16:16

## 2019-05-30 RX ADMIN — METOPROLOL TARTRATE 50 MG: 50 TABLET ORAL at 21:07

## 2019-05-30 RX ADMIN — OXYCODONE HYDROCHLORIDE AND ACETAMINOPHEN 1 TABLET: 5; 325 TABLET ORAL at 17:56

## 2019-05-30 RX ADMIN — PANTOPRAZOLE SODIUM 40 MG: 40 TABLET, DELAYED RELEASE ORAL at 05:20

## 2019-05-30 RX ADMIN — LOSARTAN POTASSIUM 25 MG: 25 TABLET ORAL at 08:14

## 2019-05-30 RX ADMIN — OXYCODONE HYDROCHLORIDE AND ACETAMINOPHEN 1 TABLET: 5; 325 TABLET ORAL at 05:22

## 2019-05-30 RX ADMIN — PREDNISONE 10 MG: 10 TABLET ORAL at 08:14

## 2019-05-30 RX ADMIN — LEVOTHYROXINE SODIUM 50 MCG: 50 TABLET ORAL at 05:20

## 2019-05-30 RX ADMIN — LORAZEPAM 0.5 MG: 0.5 TABLET ORAL at 17:56

## 2019-05-30 RX ADMIN — LORAZEPAM 0.5 MG: 0.5 TABLET ORAL at 11:42

## 2019-05-30 RX ADMIN — POTASSIUM CHLORIDE 20 MEQ: 20 TABLET, EXTENDED RELEASE ORAL at 17:54

## 2019-05-30 RX ADMIN — IPRATROPIUM BROMIDE AND ALBUTEROL SULFATE 1 AMPULE: .5; 3 SOLUTION RESPIRATORY (INHALATION) at 11:46

## 2019-05-30 RX ADMIN — LORAZEPAM 0.5 MG: 0.5 TABLET ORAL at 00:58

## 2019-05-30 RX ADMIN — IPRATROPIUM BROMIDE AND ALBUTEROL SULFATE 1 AMPULE: .5; 3 SOLUTION RESPIRATORY (INHALATION) at 21:04

## 2019-05-30 RX ADMIN — SENNOSIDES,DOCUSATE SODIUM 1 TABLET: 8.6; 5 TABLET, FILM COATED ORAL at 21:07

## 2019-05-30 ASSESSMENT — PAIN DESCRIPTION - ORIENTATION: ORIENTATION: RIGHT

## 2019-05-30 ASSESSMENT — PAIN SCALES - GENERAL
PAINLEVEL_OUTOF10: 6
PAINLEVEL_OUTOF10: 0
PAINLEVEL_OUTOF10: 6
PAINLEVEL_OUTOF10: 6
PAINLEVEL_OUTOF10: 7
PAINLEVEL_OUTOF10: 7
PAINLEVEL_OUTOF10: 8
PAINLEVEL_OUTOF10: 8

## 2019-05-30 ASSESSMENT — PAIN DESCRIPTION - FREQUENCY: FREQUENCY: INTERMITTENT

## 2019-05-30 ASSESSMENT — PAIN DESCRIPTION - DESCRIPTORS: DESCRIPTORS: CRAMPING;DISCOMFORT

## 2019-05-30 ASSESSMENT — PAIN DESCRIPTION - LOCATION: LOCATION: HIP

## 2019-05-30 NOTE — PLAN OF CARE
Problem: Pain:  Goal: Pain level will decrease  Description  Pain level will decrease  Outcome: Ongoing  Goal: Control of acute pain  Description  Control of acute pain  5/30/2019 1831 by Nevaeh Jacobsen RN  Outcome: Ongoing  5/30/2019 0501 by Feliberto Sweeney RN  Outcome: Ongoing  Goal: Control of chronic pain  Description  Control of chronic pain  Outcome: Ongoing     Problem: Risk for Impaired Skin Integrity  Goal: Tissue integrity - skin and mucous membranes  Description  Structural intactness and normal physiological function of skin and  mucous membranes.   5/30/2019 1831 by Nevaeh Jacobsen RN  Outcome: Ongoing  5/30/2019 0501 by Feliberto Sweeney RN  Outcome: Met This Shift     Problem: Falls - Risk of:  Goal: Will remain free from falls  Description  Will remain free from falls  5/30/2019 1831 by Nevaeh Jacobsen RN  Outcome: Ongoing  5/30/2019 0501 by Feliberto Sweeney RN  Outcome: Met This Shift  Goal: Absence of physical injury  Description  Absence of physical injury  5/30/2019 1831 by Nevaeh Jacobsen RN  Outcome: Ongoing  5/30/2019 0501 by Feliberto Sweeney RN  Outcome: Met This Shift     Problem: Nutrition  Goal: Optimal nutrition therapy  Outcome: Ongoing

## 2019-05-30 NOTE — PROGRESS NOTES
[facility] Internal Medicine    Progress Note    5/30/2019    3:56 PM    Name:   Lucia Casas  MRN:     [id number]     Maylinlyside:      [de-identified]   Room:   [address] [place] [address] Day:  9  Admit Date:  5/21/2019  6:04 PM    PCP:   Jerardo Woodruff MD  Code Status:  Full Code    Subjective:     C/C: No chief complaint on file. Interval History Status: improved. HPI:   Patient has history of rheumatoid arthritis, chronically on steroids, osteoporosis. Patient had right hip fracture, after she sustained a fall.  status post surgery, doing better  5/29/19  Patient is bilateral leg edema  We are going to resume Lasix 20 mg daily which he was taking before  5/30/19  Reduce lasix to prn . Leg edma improved   Review of Systems:     Constitutional:  negative for chills, fevers, sweats  Respiratory:  negative for cough, dyspnea on exertion, hemoptysis, shortness of breath, wheezing  Cardiovascular:  negative for chest pain, chest pressure/discomfort, lower extremity edema, palpitations  Gastrointestinal:  negative for abdominal pain, constipation, diarrhea, nausea, vomiting  Neurological:  negative for dizziness, headache  Extremity - Pain in Right Hip area   Medications: Allergies:     Allergies   Allergen Reactions    Methotrexate Derivatives Other (See Comments)     unknown    No Known Allergies     Seasonal Other (See Comments)     Congestion       Current Meds:   Scheduled Meds:    furosemide  20 mg Oral Daily    vitamin D  50,000 Units Oral Weekly    sennosides-docusate sodium  1 tablet Oral BID    lidocaine  1 patch Transdermal Daily    rivaroxaban  10 mg Oral Daily    ipratropium-albuterol  1 ampule Inhalation Q4H WA    levothyroxine  50 mcg Oral Daily    lidocaine  1 patch Transdermal Daily    losartan  25 mg Oral Daily    metoprolol tartrate  50 mg Oral BID    pantoprazole  40 mg Oral QAM AC    polyethylene glycol  17 g Oral Daily    predniSONE  10 mg Oral Daily     Continuous Infusions:   PRN Meds: LORazepam, oxyCODONE-acetaminophen, aluminum & magnesium hydroxide-simethicone, bisacodyl, magnesium hydroxide, acetaminophen    Data:     Past Medical History:   has a past medical history of Allergic rhinitis, Anemia, Anxiety, Asthma, Cataract, COPD (chronic obstructive pulmonary disease) ([address] CTS (carpal tunnel syndrome), Esophageal reflux, Headache(784.0), Hyperlipidemia, Hypertension, Hypothyroidism, Osteoarthritis, Osteoporosis, and Rheumatoid arthritis(714.0). Social History:   reports that she has never smoked. She has never used smokeless tobacco. She reports that she does not drink alcohol or use drugs. Family History:   Family History   Problem Relation Age of Onset   Lilibeth Spitz Cancer Mother     Hypertension Mother     Heart Disease Mother     Stroke Maternal Grandmother     Heart Disease Maternal Grandmother     Cancer Paternal Grandmother     Heart Disease Paternal Grandmother     Heart Disease Father     Heart Disease Maternal Grandfather     Heart Disease Paternal Grandfather        Vitals:  BP (!) 147/68   Pulse 85   Temp 98.4 °F (36.9 °C) (Oral)   Resp 16   Ht 4' 11\" (1.499 m)   Wt 130 lb (59 kg)   SpO2 98%   BMI 26.26 kg/m²   Temp (24hrs), Av.3 °F (36.8 °C), Min:98.1 °F (36.7 °C), Max:98.4 °F (36.9 °C)    Recent Labs     19  0709 19  1552 19  1613 19  1052   POCGLU 108* 156* 152* 142*       I/O (24Hr):   No intake or output data in the 24 hours ending 19 1556    Labs:    [unfilled]    Lab Results   Component Value Date/Time    SPECIAL NOT REPORTED 2016 09:16 PM     Lab Results   Component Value Date/Time    CULTURE NORMAL URO-GENITAL SELVIN 2016 09:16 PM    CULTURE NEGATIVE FOR NEISSERIA GONORRHOEAE 2016 09:16 PM    CULTURE  2016 09:16 PM     [employer] [id number] [facility], [address] [place] [address] [phone]       [facility]    Radiology:      Physical Examination:        General appearance:  alert, cooperative and no distress, wheel chair Bound   Mental Status:  oriented to person, place and time and normal affect  Lungs:  clear to auscultation bilaterally, normal effort  Heart:  regular rate and rhythm, no murmur  Abdomen:  soft, nontender, nondistended, normal bowel sounds, no masses, hepatomegaly, splenomegaly  Extremities:  no edema, redness, tenderness in the calves  Skin:  no gross lesions, rashes, induration    Assessment:        Primary Problem  <principal problem not specified>    Active Hospital Problems    Diagnosis Date Noted    Bilateral leg edema [R60.0]     S/P right hip fracture [Z87.81] 05/22/2019    History of hemiarthroplasty of right hip [Z96.641] 05/21/2019    Rheumatoid arthritis ([facility] [address] [M06.9] 12/01/2014    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 Right hip fracture status post surgery  2. Rheumatoid arthritis, on steroids her home dose of prednisone was started  3. Patient is on DVT prophylaxis with Xarelto  4. Hypertension, controlled  5.  Hypothyroidism, patient is on Synthroid    5/25  Doing better   Wheel Chair Bound   On [facility]   5/26   Vitamin D is Low , started on Vitamin D Replacement   DOing better   On wheel chair   5/27  Doing better   On Vitamin D supplementation   5/29/19  Bilateral leg edema  Will resume Lasix 20 mg daily  Yanci Beverly MD  5/30/2019  3:56 PM

## 2019-05-30 NOTE — PLAN OF CARE
Problem: Risk for Impaired Skin Integrity  Goal: Tissue integrity - skin and mucous membranes  Description  Structural intactness and normal physiological function of skin and  mucous membranes.   Outcome: Met This Shift     Problem: Falls - Risk of:  Goal: Will remain free from falls  Description  Will remain free from falls  Outcome: Met This Shift     Problem: Falls - Risk of:  Goal: Absence of physical injury  Description  Absence of physical injury  Outcome: Met This Shift     Problem: Pain:  Goal: Control of acute pain  Description  Control of acute pain  Outcome: Ongoing

## 2019-05-30 NOTE — PROGRESS NOTES
Physical Medicine & Rehabilitation  Progress Note    5/30/2019 5:34 PM     CC: Ambulatory and ADL dysfunction due to right hip fracture    Subjective:   Feels well. No complaints. Pain controlled. Bowels and bladder okay.  concerned of continued Lasix-causing urination interfering with therapies    ROS:  Denies fevers, chills, sweats. No chest pain, palpitations, lightheadedness. Denies coughing, wheezing or shortness of breath. Denies abdominal pain, nausea, diarrhea or constipation. No new areas of joint pain. Denies new areas of numbness or weakness. Denies new anxiety or depression issues. No new skin problems. Rehabilitation:       PT:  Restrictions/Precautions: Fall Risk, Weight Bearing  Implants present? : (R hip hemiarthroplasty 5/17/19; L MARYANN)  Hip Precautions: No hip flexion > 90 degrees, No hip extension, No hip external rotation, No ADduction  Other position/activity restrictions: Posterolateral approach - no hip flexion greater than 90 degrees, no hip adduction across midline, no hip hyperextension, and no external rotation.   Right Lower Extremity Weight Bearing: Weight Bearing As Tolerated   Transfers  Sit to Stand: Stand by assistance(Cues for hand placement with good return)  Stand to sit: Stand by assistance(Cues for hand placement with good return)  Bed to Chair: Stand by assistance  Comment: RW  WB Status: WBAT R  Ambulation 1  Surface: level tile  Device: Rolling Walker  Assistance: Stand by assistance  Quality of Gait: slow, steady pace; hip and knee flexion; step-through pattern; pt verbalizes \"heel, toe, push\" while ambulating  Gait Deviations: Slow Diane  Distance: 200 ft  Comments: Verbal cues given for tall posture / less felexion in hips and knees, with good return    Transfers  Sit to Stand: Stand by assistance(Cues for hand placement with good return)  Stand to sit: Stand by assistance(Cues for hand placement with good return)  Bed to Chair: Stand by assistance  Comment: RW  Ambulation  Ambulation?: Yes  WB Status: WBAT R  Ambulation 1  Surface: level tile  Device: Rolling Walker  Assistance: Stand by assistance  Quality of Gait: slow, steady pace; hip and knee flexion; step-through pattern; pt verbalizes \"heel, toe, push\" while ambulating  Gait Deviations: Slow Diane  Distance: 200 ft  Comments: Verbal cues given for tall posture / less felexion in hips and knees, with good return    Surface: level tile  Ambulation 1  Surface: level tile  Device: Rolling Walker  Assistance: Stand by assistance  Quality of Gait: slow, steady pace; hip and knee flexion; step-through pattern; pt verbalizes \"heel, toe, push\" while ambulating  Gait Deviations: Slow Diane  Distance: 200 ft  Comments: Verbal cues given for tall posture / less felexion in hips and knees, with good return        OT:  ADL  Equipment Provided: Reacher  Feeding: Setup  Grooming: Verbal cueing, Setup  UE Bathing: Setup, Verbal cueing  LE Bathing: Minimal assistance  UE Dressing: Contact guard assistance, Supervision, Setup  LE Dressing: Minimal assistance, Stand by assistance, Increased time to complete, Verbal cueing  Toileting: Minimal assistance  Additional Comments: Pt notes difficulty c  with arthritic flare ups. Writer educated on compensatory strategies such as coban (surface, dycem, BUE hand support. Coban applied and provided to patient. S.O. reports G understanding of fxl use. Writer educated on and offered built up handles for compensation c  during fxl tasks, pt declined, stated willing to wrap coban if needed. May still benefit from built up handles and education.    Instrumental ADL's  Instrumental ADLs: Yes     Balance  Sitting Balance: Modified independent   Standing Balance: Modified independent    Standing Balance  Time: AM: 15min x 3   Activity: ADL tasks, mobility in room/bathroom for item retreival  Comment: mobility from bed to w/c  Functional Mobility  Functional - Mobility Device: Rolling Walker  Activity: Other, [employer], Transport items, To/from bathroom  Assist Level: Supervision  Functional Mobility Comments: Verbal cues using RW to ambulate. Bed mobility  Bridging: Stand by assistance  Supine to Sit: Modified independent(increased time. )  Sit to Supine: Supervision  Scooting: Supervision  Transfers  Stand Step Transfers: Supervision  Sit to stand: Modified independent  Stand to sit: Modified independent  Transfer Comments: fair safety    Toilet Transfers  Toilet - Technique: Ambulating  Equipment Used: Raised toilet seat with rails  Toilet Transfer: Modified independent  Toilet Transfers Comments: Pt. sat on toilet for LB dressing tasks due to raised toilet seat       Objective:  BP (!) 147/68   Pulse 85   Temp 98.4 °F (36.9 °C) (Oral)   Resp 16   Ht 4' 11\" (1.499 m)   Wt 130 lb (59 kg)   SpO2 97%   BMI 26.26 kg/m²  I Body mass index is 26.26 kg/m². I   Wt Readings from Last 1 Encounters:   19 130 lb (59 kg)      Temp (24hrs), Av.3 °F (36.8 °C), Min:98.1 °F (36.7 °C), Max:98.4 °F (36.9 °C)         GEN: well developed, well nourished, no acute distress  HEENT: Normocephalic atraumatic, EOMI, mucous membranes pink and moist  CV: RRR, no murmurs, rubs or gallops  PULM: CTAB, no rales or rhonchi. Respirations WNL and unlabored  ABD: soft, NT, ND, +BS and equal  NEURO: A&O x3. Sensation intact to light touch. MSK: PROM within functional limits. UE, LLE, limited R prox, RA changes of hands, . EXTREMITIES: No calf tenderness to palpation bilaterally. 1+ pitting edema BLEs  SKIN: warm dry and intact with good turgor, right hip incision clean and intact, no signs or symptoms fraction  PSYCH: appropriately interactive. Affect WNL.   Good spirits    Medications   Scheduled Meds:   vitamin D  50,000 Units Oral Weekly    sennosides-docusate sodium  1 tablet Oral BID    lidocaine  1 patch Transdermal Daily    rivaroxaban  10 mg Oral Daily    ipratropium-albuterol  1 ampule Inhalation Q4H WA    levothyroxine  50 mcg Oral Daily    lidocaine  1 patch Transdermal Daily    losartan  25 mg Oral Daily    metoprolol tartrate  50 mg Oral BID    pantoprazole  40 mg Oral QAM AC    polyethylene glycol  17 g Oral Daily    predniSONE  10 mg Oral Daily     Continuous Infusions:  PRN Meds:.furosemide, LORazepam, oxyCODONE-acetaminophen, aluminum & magnesium hydroxide-simethicone, bisacodyl, magnesium hydroxide, acetaminophen     Diagnostics:     CBC:   No results for input(s): WBC, RBC, HGB, HCT, MCV, RDW, PLT in the last 72 hours. BMP:   Recent Labs     05/29/19  0623 05/30/19  0657   K 3.4* 3.5*     BNP: No results for input(s): BNP in the last 72 hours. PT/INR: No results for input(s): PROTIME, INR in the last 72 hours. APTT: No results for input(s): APTT in the last 72 hours. CARDIAC ENZYMES: No results for input(s): CKMB, CKMBINDEX, TROPONINT in the last 72 hours. Invalid input(s): CKTOTAL;3  FASTING LIPID PANEL:No results found for: CHOL, HDL, TRIG  LIVER PROFILE: No results for input(s): AST, ALT, ALB, BILIDIR, BILITOT, ALKPHOS in the last 72 hours. I/O (24Hr): No intake or output data in the 24 hours ending 05/30/19 1734    Glu last 24 hour  Recent Labs     05/28/19  1552 05/29/19  1613 05/30/19  1052 05/30/19  1610   POCGLU 156* 152* 142* 137*       No results for input(s): CLARITYU, COLORU, PHUR, SPECGRAV, PROTEINU, RBCUA, BLOODU, BACTERIA, NITRU, WBCUA, LEUKOCYTESUR, YEAST, GLUCOSEU, BILIRUBINUR in the last 72 hours. Impression/Plan:    1. Mobility and ADL dysfunction secondary to right subcapitalfemur fracture with hemiarthroplasty-continue rehab efforts, team conf reviewed, discharge plan 6/4  2. Right subcapital femur fracture with hemiarthroplasty-incision clean  3. Rheumatoid arthritis- prednisone  4. Postop anemia-monitor mproving  5.  Mildly elevated glucose-monitor, possibly due to steroids, glucose as above  6. Hypertension-Cozaar and Lopressor,  7. Anxiety- Ativan-decrease frequency  8. Pain-Percocet/Lidoderm patch, decrease frequency of Percocet  9. Hypothyroid-Synthroid  10. Mild hypokalemia-supplement , Lasix and changed to when necessary per family request  11. Pulmonary-methotrexate long-DuoNeb, questionable continue needed DuoNeb-was on nebulizer as needed at home  12. GI/history hemorrhoids-Protonix, MiraLAX, Senokot S  13. DVT prophylaxis-Xarelto  14. Internal medicine for medical management          Levonne Lung. Lee Townsend MD       This note is created with the assistance of a speech recognition program.  While intending to generate a document that actually reflects the content of the visit, the document can still have some errors including those of syntax and sound a like substitutions which may escape proof reading.   In such instances, actual meaning can be extrapolated by contextual diversion

## 2019-05-30 NOTE — PROGRESS NOTES
[facility] [facility]    ACUTE REHABILITATION OCCUPATIONAL THERAPY  DAILY NOTE    Date: 19  Patient Name: Selvin Roper      Room: 4524/9679-30    MRN: [id number]   : 1935  (80 y.o.)  Gender: female   Referring Practitioner: Robert Guadarrama / Nash Martinez / Jose Walton   (rehab)       Dr. 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 : (R hip hemiarthroplasty 19; L MARYANN)  Hip Precautions: No hip flexion > 90 degrees, No hip extension, No hip external rotation, No ADduction  Other position/activity restrictions: Posterolateral approach - no hip flexion greater than 90 degrees, no hip adduction across midline, no hip hyperextension, and no external rotation. Right Lower Extremity Weight Bearing: Weight Bearing As Tolerated  Required Braces or Orthoses?: No    Subjective  Comments: Pt reliant on son for assist although therapist present for ADL session. Pt freq use for bathroom and increased talking impedes therapy sessions. Pain Level: 0  Restrictions/Precautions: Fall Risk;Weight Bearing        Pain Assessment  Pain Assessment: 0-10  Pain Level: 0    Objective  Cognition  Overall Cognitive Status: Impaired  Attention Span: Attends with cues to redirect  Memory: Decreased short term memory  Following Commands:  Follows one step commands with repetition  Safety Judgement: Decreased awareness of need for safety  Awareness of Errors: Decreased awareness of errors  Insights: Decreased awareness of deficits  Sequencing and Organization: Assistance required to implement solutions;Assistance required to generate solutions;Assistance required to identify errors made  Perception  Overall Perceptual Status: Impaired  Perseveration: Perseverates during conversation  Balance  Sitting Balance: Modified independent   Standing Balance: Modified independent   Bed mobility  Supine to Sit: Modified independent(increased time. )  Transfers  Sit to stand: Modified independent  Stand to sit: Modified independent  Standing Balance  Activity: ADL tasks, mobility in room/bathroom for item retreival  Functional Mobility  Functional - Mobility Device: Rolling Walker  Activity: Other;Retrieve items;Transport items; To/from bathroom  Assist Level: Supervision  Toilet Transfers  Toilet - Technique: Ambulating  Equipment Used: Raised toilet seat with rails  Toilet Transfer: Modified independent     Type of ROM/Therapeutic Exercise  Type of ROM/Therapeutic Exercise: Free weights(2#, x15 reps. )  Comment: pt participated in UB ex's to address overall strength and endurance for functional tasks. Exercises  Scapular Protraction: x  Scapular Retraction: x  Shoulder Flexion: x  Shoulder Extension: x  Elbow Flexion: x  Elbow Extension: x        OT FIM:   Eatin - Feeds self with setup/supervision/cues and/or requires only setup/supervision/cues to perform tube feedings(per report. )  Groomin - Requires setup/cues to do all tasks(set up A)  Bathin - Able to bathe all 10 areas with setup/sup/cues  Dressing-Upper: 5 - Requires setup/supervision/cues and/or requires assist with presthesis/brace only  Dressing-Lower: 5 - Requires setup/supervision/cues and/or staff applies TEDS/prosthesis/brace only  Toiletin - Requires device (grab bar/walker/etc.)  Toilet Transfer: 5 - Requires setup/supervision/cues  Primary Mode: Shower  Tub Transfer: 0 - Activity does not occur  Shower Transfer: 0 - Activity does not occur    Social Interaction: 5 - Patient is appropriate with supervision/cues  Problem Solvin - Patient able to solve simple/routine tasks  Memory: 4 - Patient remembers 75-90%+ of the time    Assessment  Performance deficits / Impairments: Decreased functional mobility ; Decreased ADL status; Decreased ROM; Decreased strength;Decreased safe awareness;Decreased cognition;Decreased endurance;Decreased balance;Decreased high-level IADLs;Decreased coordination  Prognosis: Good  Discharge Recommendations: Home with assist PRN;Home with Home health OT; Home with nursing aide  Activity Tolerance: Patient Tolerated treatment well  Safety Devices in place: Yes  Type of devices: Gait belt;Patient at risk for falls;Call light within reach;Nurse notified  Equipment Recommendations  Equipment Needed: Yes  Reacher: for LBD  Sock Aid: X  Other: Long Handled Sponge      Plan  Plan  Times per week: 900/7   Times per day: Twice a day  Current Treatment Recommendations: Self-Care / ADL, Balance Training, Functional Mobility Training, Endurance Training, Safety Education & Training, Patient/Caregiver Education & Training, Positioning, Strengthening, ROM, Cognitive Reorientation, Home Management Training  Plan Comment: continue OT  Patient Goals   Patient goals : To Return Home able to care for herself   Short term goals  Time Frame for Short term goals: 1 week   Short term goal 1: Pt will V/D Good understanding of AE/DME/modified techniques for increased IND with self-care and mobility. Short term goal 2: Pt will actively participate in 10+ minutes of self-care to the best of Pt's ability to promote increased IND with self-care. Short term goal 3: Pt will perform bed mobility with Min A to sit EOB for increased participation with self-care. Short term goal 4: Pt will perform sit to stand transfer with Min A with RW with Good safety awareness. Short term goal 5: Pt will actively participate in 10-15 minutes in therapeutic exercise/functional activities to promote increased IND with self-care and mobility.   Long term goals  Time Frame for Long term goals : By Discharge   Long term goal 1: D/V understanding of Joint protection strategies with application to care needs   Long term goal 2: Mod I for basic premorbid self care tasks    Long term goal 3: Tolerate standing for 10 minutes for self care and home management tasks         05/30/19 0947 05/30/19 1348   OT Individual Minutes   Time In [id number] 0243   Time Out [id number]   Minutes 44 31     Electronically signed by RAI Holland on 5/30/19 at 3:26 PM

## 2019-05-30 NOTE — PROGRESS NOTES
Physical Therapy  [address]  Acute Rehabilitation Physical Therapy Progress Note    Date: 19  Patient Name: Rocky Spear       Room: 8115/1274-62  MRN: [id number]   Account: [de-identified]   : 1935  (80 y.o.) Gender: female     Referring Practitioner: Robert Vivas / Yamil Almaraz / June Allen   (rehab)       Dr. Luci Chavarria (ortho)   Diagnosis: S/P R hip hemiarthroplasty 19  Past Medical History:  has a past medical history of Allergic rhinitis, Anemia, Anxiety, Asthma, Cataract, COPD (chronic obstructive pulmonary disease) ([address] CTS (carpal tunnel syndrome), Esophageal reflux, Headache(784.0), Hyperlipidemia, Hypertension, Hypothyroidism, Osteoarthritis, Osteoporosis, and Rheumatoid arthritis(714.0). Past Surgical History:   has a past surgical history that includes Rotator cuff repair (Left); Tonsillectomy and adenoidectomy; Hysterectomy, vaginal; Foot surgery; Hemorrhoid surgery; Dilation and curettage of uterus; Cataract removal with implant (Bilateral); Inguinal hernia repair; Cystocele repair; joint replacement (Left); other surgical history; Hammer toe surgery; other surgical history; and HEMIARTHROPLASTY HIP (Right, 2019). Additional Pertinent Hx: RA, advanced age, R hemiarthroplasty 19     Restrictions/Precautions  Restrictions/Precautions: Fall Risk;Weight Bearing  Required Braces or Orthoses?: No  Implants present? : (R hip hemiarthroplasty 19; L MARYANN)  Lower Extremity Weight Bearing Restrictions  Right Lower Extremity Weight Bearing: Weight Bearing As Tolerated  Position Activity Restriction  Hip Precautions: No hip flexion > 90 degrees; No hip extension; No hip external rotation; No ADduction  Other position/activity restrictions: Posterolateral approach - no hip flexion greater than 90 degrees, no hip adduction across midline, no hip hyperextension, and no external rotation.     Subjective: Pt voicing frustration with administration of Lasix as it makes her have to urinate more, exacerabating toileting issues and interfering with therapy. Comments: Pt charley's questionable pericare techniques, using folded barrier cream wipes as incontinence pad (also reusing wadded toilet paper for the same purpose) inside disposable brief, which pt states are her \"regular\" underwear. Vital Signs  Patient Currently in Pain: Yes  Pain Assessment: Faces  Pain Level: 8  Pain Location: Hip  Pain Orientation: Right  Pain Descriptors: Cramping;Discomfort  Pain Frequency: Intermittent(increases during supine ex)  Non-Pharmaceutical Pain Intervention(s): Ambulation/Increased Activity; Elevation;Repositioned; Rest  Response to Pain Intervention: Patient Satisfied     Bed Mobility  Supine to Sit: Contact guard assistance(R LE)  Sit to Supine: Contact guard assistance(R LE)  Scooting: Supervision  Comment: Mat, wedge, 3 pillows    Transfers:  Sit to Stand: Stand by assistance(Cues for hand placement with good return)  Stand to sit: Stand by assistance(Cues for hand placement with good return)  Stand Pivot Transfers: Stand by assistance   Comment: RW     WB Status: WBAT R  Ambulation 1  Surface: level tile  Device: Rolling Walker  Assistance: Stand by assistance  Quality of Gait: slow, steady pace; hip and knee flexion; step-through pattern; pt verbalizes \"heel, toe, push\" while ambulating with little correlation to her movement. Gait Deviations: Slow Diane;Decreased step length  Distance: 50' + 150' a.m., 200' p.m. Comments: Pt charley's improved attention to posture today. 200' p.m. walk = 6.5 min. Stairs/Curb  Stairs?: Yes  Stairs  # Steps : 20  Stairs Height: (4\"/6\")  Rails: Bilateral  Device: No Device  Assistance: Stand by assistance  Comment: Pt adopts proper sequencing initially, but falls out of sequence after turns, req's cues to return.       FIMS:  TRANSFERS  Bed, Chair, Wheel Chair: 4 - Requires steadying assistance only <25% assist  and/or requires assist with one leg only FOLLOW UP: Yes  Discharge Recommendations: Home with assist PRN;Home with Home health PT    Goals  Short term goals  Time Frame for Short term goals: by 1 week  Short term goal 1: supervision bed mobility  Short term goal 2: transfers Min A from multiple surfaces  Short term goal 3: Ambulate 30 ft with RW in < 3 min   Short term goal 4: Amb up/down 5 stairs el HR mod A x 1  Short term goal 5: min A WC mobility 25 ft using BLE.   Long term goals  Time Frame for Long term goals : by discharge  Long term goal 1: Bed mobility Mod I  Long term goal 2: Transfers Mod I multiple surfaces  Long term goal 3: Ambulate 50-60 ft MI with RW  Long term goal 4: Amb up/down 5 steps el HR min A x 1  Long term goal 5: pt will be indep with wc mobility 50 ft     05/30/19 1049 05/30/19 1430 05/30/19 1534   PT Individual Minutes   Time In 1056 1446  --    Time Out 1125 1500  --    Minutes 29 14  --    PT Concurrent Minutes   Time In 1040 1430 1500   Time Out 6020 [id number]   Minutes 15 15 22     Electronically signed by Louisa Darling PTA on 5/30/19 at 5:41 PM

## 2019-05-31 LAB
ANION GAP SERPL CALCULATED.3IONS-SCNC: 9 MMOL/L (ref 9–17)
BUN BLDV-MCNC: 15 MG/DL (ref 8–23)
BUN/CREAT BLD: ABNORMAL (ref 9–20)
CALCIUM SERPL-MCNC: 8.2 MG/DL (ref 8.6–10.4)
CHLORIDE BLD-SCNC: 102 MMOL/L (ref 98–107)
CO2: 26 MMOL/L (ref 20–31)
CREAT SERPL-MCNC: <0.4 MG/DL (ref 0.5–0.9)
GFR AFRICAN AMERICAN: ABNORMAL ML/MIN
GFR NON-AFRICAN AMERICAN: ABNORMAL ML/MIN
GFR SERPL CREATININE-BSD FRML MDRD: ABNORMAL ML/MIN/{1.73_M2}
GFR SERPL CREATININE-BSD FRML MDRD: ABNORMAL ML/MIN/{1.73_M2}
GLUCOSE BLD-MCNC: 104 MG/DL (ref 70–99)
GLUCOSE BLD-MCNC: 133 MG/DL (ref 65–105)
GLUCOSE BLD-MCNC: 135 MG/DL (ref 65–105)
HCT VFR BLD CALC: 28.5 % (ref 36–46)
HEMOGLOBIN: 9.2 G/DL (ref 12–16)
MCH RBC QN AUTO: 27.2 PG (ref 26–34)
MCHC RBC AUTO-ENTMCNC: 32.4 G/DL (ref 31–37)
MCV RBC AUTO: 84.1 FL (ref 80–100)
NRBC AUTOMATED: ABNORMAL PER 100 WBC
PDW BLD-RTO: 17.6 % (ref 11.5–14.9)
PLATELET # BLD: 297 K/UL (ref 150–450)
PMV BLD AUTO: 7.5 FL (ref 6–12)
POTASSIUM SERPL-SCNC: 3.7 MMOL/L (ref 3.7–5.3)
RBC # BLD: 3.39 M/UL (ref 4–5.2)
SODIUM BLD-SCNC: 137 MMOL/L (ref 135–144)
WBC # BLD: 6.6 K/UL (ref 3.5–11)

## 2019-05-31 PROCEDURE — 85027 COMPLETE CBC AUTOMATED: CPT

## 2019-05-31 PROCEDURE — 94640 AIRWAY INHALATION TREATMENT: CPT

## 2019-05-31 PROCEDURE — 97110 THERAPEUTIC EXERCISES: CPT

## 2019-05-31 PROCEDURE — 6370000000 HC RX 637 (ALT 250 FOR IP): Performed by: ORTHOPAEDIC SURGERY

## 2019-05-31 PROCEDURE — 97530 THERAPEUTIC ACTIVITIES: CPT

## 2019-05-31 PROCEDURE — 6370000000 HC RX 637 (ALT 250 FOR IP): Performed by: PHYSICAL MEDICINE & REHABILITATION

## 2019-05-31 PROCEDURE — 80048 BASIC METABOLIC PNL TOTAL CA: CPT

## 2019-05-31 PROCEDURE — 99231 SBSQ HOSP IP/OBS SF/LOW 25: CPT | Performed by: INTERNAL MEDICINE

## 2019-05-31 PROCEDURE — 94761 N-INVAS EAR/PLS OXIMETRY MLT: CPT

## 2019-05-31 PROCEDURE — 97535 SELF CARE MNGMENT TRAINING: CPT

## 2019-05-31 PROCEDURE — 82947 ASSAY GLUCOSE BLOOD QUANT: CPT

## 2019-05-31 PROCEDURE — 97116 GAIT TRAINING THERAPY: CPT

## 2019-05-31 PROCEDURE — 99232 SBSQ HOSP IP/OBS MODERATE 35: CPT | Performed by: PHYSICAL MEDICINE & REHABILITATION

## 2019-05-31 PROCEDURE — 1180000000 HC REHAB R&B

## 2019-05-31 PROCEDURE — 36415 COLL VENOUS BLD VENIPUNCTURE: CPT

## 2019-05-31 PROCEDURE — 6370000000 HC RX 637 (ALT 250 FOR IP): Performed by: NURSE PRACTITIONER

## 2019-05-31 RX ADMIN — LORAZEPAM 0.5 MG: 0.5 TABLET ORAL at 18:36

## 2019-05-31 RX ADMIN — ALUMINUM HYDROXIDE, MAGNESIUM HYDROXIDE, AND SIMETHICONE 30 ML: 200; 200; 20 SUSPENSION ORAL at 06:38

## 2019-05-31 RX ADMIN — IPRATROPIUM BROMIDE AND ALBUTEROL SULFATE 1 AMPULE: .5; 3 SOLUTION RESPIRATORY (INHALATION) at 15:47

## 2019-05-31 RX ADMIN — OXYCODONE HYDROCHLORIDE AND ACETAMINOPHEN 1 TABLET: 5; 325 TABLET ORAL at 12:17

## 2019-05-31 RX ADMIN — LOSARTAN POTASSIUM 25 MG: 25 TABLET ORAL at 07:52

## 2019-05-31 RX ADMIN — METOPROLOL TARTRATE 50 MG: 50 TABLET ORAL at 21:37

## 2019-05-31 RX ADMIN — RIVAROXABAN 10 MG: 10 TABLET, FILM COATED ORAL at 18:36

## 2019-05-31 RX ADMIN — IPRATROPIUM BROMIDE AND ALBUTEROL SULFATE 1 AMPULE: .5; 3 SOLUTION RESPIRATORY (INHALATION) at 12:07

## 2019-05-31 RX ADMIN — OXYCODONE HYDROCHLORIDE AND ACETAMINOPHEN 1 TABLET: 5; 325 TABLET ORAL at 18:36

## 2019-05-31 RX ADMIN — LORAZEPAM 0.5 MG: 0.5 TABLET ORAL at 00:07

## 2019-05-31 RX ADMIN — IPRATROPIUM BROMIDE AND ALBUTEROL SULFATE 1 AMPULE: .5; 3 SOLUTION RESPIRATORY (INHALATION) at 21:20

## 2019-05-31 RX ADMIN — OXYCODONE HYDROCHLORIDE AND ACETAMINOPHEN 1 TABLET: 5; 325 TABLET ORAL at 06:15

## 2019-05-31 RX ADMIN — PREDNISONE 10 MG: 10 TABLET ORAL at 07:52

## 2019-05-31 RX ADMIN — METOPROLOL TARTRATE 50 MG: 50 TABLET ORAL at 07:51

## 2019-05-31 RX ADMIN — IPRATROPIUM BROMIDE AND ALBUTEROL SULFATE 1 AMPULE: .5; 3 SOLUTION RESPIRATORY (INHALATION) at 08:08

## 2019-05-31 RX ADMIN — OXYCODONE HYDROCHLORIDE AND ACETAMINOPHEN 1 TABLET: 5; 325 TABLET ORAL at 00:07

## 2019-05-31 RX ADMIN — SENNOSIDES,DOCUSATE SODIUM 1 TABLET: 8.6; 5 TABLET, FILM COATED ORAL at 07:51

## 2019-05-31 RX ADMIN — LORAZEPAM 0.5 MG: 0.5 TABLET ORAL at 12:03

## 2019-05-31 RX ADMIN — LEVOTHYROXINE SODIUM 50 MCG: 50 TABLET ORAL at 06:15

## 2019-05-31 RX ADMIN — PANTOPRAZOLE SODIUM 40 MG: 40 TABLET, DELAYED RELEASE ORAL at 06:14

## 2019-05-31 RX ADMIN — SENNOSIDES,DOCUSATE SODIUM 1 TABLET: 8.6; 5 TABLET, FILM COATED ORAL at 21:37

## 2019-05-31 ASSESSMENT — PAIN SCALES - GENERAL
PAINLEVEL_OUTOF10: 6
PAINLEVEL_OUTOF10: 6
PAINLEVEL_OUTOF10: 5
PAINLEVEL_OUTOF10: 7
PAINLEVEL_OUTOF10: 7
PAINLEVEL_OUTOF10: 6

## 2019-05-31 ASSESSMENT — PAIN SCALES - WONG BAKER: WONGBAKER_NUMERICALRESPONSE: 2

## 2019-05-31 ASSESSMENT — PAIN DESCRIPTION - LOCATION
LOCATION: HIP;GROIN;KNEE
LOCATION: GROIN

## 2019-05-31 ASSESSMENT — PAIN DESCRIPTION - ORIENTATION
ORIENTATION: RIGHT
ORIENTATION: RIGHT

## 2019-05-31 ASSESSMENT — PAIN DESCRIPTION - DESCRIPTORS: DESCRIPTORS: ACHING

## 2019-05-31 ASSESSMENT — PAIN DESCRIPTION - PAIN TYPE: TYPE: ACUTE PAIN;CHRONIC PAIN

## 2019-05-31 NOTE — PROGRESS NOTES
Physical Medicine & Rehabilitation  Progress Note    5/31/2019 3:09 PM     CC: Ambulatory and ADL dysfunction due to right hip fracture    Subjective:   Feels well. No complaints. Pain controlled. Bowels and bladder okay. Should happy with when necessary Lasix. ROS:  Denies fevers, chills, sweats. No chest pain, palpitations, lightheadedness. Denies coughing, wheezing or shortness of breath. Denies abdominal pain, nausea, diarrhea or constipation. No new areas of joint pain. Denies new areas of numbness or weakness. Denies new anxiety or depression issues. No new skin problems. Rehabilitation:       PT:  Restrictions/Precautions: Fall Risk, Weight Bearing  Implants present? : (R hip hemiarthroplasty 5/17/19; L MARYANN)  Hip Precautions: No hip flexion > 90 degrees, No hip extension, No hip external rotation, No ADduction  Other position/activity restrictions: Posterolateral approach - no hip flexion greater than 90 degrees, no hip adduction across midline, no hip hyperextension, and no external rotation. Right Lower Extremity Weight Bearing: Weight Bearing As Tolerated   Transfers  Sit to Stand: Stand by assistance(Cues for hand placement with good return)  Stand to sit: Stand by assistance(Cues for hand placement with good return)  Bed to Chair: Stand by assistance  Comment: RW  WB Status: WBAT R  Ambulation 1  Surface: level tile  Device: Rolling Walker  Assistance: Stand by assistance  Quality of Gait: slow, steady pace; hip and knee flexion; step-through pattern; pt verbalizes \"heel, toe, push\" while ambulating with little correlation to her movement. Gait Deviations: Slow Diane, Decreased step length  Distance: 50' + 150' a.m., 200' p.m. Comments: Pt demo's improved attention to posture today. 200' p.m. walk = 6.5 min.     Transfers  Sit to Stand: Stand by assistance(Cues for hand placement with good return)  Stand to sit: Stand by assistance(Cues for hand placement with good return)  Bed to subcapital femur fracture with hemiarthroplasty-incision clean  3. Rheumatoid arthritis- prednisone  4. Postop anemia-hemoglobin 9.2, recheck H&H tomorrow  5. Mildly elevated glucose-monitor, possibly due to steroids, glucose as above  6. Hypertension-Cozaar and Lopressor,  7. Anxiety- Ativan -continues attempt to taper  8. Pain-Percocet/Lidoderm patch, decreased frequency of Percocet  9. Hypothyroid-Synthroid  10. Mild hypokalemia-supplement , Lasix and changed to when necessary per family request  11. Pulmonary-methotrexate long-DuoNeb, -was on nebulizer as needed at home per patient  12. GI/history hemorrhoids-Protonix, MiraLAX, Senokot S  13. DVT prophylaxis-Xarelto  14. Internal medicine for medical management          Wilfrid Henderson. Dot Nolasco MD       This note is created with the assistance of a speech recognition program.  While intending to generate a document that actually reflects the content of the visit, the document can still have some errors including those of syntax and sound a like substitutions which may escape proof reading.   In such instances, actual meaning can be extrapolated by contextual diversion

## 2019-05-31 NOTE — PLAN OF CARE
Problem: Pain:  Goal: Pain level will decrease  Description  Pain level will decrease  5/31/2019 0828 by Ira Espinosa RN  Outcome: Ongoing  5/31/2019 0607 by Sanya Gay RN  Outcome: Ongoing  Note:   Need for PRN pain medications continues, patient states medication manages discomfort  5/30/2019 1831 by Ira Espinosa RN  Outcome: Ongoing  Goal: Control of acute pain  Description  Control of acute pain  5/31/2019 0828 by Ira Espinosa RN  Outcome: Ongoing  5/31/2019 0607 by Sanya Gay RN  Outcome: Ongoing  5/30/2019 1831 by Ira Espinosa RN  Outcome: Ongoing  Goal: Control of chronic pain  Description  Control of chronic pain  5/31/2019 0828 by Ira Espinosa RN  Outcome: Ongoing  5/31/2019 0607 by Sanya Gay RN  Outcome: Ongoing  5/30/2019 1831 by Ira Espinosa RN  Outcome: Ongoing     Problem: Risk for Impaired Skin Integrity  Goal: Tissue integrity - skin and mucous membranes  Description  Structural intactness and normal physiological function of skin and  mucous membranes. 5/31/2019 0828 by Ira Espinosa RN  Outcome: Ongoing  5/31/2019 0607 by Sanya Gay RN  Outcome: Ongoing  Note:   PT able to reposition self but assistance is provided PRN  5/30/2019 1831 by Ira Espinosa RN  Outcome: Ongoing     Problem: Falls - Risk of:  Goal: Will remain free from falls  Description  Will remain free from falls  5/31/2019 0828 by Ira Espinosa RN  Outcome: Ongoing  5/31/2019 0607 by Sanya Gay RN  Outcome: Ongoing  Note:   1. Pt remains free from accidental injuries. 2. Fall precautions in place. 3. Bed in low position, wheels locked, and call light within reach. 4. ID band in place and checked frequently.        5/30/2019 1831 by Ira Espinosa RN  Outcome: Ongoing  Goal: Absence of physical injury  Description  Absence of physical injury  5/31/2019 0828 by Ira Espinosa RN  Outcome: Ongoing  5/31/2019 0607 by Sanya Gay RN  Outcome: Ongoing  5/30/2019 1831 by Ira Espinosa

## 2019-05-31 NOTE — PROGRESS NOTES
[place] [address] Internal Medicine    Progress Note    5/31/2019    2:10 PM    Name:   Harriett Ward  MRN:     [id number]     [place]:      [de-identified]   Room:   [address] [place] [address] Day:  10  Admit Date:  5/21/2019  6:04 PM    PCP:   Fatou Carlisle MD  Code Status:  Full Code    Subjective:     C/C: No chief complaint on file. Interval History Status: improved. HPI:   Patient has history of rheumatoid arthritis, chronically on steroids, osteoporosis. Patient had right hip fracture, after she sustained a fall.  status post surgery, doing better  5/29/19  Patient is bilateral leg edema  We are going to resume Lasix 20 mg daily which he was taking before  5/30/19  Reduce lasix to prn . Leg edma improved     5/31/19    · Lasix is on as needed basis  · Patient feeling better   · She knows she will ask for Lasix as needed   · Her participation in rehab activities has improved  1. Review of Systems:     Constitutional:  negative for chills, fevers, sweats  Respiratory:  negative for cough, dyspnea on exertion, hemoptysis, shortness of breath, wheezing  Cardiovascular:  negative for chest pain, chest pressure/discomfort, lower extremity edema, palpitations  Gastrointestinal:  negative for abdominal pain, constipation, diarrhea, nausea, vomiting  Neurological:  negative for dizziness, headache  Extremity - Pain in Right Hip area   Medications: Allergies:     Allergies   Allergen Reactions    Methotrexate Derivatives Other (See Comments)     unknown    No Known Allergies     Seasonal Other (See Comments)     Congestion       Current Meds:   Scheduled Meds:    vitamin D  50,000 Units Oral Weekly    sennosides-docusate sodium  1 tablet Oral BID    lidocaine  1 patch Transdermal Daily    rivaroxaban  10 mg Oral Daily    ipratropium-albuterol  1 ampule Inhalation Q4H WA    levothyroxine  50 mcg Oral Daily    lidocaine  1 patch Transdermal Daily    losartan  25 mg Oral

## 2019-05-31 NOTE — PROGRESS NOTES
[address]    ACUTE REHABILITATION OCCUPATIONAL THERAPY  DAILY NOTE    Date: 19  Patient Name: Santiago Wesley      Room: 1420/7126-88    MRN: [id number]   : 1935  (80 y.o.)  Gender: female   Referring Practitioner: Robert Rios / [employer] / Edita Ortega   (rehab)       Dr. 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 : (R hip hemiarthroplasty 19; L MARYANN)  Hip Precautions: No hip flexion > 90 degrees, No hip extension, No hip external rotation, No ADduction  Other position/activity restrictions: Posterolateral approach - no hip flexion greater than 90 degrees, no hip adduction across midline, no hip hyperextension, and no external rotation. Right Lower Extremity Weight Bearing: Weight Bearing As Tolerated  Required Braces or Orthoses?: No    Subjective  Subjective: \" I don't really have any pain this afternoon\"   Comments: pt requries cues to attend to taks   Patient Currently in Pain: Denies  Restrictions/Precautions: Fall Risk;Weight Bearing             Objective  Cognition  Overall Cognitive Status: Impaired  Attention Span: Attends with cues to redirect  Memory: Decreased short term memory  Following Commands:  Follows one step commands with repetition  Safety Judgement: Decreased awareness of need for safety  Awareness of Errors: Decreased awareness of errors  Insights: Decreased awareness of deficits  Sequencing and Organization: Assistance required to implement solutions;Assistance required to generate solutions;Assistance required to identify errors made  Balance  Sitting Balance: Independent  Standing Balance: Modified independent   Bed mobility  Comment: pt seen in chair this date   Standing Balance  Time: 7 min, 6 min 3 min   Activity: standing fucntional / hand strenght and reahing activity, standing for balloon toss, no LOB   Functional Mobility  Functional - Mobility Device: Rolling Walker  Activity: To/from bathroom  Assist Level: Supervision  Toilet Transfers  Toilet - Technique: Ambulating  Equipment Used: Raised toilet seat with rails  Toilet Transfer: Modified independent  Toilet Transfers Comments: pt remained on toilet at end of session stating hse would need exteded time, RN notified and pt given call light and instructed to pull when complete      ADL  Additional Comments: see AM FIM scores           OT FIM:     see AM not for FIM           Assessment  Performance deficits / Impairments: Decreased functional mobility ; Decreased ADL status; Decreased ROM; Decreased strength;Decreased safe awareness;Decreased cognition;Decreased endurance;Decreased balance;Decreased high-level IADLs;Decreased coordination  Assessment: pt requried cues for attention to task   Prognosis: Good  Activity Tolerance: Patient Tolerated treatment well  Safety Devices in place: Yes  Type of devices: Call light within reach;Nurse notified(left on toilet )          Patient Education:  Patient Goals   Patient goals : To Return Home able to care for herself   Patient Education: safety during toileting   Learner:patient  Method: demonstration and explanation       Outcome: acknowledged understanding    Plan  Plan  Times per week: 900/7   Times per day: Twice a day  Current Treatment Recommendations: Self-Care / ADL, Balance Training, Functional Mobility Training, Endurance Training, Safety Education & Training, Patient/Caregiver Education & Training, Positioning, Strengthening, ROM, Cognitive Reorientation, Home Management Training  Plan Comment: continue OT  Patient Goals   Patient goals :  To Return Home able to care for herself   Short term goals  Time Frame for Short term goals: 1 week   Short term goal 1: Pt will V/D Good understanding of AE/DME/modified techniques for increased IND with self-care and mobility. Short term goal 2: Pt will actively participate in 10+ minutes of self-care to the best of Pt's ability to promote increased IND with self-care. Short term goal 3: Pt will perform bed mobility with Min A to sit EOB for increased participation with self-care. Short term goal 4: Pt will perform sit to stand transfer with Min A with RW with Good safety awareness. Short term goal 5: Pt will actively participate in 10-15 minutes in therapeutic exercise/functional activities to promote increased IND with self-care and mobility.   Long term goals  Time Frame for Long term goals : By Discharge   Long term goal 1: D/V understanding of Joint protection strategies with application to care needs   Long term goal 2: Mod I for basic premorbid self care tasks    Long term goal 3: Tolerate standing for 10 minutes for self care and home management tasks        05/31/19 1604   OT Individual Minutes   Time In [facility] 24   Time Out 1453   Minutes 36       Electronically signed by DENISSE Perez on 5/31/19 at 4:04 PM

## 2019-05-31 NOTE — PROGRESS NOTES
[address]    ACUTE REHABILITATION OCCUPATIONAL THERAPY  DAILY NOTE    Date: 19  Patient Name: Camryn Service      Room: 9689/1993-    MRN: [id number]   : 1935  (80 y.o.)  Gender: female   Referring Practitioner: Robert Ricks / Na Wilkerson / Leobardo Gill   (rehab)       Dr. 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 : (R hip hemiarthroplasty 19; L MARYANN)  Hip Precautions: No hip flexion > 90 degrees, No hip extension, No hip external rotation, No ADduction  Other position/activity restrictions: Posterolateral approach - no hip flexion greater than 90 degrees, no hip adduction across midline, no hip hyperextension, and no external rotation. Right Lower Extremity Weight Bearing: Weight Bearing As Tolerated  Required Braces or Orthoses?: No    Subjective  Comments: Pt spends a good portion of session on toilet, pt cuts other areas of ADL d/t motivation to complete UB ex's.    Patient Currently in Pain: Yes  Pain Level: 6  Pain Location: Hip;Groin;Knee  Pain Orientation: Right  Restrictions/Precautions: Fall Risk;Weight Bearing        Pain Assessment  Pain Assessment: 0-10  Pain Level: 6  Pain Type: Acute pain, Chronic pain  Pain Location: Hip, Groin, Knee  Pain Orientation: Right  Pain Descriptors: Aching    Objective  Cognition  Overall Cognitive Status: Impaired  Attention Span: Attends with cues to redirect  Memory: Decreased short term memory  Safety Judgement: Decreased awareness of need for safety  Perception  Overall Perceptual Status: WFL  Balance  Sitting Balance: Independent  Standing Balance: Modified independent   Bed mobility  Supine to Sit: Modified independent  Transfers  Sit to stand: Modified independent  Stand to sit: Modified independent  Transfer Comments: fair safety      Functional Education:  Patient Goals   Patient goals : To Return Home able to care for herself   Patient Education: reinforced goals and therapy schedule, RW safety   Learner:patient  Method: explanation       Outcome: needs reinforcement     Plan  Plan  Times per week: 900/7   Times per day: Twice a day  Current Treatment Recommendations: Self-Care / ADL, Balance Training, Functional Mobility Training, Endurance Training, Safety Education & Training, Patient/Caregiver Education & Training, Positioning, Strengthening, ROM, Cognitive Reorientation, Home Management Training  Plan Comment: continue OT  Patient Goals   Patient goals : To Return Home able to care for herself   Short term goals  Time Frame for Short term goals: 1 week   Short term goal 1: Pt will V/D Good understanding of AE/DME/modified techniques for increased IND with self-care and mobility. Short term goal 2: Pt will actively participate in 10+ minutes of self-care to the best of Pt's ability to promote increased IND with self-care. Short term goal 3: Pt will perform bed mobility with Min A to sit EOB for increased participation with self-care. Short term goal 4: Pt will perform sit to stand transfer with Min A with RW with Good safety awareness. Short term goal 5: Pt will actively participate in 10-15 minutes in therapeutic exercise/functional activities to promote increased IND with self-care and mobility.   Long term goals  Time Frame for Long term goals : By Discharge   Long term goal 1: D/V understanding of Joint protection strategies with application to care needs   Long term goal 2: Mod I for basic premorbid self care tasks    Long term goal 3: Tolerate standing for 10 minutes for self care and home management tasks         19 0939   OT Individual Minutes   Time In 0840   Time Out 0928   Minutes 48     Electronically signed by RAI Dolan on 19 at 9:41 AM

## 2019-05-31 NOTE — PROGRESS NOTES
Physical Therapy  [address]  Acute Rehabilitation Physical Therapy Progress Note    Date: 19  Patient Name: Rodriguez Adams       Room: 7954/0452-95  MRN: [id number]   Account: [de-identified]   : 1935  (80 y.o.) Gender: female     Referring Practitioner: Robert Phipps / Cha Marroquin / Denae Tiwari   (rehab)       Dr. Catrina Junior (ortho)   Diagnosis: S/P R hip hemiarthroplasty 19  Past Medical History:  has a past medical history of Allergic rhinitis, Anemia, Anxiety, Asthma, Cataract, COPD (chronic obstructive pulmonary disease) ([address] CTS (carpal tunnel syndrome), Esophageal reflux, Headache(784.0), Hyperlipidemia, Hypertension, Hypothyroidism, Osteoarthritis, Osteoporosis, and Rheumatoid arthritis(714.0). Past Surgical History:   has a past surgical history that includes Rotator cuff repair (Left); Tonsillectomy and adenoidectomy; Hysterectomy, vaginal; Foot surgery; Hemorrhoid surgery; Dilation and curettage of uterus; Cataract removal with implant (Bilateral); Inguinal hernia repair; Cystocele repair; joint replacement (Left); other surgical history; Hammer toe surgery; other surgical history; and HEMIARTHROPLASTY HIP (Right, 2019). Additional Pertinent Hx: RA, advanced age, R hemiarthroplasty 19     Restrictions/Precautions  Restrictions/Precautions: Fall Risk;Weight Bearing  Required Braces or Orthoses?: No  Implants present? : (R hip hemiarthroplasty 19; L MARYANN)  Lower Extremity Weight Bearing Restrictions  Right Lower Extremity Weight Bearing: Weight Bearing As Tolerated  Position Activity Restriction  Hip Precautions: No hip flexion > 90 degrees; No hip extension; No hip external rotation; No ADduction  Other position/activity restrictions: Posterolateral approach - no hip flexion greater than 90 degrees, no hip adduction across midline, no hip hyperextension, and no external rotation.     Subjective: Pt states doctor agreed to reduce/eliminate Lasix to reduce trips to support)  Sitting - Dynamic: Fair;+(EOC, no back support)  Standing - Static: Fair;+(RW)  Standing - Dynamic: Fair(RW)    EXERCISES    Other exercises?: Yes  Other exercises 2: Seated BLE EX: 1# L LE/lime (min) resistance band, 15x each  Other exercises 5: UBE, 5 min. each F/R(Pillow strapped between LEs to emphasize neutral hip pos.)  Other exercises 9: Cable column, pull-downs & rows, 5#, 2x10     Activity Tolerance: Patient Tolerated treatment well(Rest breaks PRN)  PT Equipment Recommendations  Equipment Needed: No     Current Treatment Recommendations: Strengthening, ROM, Balance Training, Transfer Training, Gait Training, Stair training, Neuromuscular Re-education, Functional Mobility Training, Home Exercise Program, Safety Education & Training, Patient/Caregiver Education & Training, Wheelchair Mobility Training, Equipment Evaluation, Education, & procurement    Conditions Requiring Skilled Therapeutic Intervention  Body structures, Functions, Activity limitations: Decreased functional mobility ; Decreased ADL status; Decreased ROM; Decreased strength  Assessment: Pt req's less time for amb; slow moving and talkative throughout. REQUIRES PT FOLLOW UP: Yes  Discharge Recommendations: Home with assist PRN;Home with Home health PT    Goals  Short term goals  Time Frame for Short term goals: by 1 week  Short term goal 1: supervision bed mobility  Short term goal 2: transfers Min A from multiple surfaces  Short term goal 3: Ambulate 30 ft with RW in < 3 min   Short term goal 4: Amb up/down 5 stairs el HR mod A x 1  Short term goal 5: min A WC mobility 25 ft using BLE.   Long term goals  Time Frame for Long term goals : by discharge  Long term goal 1: Bed mobility Mod I  Long term goal 2: Transfers Mod I multiple surfaces  Long term goal 3: Ambulate 50-60 ft MI with RW  Long term goal 4: Amb up/down 5 steps el HR min A x 1  Long term goal 5: pt will be indep with wc mobility 50 ft     05/31/19 1044 05/31/19 9033 PT Individual Minutes   Time In 4305 0061   Time Out 5371 0465   CMZSPZE 35 47   Minute Variance   Variance  --  2   Reason  --    (late to OT tx; DENISSE Ibarra agreed to make up time.)     Electronically signed by Lisbet Brewer PTA on 5/31/19 at 5:25 PM

## 2019-06-01 LAB
GLUCOSE BLD-MCNC: 100 MG/DL (ref 65–105)
GLUCOSE BLD-MCNC: 108 MG/DL (ref 65–105)
GLUCOSE BLD-MCNC: 145 MG/DL (ref 65–105)
GLUCOSE BLD-MCNC: 176 MG/DL (ref 65–105)
HCT VFR BLD CALC: 29.1 % (ref 36–46)
HEMOGLOBIN: 9.4 G/DL (ref 12–16)

## 2019-06-01 PROCEDURE — 99231 SBSQ HOSP IP/OBS SF/LOW 25: CPT | Performed by: INTERNAL MEDICINE

## 2019-06-01 PROCEDURE — 85014 HEMATOCRIT: CPT

## 2019-06-01 PROCEDURE — 99232 SBSQ HOSP IP/OBS MODERATE 35: CPT | Performed by: PHYSICAL MEDICINE & REHABILITATION

## 2019-06-01 PROCEDURE — 6370000000 HC RX 637 (ALT 250 FOR IP): Performed by: ORTHOPAEDIC SURGERY

## 2019-06-01 PROCEDURE — 94640 AIRWAY INHALATION TREATMENT: CPT

## 2019-06-01 PROCEDURE — 6370000000 HC RX 637 (ALT 250 FOR IP): Performed by: PHYSICAL MEDICINE & REHABILITATION

## 2019-06-01 PROCEDURE — 97535 SELF CARE MNGMENT TRAINING: CPT

## 2019-06-01 PROCEDURE — 82947 ASSAY GLUCOSE BLOOD QUANT: CPT

## 2019-06-01 PROCEDURE — 97116 GAIT TRAINING THERAPY: CPT

## 2019-06-01 PROCEDURE — 1180000000 HC REHAB R&B

## 2019-06-01 PROCEDURE — 94761 N-INVAS EAR/PLS OXIMETRY MLT: CPT

## 2019-06-01 PROCEDURE — 97530 THERAPEUTIC ACTIVITIES: CPT

## 2019-06-01 PROCEDURE — 36415 COLL VENOUS BLD VENIPUNCTURE: CPT

## 2019-06-01 PROCEDURE — 97110 THERAPEUTIC EXERCISES: CPT

## 2019-06-01 PROCEDURE — 94760 N-INVAS EAR/PLS OXIMETRY 1: CPT

## 2019-06-01 PROCEDURE — 85018 HEMOGLOBIN: CPT

## 2019-06-01 RX ADMIN — PANTOPRAZOLE SODIUM 40 MG: 40 TABLET, DELAYED RELEASE ORAL at 06:40

## 2019-06-01 RX ADMIN — IPRATROPIUM BROMIDE AND ALBUTEROL SULFATE 1 AMPULE: .5; 3 SOLUTION RESPIRATORY (INHALATION) at 11:32

## 2019-06-01 RX ADMIN — RIVAROXABAN 10 MG: 10 TABLET, FILM COATED ORAL at 17:50

## 2019-06-01 RX ADMIN — SENNOSIDES,DOCUSATE SODIUM 1 TABLET: 8.6; 5 TABLET, FILM COATED ORAL at 09:15

## 2019-06-01 RX ADMIN — LORAZEPAM 0.5 MG: 0.5 TABLET ORAL at 20:04

## 2019-06-01 RX ADMIN — OXYCODONE HYDROCHLORIDE AND ACETAMINOPHEN 1 TABLET: 5; 325 TABLET ORAL at 21:57

## 2019-06-01 RX ADMIN — LEVOTHYROXINE SODIUM 50 MCG: 50 TABLET ORAL at 06:40

## 2019-06-01 RX ADMIN — OXYCODONE HYDROCHLORIDE AND ACETAMINOPHEN 1 TABLET: 5; 325 TABLET ORAL at 00:46

## 2019-06-01 RX ADMIN — IPRATROPIUM BROMIDE AND ALBUTEROL SULFATE 1 AMPULE: .5; 3 SOLUTION RESPIRATORY (INHALATION) at 15:55

## 2019-06-01 RX ADMIN — OXYCODONE HYDROCHLORIDE AND ACETAMINOPHEN 1 TABLET: 5; 325 TABLET ORAL at 09:15

## 2019-06-01 RX ADMIN — METOPROLOL TARTRATE 50 MG: 50 TABLET ORAL at 20:00

## 2019-06-01 RX ADMIN — LORAZEPAM 0.5 MG: 0.5 TABLET ORAL at 00:46

## 2019-06-01 RX ADMIN — IPRATROPIUM BROMIDE AND ALBUTEROL SULFATE 1 AMPULE: .5; 3 SOLUTION RESPIRATORY (INHALATION) at 21:32

## 2019-06-01 RX ADMIN — METOPROLOL TARTRATE 50 MG: 50 TABLET ORAL at 09:16

## 2019-06-01 RX ADMIN — LORAZEPAM 0.5 MG: 0.5 TABLET ORAL at 09:15

## 2019-06-01 RX ADMIN — PREDNISONE 10 MG: 10 TABLET ORAL at 09:15

## 2019-06-01 RX ADMIN — LOSARTAN POTASSIUM 25 MG: 25 TABLET ORAL at 09:16

## 2019-06-01 RX ADMIN — OXYCODONE HYDROCHLORIDE AND ACETAMINOPHEN 1 TABLET: 5; 325 TABLET ORAL at 15:38

## 2019-06-01 ASSESSMENT — PAIN SCALES - GENERAL
PAINLEVEL_OUTOF10: 5
PAINLEVEL_OUTOF10: 5
PAINLEVEL_OUTOF10: 0
PAINLEVEL_OUTOF10: 2
PAINLEVEL_OUTOF10: 0
PAINLEVEL_OUTOF10: 7
PAINLEVEL_OUTOF10: 3
PAINLEVEL_OUTOF10: 7

## 2019-06-01 ASSESSMENT — PAIN DESCRIPTION - LOCATION
LOCATION: HIP;GROIN
LOCATION: HIP

## 2019-06-01 ASSESSMENT — PAIN DESCRIPTION - ORIENTATION
ORIENTATION: RIGHT

## 2019-06-01 ASSESSMENT — PAIN DESCRIPTION - DESCRIPTORS
DESCRIPTORS: SORE
DESCRIPTORS: ACHING
DESCRIPTORS: ACHING

## 2019-06-01 ASSESSMENT — PAIN DESCRIPTION - ONSET: ONSET: ON-GOING

## 2019-06-01 ASSESSMENT — PAIN DESCRIPTION - FREQUENCY
FREQUENCY: INTERMITTENT
FREQUENCY: INTERMITTENT

## 2019-06-01 ASSESSMENT — PAIN DESCRIPTION - PAIN TYPE
TYPE: SURGICAL PAIN
TYPE: SURGICAL PAIN

## 2019-06-01 NOTE — FLOWSHEET NOTE
Confronted pt and  in the room regarding accusations of telling the pt that she could \"poop her pants\" when in fact she was told that  She would have to be patient while  I could find a female to help her. The pt denies saying this or that she called the writer a \" pervert. \" Talked with pt about staff trying to respect pts wishes regarding only female staff-but that the goal of all staff is to try to reduce falls. Able to tell me that she didn't mind me helping other staff pull her up, but that wanted a female nurse to help her in the bathroom. Pt denies saying any of this-insists on being up in room without assistance.

## 2019-06-01 NOTE — PROGRESS NOTES
[place] [address] Internal Medicine    Progress Note    6/1/2019    5:04 PM    Name:   Ananth Cartagena  MRN:     [id number]     Madisyn Shutter:      [de-identified]   Room:   [address] [place] [address] Day:  11  Admit Date:  5/21/2019  6:04 PM    PCP:   Feli Costa MD  Code Status:  Full Code    Subjective:     C/C: No chief complaint on file. Interval History Status: improved. HPI:   Patient has history of rheumatoid arthritis, chronically on steroids, osteoporosis. Patient had right hip fracture, after she sustained a fall.  status post surgery, doing better    Review of Systems:     Constitutional:  negative for chills, fevers, sweats  Respiratory:  negative for cough, dyspnea on exertion, hemoptysis, shortness of breath, wheezing  Cardiovascular:  negative for chest pain, chest pressure/discomfort, lower extremity edema, palpitations  Gastrointestinal:  negative for abdominal pain, constipation, diarrhea, nausea, vomiting  Neurological:  negative for dizziness, headache  Extremity - Pain in Right Hip area   Medications: Allergies:     Allergies   Allergen Reactions    Methotrexate Derivatives Other (See Comments)     unknown    No Known Allergies     Seasonal Other (See Comments)     Congestion       Current Meds:   Scheduled Meds:    vitamin D  50,000 Units Oral Weekly    sennosides-docusate sodium  1 tablet Oral BID    lidocaine  1 patch Transdermal Daily    rivaroxaban  10 mg Oral Daily    ipratropium-albuterol  1 ampule Inhalation Q4H WA    levothyroxine  50 mcg Oral Daily    lidocaine  1 patch Transdermal Daily    losartan  25 mg Oral Daily    metoprolol tartrate  50 mg Oral BID    pantoprazole  40 mg Oral QAM AC    polyethylene glycol  17 g Oral Daily    predniSONE  10 mg Oral Daily     Continuous Infusions:   PRN Meds: furosemide, LORazepam, oxyCODONE-acetaminophen, aluminum & magnesium hydroxide-simethicone, bisacodyl, magnesium hydroxide, acetaminophen    Data: Past Medical History:   has a past medical history of Allergic rhinitis, Anemia, Anxiety, Asthma, Cataract, COPD (chronic obstructive pulmonary disease) ([address] CTS (carpal tunnel syndrome), Esophageal reflux, Headache(784.0), Hyperlipidemia, Hypertension, Hypothyroidism, Osteoarthritis, Osteoporosis, and Rheumatoid arthritis(714.0). Social History:   reports that she has never smoked. She has never used smokeless tobacco. She reports that she does not drink alcohol or use drugs. Family History:   Family History   Problem Relation Age of Onset   [facility] Cancer Mother     Hypertension Mother     Heart Disease Mother     Stroke Maternal Grandmother     Heart Disease Maternal Grandmother     Cancer Paternal Grandmother     Heart Disease Paternal Grandmother     Heart Disease Father     Heart Disease Maternal Grandfather     Heart Disease Paternal Grandfather        Vitals:  BP (!) 122/52   Pulse 84   Temp 97.3 °F (36.3 °C) (Axillary)   Resp 18   Ht 4' 11\" (1.499 m)   Wt 130 lb (59 kg)   SpO2 94%   BMI 26.26 kg/m²   Temp (24hrs), Av.8 °F (36.6 °C), Min:97.3 °F (36.3 °C), Max:98.2 °F (36.8 °C)    Recent Labs     19  1037 19  0632 19  0704 19  1555   POCGLU 133* 108* 100 176*       I/O (24Hr):   No intake or output data in the 24 hours ending 19 1704    Labs:    [unfilled]    Lab Results   Component Value Date/Time    SPECIAL NOT REPORTED 2016 09:16 PM     Lab Results   Component Value Date/Time    CULTURE NORMAL URO-GENITAL SELVIN 2016 09:16 PM    CULTURE NEGATIVE FOR NEISSERIA GONORRHOEAE 2016 09:16 PM    CULTURE  2016 09:16 PM     [employer] [address] [facility], [address] [place] [address] [phone]       [facility]    Radiology:      Physical Examination:        General appearance:  alert, cooperative and no distress, wheel chair Bound   Mental Status:  oriented to person, place and time and normal affect  Lungs:  clear to auscultation bilaterally, normal effort  Heart:  regular rate and rhythm, no murmur  Abdomen:  soft, nontender, nondistended, normal bowel sounds, no masses, hepatomegaly, splenomegaly  Extremities:  no edema, redness, tenderness in the calves  Skin:  no gross lesions, rashes, induration    Assessment:        Primary Problem  <principal problem not specified>    Active Hospital Problems    Diagnosis Date Noted    Bilateral leg edema [R60.0]     S/P right hip fracture [Z87.81] 05/22/2019    History of hemiarthroplasty of right hip [Z96.641] 05/21/2019    Rheumatoid arthritis ([facility] [M06.9] 12/01/2014    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 Right hip fracture status post surgery  2. Rheumatoid arthritis, on steroids her home dose of prednisone was started  3. Patient is on DVT prophylaxis with Xarelto  4. Hypertension, controlled  5.  Hypothyroidism, patient is on Synthroid    5/25  Doing better   Wheel Chair Bound   On [facility]   5/26   Vitamin D is Low , started on Vitamin D Replacement   DOing better   On wheel chair   5/27  Doing better   On Vitamin D supplementation   6/1  Patient is doing Better   Working with PT   On Bebe Singh MD  6/1/2019  5:04 PM

## 2019-06-01 NOTE — FLOWSHEET NOTE
In with pt to pull her up. She goes into detail that the RN told her she \"could poop her pants. \" She states \"I dont want that pervert in with me staring at me in the bathroom\" Talked with RN about this.

## 2019-06-01 NOTE — PROGRESS NOTES
return)  Stand to sit: Supervision, Stand by assistance(Cues for hand placement with good return)  Bed to Chair: Stand by assistance  Comment: RW  Ambulation  Ambulation?: Yes  WB Status: WBAT R  Ambulation 1  Surface: level tile  Device: Rolling Walker  Assistance: Stand by assistance, Supervision  Quality of Gait: slow, steady pace; hip and knee flexion throughout; step-through pattern; pt verbalizes \"heel, toe, push\" while ambulating with little correlation to her movement. Kyphotic; some reduction of flexion throughout as pt verbalizes need for better posture. Gait Deviations: Slow Diane  Distance: 200' a.m./5.5 min.; 200' p.m., 4.5 min. Comments: Pt demo's improved attention to posture today. 200' p.m. walk = 6.5 min. Surface: level tile  Ambulation 1  Surface: level tile  Device: Rolling Walker  Assistance: Stand by assistance, Supervision  Quality of Gait: slow, steady pace; hip and knee flexion throughout; step-through pattern; pt verbalizes \"heel, toe, push\" while ambulating with little correlation to her movement. Kyphotic; some reduction of flexion throughout as pt verbalizes need for better posture. Gait Deviations: Slow Diane  Distance: 200' a.m./5.5 min.; 200' p.m., 4.5 min. Comments: Pt demo's improved attention to posture today. 200' p.m. walk = 6.5 min.     OT:  ADL  Equipment Provided: Reacher  Feeding: Setup  Grooming: Verbal cueing, Setup  UE Bathing: Setup, Verbal cueing  LE Bathing: Minimal assistance  UE Dressing: Contact guard assistance, Supervision, Setup  LE Dressing: Minimal assistance, Stand by assistance, Increased time to complete, Verbal cueing  Toileting: Minimal assistance  Additional Comments: see FIM scores    Instrumental ADL's  Instrumental ADLs: Yes     Balance  Sitting Balance: Independent  Standing Balance: Modified independent    Standing Balance  Time: 7 min, 6 min 3 min   Activity: standing fucntional / hand strenght and reahing activity, standing for balloon toss, no LOB   Comment: mobility from bed to w/c  Functional Mobility  Functional - Mobility Device: Rolling Walker  Activity: To/from bathroom  Assist Level: Supervision  Functional Mobility Comments: Verbal cues using RW to ambulate. Bed mobility  Bridging: Stand by assistance  Rolling to Left: Unable to assess(unable to tolerate- pt resists)  Rolling to Right: Unable to assess(pt unable to tolera0 pt resists)  Supine to Sit: Modified independent  Sit to Supine: Supervision  Scooting: Supervision  Comment: pt seen in chair this date   Transfers  Stand Step Transfers: Supervision  Stand Pivot Transfers: 2 Person assistance(mod x 2 bed to BSC, max x 2 BSC to bed with RW)  Sit to stand: Modified independent  Stand to sit: Modified independent  Transfer Comments: fair safety    Toilet Transfers  Toilet - Technique: Ambulating  Equipment Used: Raised toilet seat with rails  Toilet Transfer: Modified independent  Toilet Transfers Comments: pt remained on toilet at end of session stating hse would need exteded time, RN notified and pt given call light and instructed to pull when complete             SPEECH:      Objective:  BP (!) 122/52   Pulse 84   Temp 97.3 °F (36.3 °C) (Axillary)   Resp 18   Ht 4' 11\" (1.499 m)   Wt 130 lb (59 kg)   SpO2 94%   BMI 26.26 kg/m²       GEN: well developed, well nourished, NAD  HEENT: NCAT, PERRL, EOMI, mucous membranes pink and moist  CV: RRR, no murmurs, rubs or gallops  PULM: CTAB, no rales or rhonchi. Respirations WNL and unlabored  ABD: soft, NT, ND, BS+ and equal  NEURO: A&O x3. Sensation intact to light touch. MSK: Functional ROM BUE and LLEs. Weakness limits ROM RLE. Strength 4+/5 key muscles BUE and LLE. R hip flexion 4-/5, R knee extension 4/5. EXTREMITIES: No calf tenderness to palpation bilaterally. No edema BLEs  SKIN: warm dry and intact with good turgor  PSYCH: appropriately interactive. Affect WNL.      Diagnostics:     CBC:   Recent Labs 05/31/19  0614 06/01/19  0609   WBC 6.6  --    RBC 3.39*  --    HGB 9.2* 9.4*   HCT 28.5* 29.1*   MCV 84.1  --    RDW 17.6*  --      --      BMP:   Recent Labs     05/30/19  0657 05/31/19  0614   NA  --  137   K 3.5* 3.7   CL  --  102   CO2  --  26   BUN  --  15   CREATININE  --  <0.40*   GLUCOSE  --  104*     BNP: No results for input(s): BNP in the last 72 hours. PT/INR: No results for input(s): PROTIME, INR in the last 72 hours. APTT: No results for input(s): APTT in the last 72 hours. CARDIAC ENZYMES: No results for input(s): CKMB, CKMBINDEX, TROPONINT in the last 72 hours. Invalid input(s): CKTOTAL;3  FASTING LIPID PANEL:No results found for: CHOL, HDL, TRIG  LIVER PROFILE: No results for input(s): AST, ALT, ALB, BILIDIR, BILITOT, ALKPHOS in the last 72 hours.      Current Medications:   Current Facility-Administered Medications: furosemide (LASIX) tablet 20 mg, 20 mg, Oral, Daily PRN  LORazepam (ATIVAN) tablet 0.5 mg, 0.5 mg, Oral, Q6H PRN  oxyCODONE-acetaminophen (PERCOCET) 5-325 MG per tablet 1 tablet, 1 tablet, Oral, Q6H PRN  vitamin D (ERGOCALCIFEROL) capsule 50,000 Units, 50,000 Units, Oral, Weekly  aluminum & magnesium hydroxide-simethicone (MAALOX) 200-200-20 MG/5ML suspension 30 mL, 30 mL, Oral, Q6H PRN  sennosides-docusate sodium (SENOKOT-S) 8.6-50 MG tablet 1 tablet, 1 tablet, Oral, BID  bisacodyl (DULCOLAX) suppository 10 mg, 10 mg, Rectal, Daily PRN  magnesium hydroxide (MILK OF MAGNESIA) 400 MG/5ML suspension 30 mL, 30 mL, Oral, Daily PRN  lidocaine 4 % external patch 1 patch, 1 patch, Transdermal, Daily  acetaminophen (TYLENOL) tablet 650 mg, 650 mg, Oral, Q4H PRN  rivaroxaban (XARELTO) tablet 10 mg, 10 mg, Oral, Daily  ipratropium-albuterol (DUONEB) nebulizer solution 1 ampule, 1 ampule, Inhalation, Q4H WA  levothyroxine (SYNTHROID) tablet 50 mcg, 50 mcg, Oral, Daily  lidocaine 4 % external patch 1 patch, 1 patch, Transdermal, Daily  losartan (COZAAR) tablet 25 mg, 25 mg, Oral, Daily  metoprolol tartrate (LOPRESSOR) tablet 50 mg, 50 mg, Oral, BID  pantoprazole (PROTONIX) tablet 40 mg, 40 mg, Oral, QAM AC  polyethylene glycol (GLYCOLAX) packet 17 g, 17 g, Oral, Daily  predniSONE (DELTASONE) tablet 10 mg, 10 mg, Oral, Daily      Impression/Plan:   Impaired ADLs, gait, and mobility due to:      1. R hip fracture s/p hemiarthroplasty: PT/OT for gait, mobility, strengthening, ADLs, and self care. Percocet and Lidoderm patch as ordered for pain. 2. RA: on prednisone. Treats with Dr. Remy Sparrow. 3. Post-op anemia: Hb 9.4. Monitoring H&H  4. Hyperglycemia: on chronic prednisone, will monitor  5. Anxiety: on ativan, tapering dose as tolerated  6. HTN: on cozaar and Lopressor  7. Hypothyroidism: on synthroid  8. Hypokalemia: on KCl repletion and Lasix changed to prn at family request  9. COPD: on Duonebs  10. GERD/Bowel management: on Protonix, Miralax and Senna S as ordered  11. DVT prophylaxis:  Xarelto  12. Internal medicine for medical management      Electronically signed by Alla Chan MD on 6/1/2019 at 10:22 AM      This note is created with the assistance of a speech recognition program.  While intending to generate a document that actually reflects the content of the visit, the document can still have some errors including those of syntax and sound a like substitutions which may escape proof reading. In such instances, actual meaning can be extrapolated by contextual diversion.

## 2019-06-01 NOTE — PROGRESS NOTES
[facility]    ACUTE REHABILITATION OCCUPATIONAL THERAPY  DAILY NOTE    Date: 19  Patient Name: Lucila Correa      Room: 9213/9234-12    MRN: [id number]   : 1935  (80 y.o.)  Gender: female   Referring Practitioner: Robert Lopez / Frank Sanford / Matthieu Blackwood   (rehab)       Dr. 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 : (R hip hemiarthroplasty 19; L MARYANN)  Hip Precautions: No hip flexion > 90 degrees, No hip extension, No hip external rotation, No ADduction  Other position/activity restrictions: Posterolateral approach - no hip flexion greater than 90 degrees, no hip adduction across midline, no hip hyperextension, and no external rotation. Right Lower Extremity Weight Bearing: Weight Bearing As Tolerated  Required Braces or Orthoses?: No    Subjective  Subjective: \"I don't want no man helping me. \" Pt perseverating on potential male A this date c toileting. Pt demo max difficulty redirecting. Comments: Pt requires VC to tend to task. Patient Currently in Pain: Denies  Pain Level: 7  Pain Location: Hip  Pain Orientation: Right  Restrictions/Precautions: Fall Risk;Weight Bearing  Overall Orientation Status: Within Functional Limits  Orientation Level: Oriented to person;Oriented to situation;Oriented to place  Patient Observation  Observations: Pt perseverating on male employees 'assaulting her' by offering A c toileting. Writer educated pt on unit, pt need for A, and self care expectations. Max difficulty redirecting pt; Nursing is all of pt complaints and is sending in female employees only.   Pain Assessment  Pain Assessment: Off Unit  Pain Level: 7  Pain Type: Acute pain  Pain Location: Hip  Pain Orientation: Right  Pain Descriptors: Aching  Pain Frequency: Intermittent  Response to Pain 6 - Independent with all tasks using assistive device  Bathin - Able to bathe all 10 areas with device(completes ana care only)  Dressing-Upper: 5 - Requires setup/supervision/cues and/or requires assist with presthesis/brace only  Dressing-Lower: 5 - Requires setup/supervision/cues and/or staff applies TEDS/prosthesis/brace only  Toiletin - Requires setup/supervision/cues  Primary Mode: Shower  Tub Transfer: 0 - Activity does not occur  Shower Transfer: 0 - Activity does not occur(requested sinkside)    Social Interaction: 5 - Patient is appropriate with supervision/cues  Problem Solvin - Patient able to solve simple/routine tasks  Memory: 4 - Patient remembers 75-90%+ of the time    Assessment  Performance deficits / Impairments: Decreased functional mobility ; Decreased ADL status; Decreased ROM; Decreased strength;Decreased safe awareness;Decreased cognition;Decreased endurance;Decreased balance;Decreased high-level IADLs;Decreased coordination  Assessment: pt limited by perservation this date  Prognosis: Good  Discharge Recommendations: Home with assist PRN;Home with Home health OT; Home with nursing aide  Activity Tolerance: Patient Tolerated treatment well  Safety Devices in place: Yes  Type of devices: Call light within reach;Nurse notified(left on toilet )  Equipment Recommendations  Equipment Needed: Yes  Reacher: for LBD  Sock Aid: X  Other: Long Handled Sponge       Patient Education:  Patient Goals   Patient goals :  To Return Home able to care for herself   Patient Education: safety during toileting   Learner:patient  Method: demonstration and explanation       Outcome: needs reinforcement safety     Plan  Plan  Times per week: 900/7   Times per day: Twice a day  Current Treatment Recommendations: Self-Care / ADL, Balance Training, Functional Mobility Training, Endurance Training, Safety Education & Training, Patient/Caregiver Education & Training, Positioning, Strengthening, ROM, Cognitive Reorientation, Home Management Training  Plan Comment: continue OT  Patient Goals   Patient goals : To Return Home able to care for herself   Short term goals  Time Frame for Short term goals: 1 week   Short term goal 1: Pt will V/D Good understanding of AE/DME/modified techniques for increased IND with self-care and mobility. Short term goal 2: Pt will actively participate in 10+ minutes of self-care to the best of Pt's ability to promote increased IND with self-care. Short term goal 3: Pt will perform bed mobility with Min A to sit EOB for increased participation with self-care. Short term goal 4: Pt will perform sit to stand transfer with Min A with RW with Good safety awareness. Short term goal 5: Pt will actively participate in 10-15 minutes in therapeutic exercise/functional activities to promote increased IND with self-care and mobility.   Long term goals  Time Frame for Long term goals : By Discharge   Long term goal 1: D/V understanding of Joint protection strategies with application to care needs   Long term goal 2: Mod I for basic premorbid self care tasks    Long term goal 3: Tolerate standing for 10 minutes for self care and home management tasks         06/01/19 1225 06/01/19 1445   OT Individual Minutes   Time In 9731 1231   Time Out 0838 1300   Minutes 64 29     Electronically signed by Brown Hodgkin Leu, OTA on 6/1/19 at 3:47 PM

## 2019-06-01 NOTE — FLOWSHEET NOTE
Pt calls out this AM for someone to take her to the bathroom. She voices extreme displeasure at the writer offering to take her to the bathroom being a male. Explained to pt that nobody else was available-she again refused. Explained to pt that she would have to wait until I could help find a female to accompany her. Found \"Polly\" one minute later who attended to pt.

## 2019-06-01 NOTE — PROGRESS NOTES
Physical Therapy  [address]  Acute Rehabilitation Physical Therapy Progress Note    Date: 19  Patient Name: [facility]       Room: [facility]/2481-16  MRN: [id number]   Account: [de-identified]   : 1935  (80 y.o.) Gender: female     Referring Practitioner: Robert Wesley / Hamzah Corrales / Paulo Zarate   (rehab)       Dr. La Mayers (ortho)   Diagnosis: S/P R hip hemiarthroplasty 19  Past Medical History:  has a past medical history of Allergic rhinitis, Anemia, Anxiety, Asthma, Cataract, COPD (chronic obstructive pulmonary disease) ([address] CTS (carpal tunnel syndrome), Esophageal reflux, Headache(784.0), Hyperlipidemia, Hypertension, Hypothyroidism, Osteoarthritis, Osteoporosis, and Rheumatoid arthritis(714.0). Past Surgical History:   has a past surgical history that includes Rotator cuff repair (Left); Tonsillectomy and adenoidectomy; Hysterectomy, vaginal; Foot surgery; Hemorrhoid surgery; Dilation and curettage of uterus; Cataract removal with implant (Bilateral); Inguinal hernia repair; Cystocele repair; joint replacement (Left); other surgical history; Hammer toe surgery; other surgical history; and HEMIARTHROPLASTY HIP (Right, 2019). Additional Pertinent Hx: RA     Restrictions/Precautions  Restrictions/Precautions: Fall Risk;Weight Bearing  Required Braces or Orthoses?: No  Implants present? : (R hip hemiarthroplasty 19; L MARYANN)  Lower Extremity Weight Bearing Restrictions  Right Lower Extremity Weight Bearing: Weight Bearing As Tolerated  Position Activity Restriction  Hip Precautions: No hip flexion > 90 degrees; No hip extension; No hip external rotation; No ADduction(No hip *hyper*extension, no ADD past midline)  Other position/activity restrictions: Posterolateral approach - no hip flexion greater than 90 degrees, no hip adduction across midline, no hip hyperextension, and no external rotation.     Subjective: Pt agitated about male RN offering toileting assistance upon using call light, perseverating on topic. Comments: Extra time req'd for toileting; pt talkative, but can be redirected to exercise while talking. Dislikes UBE, NuStep. Vital Signs  Patient Currently in Pain: Yes  Pain Assessment: Faces  Pain Level: 5  Pain Type: Surgical pain  Pain Location: Hip;Groin  Pain Orientation: Right  Pain Descriptors: Sore  Non-Pharmaceutical Pain Intervention(s): Repositioned;Rest;Ambulation/Increased Activity  Response to Pain Intervention: Patient Satisfied     Bed Mobility  Rolling: Rolling Left;Supervision(complains of increased pain on roll R attempt)  Supine to Sit: Modified independent  Sit to Supine: Supervision  Scooting: Supervision  Comment: Mat, wedge, 3 pillows    Transfers:  Sit to Stand: Supervision  Stand to sit: Supervision  Stand Pivot Transfers: Supervision   Comment: RW. Good hand placement demo'd today throughout. WB Status: WBAT R  Ambulation 1  Surface: level tile  Device: Rolling Walker  Assistance: Supervision;Modified Independent  Quality of Gait: slow, steady pace; hip and knee flexion throughout; step-through pattern. Kyphotic; some reduction of flexion throughout as pt verbalizes need for better posture. Pt talking entire time, expressing agitation over encounters with male staff members. Gait Deviations: Slow Diane  Distance: 550'  Comments: Pt requests repeated walks (3x) down santos, appears to be venting, but also engaging in positive self-talk, encouraging larger steps, better posture with good result. Ambulation 2  Surface - 2: level tile  Device 2: Rolling Walker  Assistance 2: Modified Independent  Quality of Gait 2: slow, steady pace; hip and knee flexion throughout; step-through pattern. Larger steps noted; pt still self-correcting posture every few steps, but without interruption to walk.    Distance: 175'     Stairs/Curb  Stairs?: Yes  Stairs  # Steps : 20  Stairs Height: (4\"/6\")  Rails: Bilateral  Device: No Device  Assistance: Supervision  Comment: Step-to pattern. Pt verbalizes and adopts proper sequencing today, seeks reassurance or pauses occasionally to re-verbalize correct sequencing. FIMS:  TRANSFERS  Bed, Chair, Wheel Chair: 5 - Requires setup/supervision/cues   LOCOMOTION  Primary Mode: Walk  Distance Walked: 550'(RW)  Walk: 5 - Supervision Requires standby supervision or cuing to walk at least 150 feet  Stairs: 5- Supervision Requires supervision(e.g., standing by, cuing, or coaxing) to go up and down one flight of stairs(20 4\"/6\" steps, B HRs)     BALANCE Posture: Fair(Kyphotic, F head)  Sitting - Static: Good(EOM, no support)  Sitting - Dynamic: Fair;+(EOM, no back support)  Standing - Static: Fair;+(RW)  Standing - Dynamic: Fair(RW)  Comments: Can stand for several minutes without UE support    EXERCISES    Other exercises?: Yes  Other exercises 3: Supine bilateral LE, 10-15x each / to pt tolerance, AROM R LE, 1.5# L LE  Other exercises 4: Dynamic standing balance, 5 min., no/intermittent brief single UE support  Other exercises 5: UBE, 1 min. F(Pillow strapped between LEs to emphasize neutral hip position; time cut short by need to use toilet.)  Other exercises 7:  Toilet transfer x1(Req'd 20 min.; safety issues (walking w/clothing at feet))  Other exercises 9: Cable column, pull-downs & rows, 5#, 20 reps each  Other exercises 11: Bed mobility     Activity Tolerance: Patient Tolerated treatment well(Rest breaks PRN)  PT Equipment Recommendations  Equipment Needed: No     Current Treatment Recommendations: Strengthening, ROM, Balance Training, Transfer Training, Gait Training, Stair training, Neuromuscular Re-education, Functional Mobility Training, Home Exercise Program, Safety Education & Training, Patient/Caregiver Education & Training, Wheelchair Mobility Training, Equipment Evaluation, Education, & procurement    Conditions Requiring Skilled Therapeutic Intervention  Body structures, Functions, Activity limitations: Decreased functional mobility ; Decreased ADL status; Decreased ROM; Decreased strength  Assessment: Pt perseverating on issues outside scope of PT tx, attempts to redirect with fleeting/little effect. REQUIRES PT FOLLOW UP: Yes  Discharge Recommendations: Home with assist PRN;Home with Home health PT    Goals  Short term goals  Time Frame for Short term goals: by 1 week  Short term goal 1: supervision bed mobility  Short term goal 2: transfers Min A from multiple surfaces  Short term goal 3: Ambulate 30 ft with RW in < 3 min   Short term goal 4: Amb up/down 5 stairs el HR mod A x 1  Short term goal 5: min A WC mobility 25 ft using BLE.   Long term goals  Time Frame for Long term goals : by discharge  Long term goal 1: Bed mobility Mod I  Long term goal 2: Transfers Mod I multiple surfaces  Long term goal 3: Ambulate 50-60 ft MI with RW  Long term goal 4: Amb up/down 5 steps el HR min A x 1  Long term goal 5: pt will be indep with wc mobility 50 ft     06/01/19 1019 06/01/19 1559   PT Individual Minutes   Time In [id number]   Time Out 1055 1359   Minutes 37 41   PT Concurrent Minutes   Time In  --  1300   Time Out  --  1317   Minutes  --  17     Electronically signed by Nicolasa Dexter PTA on 6/1/19 at 4:48 PM

## 2019-06-01 NOTE — PLAN OF CARE
intact and well approximated  6/1/2019 0714 by Sophie Muhammad RN  Outcome: Ongoing  Note:   Skin assessment completed this shift. Nutrition and Hydration status assessed with adequate intake. Terry Score as charted. Bilateral heels remain elevated on pillows throughout the shift. Patient able to reposition self for comfort and to prevent breakdown. Patient verbalizes understanding of pressure ulcer prevention measures. Skin integrity maintained. No new skin breakdown noted. Skin to high risk pressure areas including coccyx and heels are clear. Michaelle care assisted with as needed throughout the shift. Right hip incision intact with staples. Medipore dressing removed due to irritation and fluff gauze applied to distal aspect of incision. Rest of incision left DENISSE. Problem: Falls - Risk of:  Goal: Will remain free from falls  Description  Will remain free from falls  6/1/2019 1616 by Jim Corrigan RN  Outcome: Ongoing  Note:   No falls. Up with assist with walker  6/1/2019 0714 by Sophie Muhammad RN  Outcome: Ongoing  Note:   No falls or injuries sustained at this time. No attempts to get out of bed without nursing assistance. Call light within reach and pt. uses appropriately for assistance. Siderails up x 2. Nonskid footwear remains on. Bed in low and locked position. Hourly nursing rounds made. Pt. Alert and oriented, aware of limitations, and exhibits good safety judgement. Pt. uses assistive devices appropriately. Pt. understands individual fall risk factors. Pt. reoriented to surroundings and reminded to use call light with each nurse/patient interaction. Pt. room located close to nurse's station. Bed alarm remains engaged throughout the shift as a precaution.      Goal: Absence of physical injury  Description  Absence of physical injury  Outcome: Ongoing     Problem: Nutrition  Goal: Optimal nutrition therapy  Outcome: Ongoing

## 2019-06-01 NOTE — FLOWSHEET NOTE
Confronted pt and  in the room regarding accusations of telling the pt that she could \"poop her pants\" when in fact she was told that she would have to be patient while I coulad find a female to help her. The pt denies saying this or that she called the writer a \"pervert\". Talked with pt about staff trying to respect pts wishes regarding only female staff but the goal of all staff was to prevent falls. Confronted pt on these two accusations which she now denies saying.

## 2019-06-01 NOTE — PLAN OF CARE
Problem: Pain:  Goal: Control of acute pain  Description  Control of acute pain  Outcome: Ongoing  Note:   Pain assessment completed. Pt. able to rest.  Patient medicated with 1 tab Percocet for pain this shift as ordered. Patient relates a tolerable level of discomfort with the current medication. Pt. Repositions per self for comfort. Nonverbal cues indicate pain relief. Pt. Rests quietly with eyes closed after pain medication administration. Respirations easy and unlabored. Appears free from distress. Problem: Risk for Impaired Skin Integrity  Goal: Tissue integrity - skin and mucous membranes  Description  Structural intactness and normal physiological function of skin and  mucous membranes. Outcome: Ongoing  Note:   Skin assessment completed this shift. Nutrition and Hydration status assessed with adequate intake. Terry Score as charted. Bilateral heels remain elevated on pillows throughout the shift. Patient able to reposition self for comfort and to prevent breakdown. Patient verbalizes understanding of pressure ulcer prevention measures. Skin integrity maintained. No new skin breakdown noted. Skin to high risk pressure areas including coccyx and heels are clear. Michaelle care assisted with as needed throughout the shift. Right hip incision intact with staples. Medipore dressing removed due to irritation and fluff gauze applied to distal aspect of incision where draining. Rest of incision left DENISSE. Problem: Falls - Risk of:  Goal: Will remain free from falls  Description  Will remain free from falls  Outcome: Ongoing  Note:   No falls or injuries sustained at this time. No attempts to get out of bed without nursing assistance. Call light within reach and pt. uses appropriately for assistance. Siderails up x 2. Nonskid footwear remains on. Bed in low and locked position. Hourly nursing rounds made. Pt. Alert and oriented, aware of limitations, and exhibits good safety judgement.  Pt. uses assistive devices appropriately. Pt. understands individual fall risk factors. Pt. reoriented to surroundings and reminded to use call light with each nurse/patient interaction. Pt. room located close to nurse's station. Bed alarm remains engaged throughout the shift as a precaution.

## 2019-06-02 PROCEDURE — 97110 THERAPEUTIC EXERCISES: CPT

## 2019-06-02 PROCEDURE — 97542 WHEELCHAIR MNGMENT TRAINING: CPT

## 2019-06-02 PROCEDURE — 94761 N-INVAS EAR/PLS OXIMETRY MLT: CPT

## 2019-06-02 PROCEDURE — 1180000000 HC REHAB R&B

## 2019-06-02 PROCEDURE — 97530 THERAPEUTIC ACTIVITIES: CPT

## 2019-06-02 PROCEDURE — 6370000000 HC RX 637 (ALT 250 FOR IP): Performed by: ORTHOPAEDIC SURGERY

## 2019-06-02 PROCEDURE — 99231 SBSQ HOSP IP/OBS SF/LOW 25: CPT | Performed by: INTERNAL MEDICINE

## 2019-06-02 PROCEDURE — 6370000000 HC RX 637 (ALT 250 FOR IP): Performed by: INTERNAL MEDICINE

## 2019-06-02 PROCEDURE — 97116 GAIT TRAINING THERAPY: CPT

## 2019-06-02 PROCEDURE — 97535 SELF CARE MNGMENT TRAINING: CPT

## 2019-06-02 PROCEDURE — 99232 SBSQ HOSP IP/OBS MODERATE 35: CPT | Performed by: PHYSICAL MEDICINE & REHABILITATION

## 2019-06-02 PROCEDURE — 6370000000 HC RX 637 (ALT 250 FOR IP): Performed by: PHYSICAL MEDICINE & REHABILITATION

## 2019-06-02 PROCEDURE — 94640 AIRWAY INHALATION TREATMENT: CPT

## 2019-06-02 RX ADMIN — SENNOSIDES,DOCUSATE SODIUM 1 TABLET: 8.6; 5 TABLET, FILM COATED ORAL at 08:28

## 2019-06-02 RX ADMIN — LORAZEPAM 0.5 MG: 0.5 TABLET ORAL at 20:54

## 2019-06-02 RX ADMIN — METOPROLOL TARTRATE 50 MG: 50 TABLET ORAL at 20:15

## 2019-06-02 RX ADMIN — LOSARTAN POTASSIUM 25 MG: 25 TABLET ORAL at 08:28

## 2019-06-02 RX ADMIN — IPRATROPIUM BROMIDE AND ALBUTEROL SULFATE 1 AMPULE: .5; 3 SOLUTION RESPIRATORY (INHALATION) at 19:04

## 2019-06-02 RX ADMIN — PREDNISONE 10 MG: 10 TABLET ORAL at 08:28

## 2019-06-02 RX ADMIN — IPRATROPIUM BROMIDE AND ALBUTEROL SULFATE 1 AMPULE: .5; 3 SOLUTION RESPIRATORY (INHALATION) at 14:35

## 2019-06-02 RX ADMIN — LORAZEPAM 0.5 MG: 0.5 TABLET ORAL at 15:01

## 2019-06-02 RX ADMIN — IPRATROPIUM BROMIDE AND ALBUTEROL SULFATE 1 AMPULE: .5; 3 SOLUTION RESPIRATORY (INHALATION) at 06:58

## 2019-06-02 RX ADMIN — PANTOPRAZOLE SODIUM 40 MG: 40 TABLET, DELAYED RELEASE ORAL at 06:15

## 2019-06-02 RX ADMIN — OXYCODONE HYDROCHLORIDE AND ACETAMINOPHEN 1 TABLET: 5; 325 TABLET ORAL at 08:27

## 2019-06-02 RX ADMIN — OXYCODONE HYDROCHLORIDE AND ACETAMINOPHEN 1 TABLET: 5; 325 TABLET ORAL at 20:55

## 2019-06-02 RX ADMIN — ERGOCALCIFEROL 50000 UNITS: 1.25 CAPSULE ORAL at 08:33

## 2019-06-02 RX ADMIN — RIVAROXABAN 10 MG: 10 TABLET, FILM COATED ORAL at 17:57

## 2019-06-02 RX ADMIN — LEVOTHYROXINE SODIUM 50 MCG: 50 TABLET ORAL at 06:16

## 2019-06-02 RX ADMIN — METOPROLOL TARTRATE 50 MG: 50 TABLET ORAL at 08:33

## 2019-06-02 RX ADMIN — OXYCODONE HYDROCHLORIDE AND ACETAMINOPHEN 1 TABLET: 5; 325 TABLET ORAL at 14:52

## 2019-06-02 ASSESSMENT — PAIN DESCRIPTION - LOCATION
LOCATION: COCCYX
LOCATION: COCCYX
LOCATION: HIP

## 2019-06-02 ASSESSMENT — PAIN DESCRIPTION - PAIN TYPE
TYPE: SURGICAL PAIN
TYPE: CHRONIC PAIN
TYPE: CHRONIC PAIN

## 2019-06-02 ASSESSMENT — PAIN SCALES - GENERAL
PAINLEVEL_OUTOF10: 3
PAINLEVEL_OUTOF10: 6
PAINLEVEL_OUTOF10: 6
PAINLEVEL_OUTOF10: 7
PAINLEVEL_OUTOF10: 0
PAINLEVEL_OUTOF10: 3

## 2019-06-02 ASSESSMENT — PAIN DESCRIPTION - DESCRIPTORS
DESCRIPTORS: ACHING
DESCRIPTORS: ACHING

## 2019-06-02 ASSESSMENT — PAIN DESCRIPTION - ORIENTATION: ORIENTATION: RIGHT

## 2019-06-02 NOTE — PROGRESS NOTES
Physical Medicine & Rehabilitation  Progress Note      Subjective:      80year-old female with R hip fracture s/p hemiarthroplasty    Patient is well, and has had no acute complaints or problems    ROS:  Denies fevers, chills, sweats. No chest pain, palpitations, lightheadedness. Denies coughing, wheezing or shortness of breath. Denies abdominal pain, nausea, diarrhea or constipation. No new areas of joint pain. Denies new areas of numbness or weakness. Denies new anxiety or depression issues. No new skin problems. Rehabilitation:   Progressing in therapies. PT:  Restrictions/Precautions: Fall Risk, Weight Bearing  Implants present? : (R hip hemiarthroplasty 5/17/19; L MARYANN)  Hip Precautions: No hip flexion > 90 degrees, No hip extension, No hip external rotation, No ADduction(No hip *hyper*extension, no ADD past midline)  Other position/activity restrictions: Posterolateral approach - no hip flexion greater than 90 degrees, no hip adduction across midline, no hip hyperextension, and no external rotation. Right Lower Extremity Weight Bearing: Weight Bearing As Tolerated   Transfers  Sit to Stand: Supervision  Stand to sit: Supervision  Bed to Chair: Stand by assistance  Comment: RW. Good hand placement demo'd today throughout. WB Status: WBAT R  Ambulation 1  Surface: level tile  Device: Rolling Walker  Assistance: Supervision, Modified Independent  Quality of Gait: slow, steady pace; hip and knee flexion throughout; step-through pattern. Kyphotic; some reduction of flexion throughout as pt verbalizes need for better posture. Pt talking entire time, expressing agitation over encounters with male staff members. Gait Deviations: Slow Diane  Distance: 550'  Comments: Pt requests repeated walks (3x) down santos, appears to be venting, but also engaging in positive self-talk, encouraging larger steps, better posture with good result.     Transfers  Sit to Stand: Supervision  Stand to sit: Supervision  Bed to Chair: Stand by assistance  Comment: RW. Good hand placement demo'd today throughout. Ambulation  Ambulation?: Yes  WB Status: WBAT R  Ambulation 1  Surface: level tile  Device: Rolling Walker  Assistance: Supervision, Modified Independent  Quality of Gait: slow, steady pace; hip and knee flexion throughout; step-through pattern. Kyphotic; some reduction of flexion throughout as pt verbalizes need for better posture. Pt talking entire time, expressing agitation over encounters with male staff members. Gait Deviations: Slow Diane  Distance: 550'  Comments: Pt requests repeated walks (3x) down santos, appears to be venting, but also engaging in positive self-talk, encouraging larger steps, better posture with good result. Surface: level tile  Ambulation 1  Surface: level tile  Device: Rolling Walker  Assistance: Supervision, Modified Independent  Quality of Gait: slow, steady pace; hip and knee flexion throughout; step-through pattern. Kyphotic; some reduction of flexion throughout as pt verbalizes need for better posture. Pt talking entire time, expressing agitation over encounters with male staff members. Gait Deviations: Slow Diane  Distance: 550'  Comments: Pt requests repeated walks (3x) down santos, appears to be venting, but also engaging in positive self-talk, encouraging larger steps, better posture with good result. OT:  ADL  Equipment Provided: Reacher  Feeding: Setup  Grooming: Verbal cueing, Setup  UE Bathing: Setup, Verbal cueing  LE Bathing: Minimal assistance  UE Dressing: Contact guard assistance, Supervision, Setup  LE Dressing: Minimal assistance, Stand by assistance, Increased time to complete, Verbal cueing  Toileting: Minimal assistance  Additional Comments: see FIM scores.  In PM pt completed toileting and demo max difficulty maintaining position of pants, theyre XL and soft material; VC for modified tech, pt non compliant (pt currently tying pants string to RW and and LLEs. Weakness limits ROM RLE. Strength 4+/5 key muscles BUE and LLE. R hip flexion 4-/5, R knee extension 4/5. EXTREMITIES: No calf tenderness to palpation bilaterally. No edema BLEs  SKIN: warm dry and intact with good turgor. R lateral hip incision well approximated with staples intact, mild serous drainage. PSYCH: appropriately interactive. Affect WNL. Diagnostics:     CBC:   Recent Labs     05/31/19  0614 06/01/19  0609   WBC 6.6  --    RBC 3.39*  --    HGB 9.2* 9.4*   HCT 28.5* 29.1*   MCV 84.1  --    RDW 17.6*  --      --      BMP:   Recent Labs     05/31/19  0614      K 3.7      CO2 26   BUN 15   CREATININE <0.40*   GLUCOSE 104*     BNP: No results for input(s): BNP in the last 72 hours. PT/INR: No results for input(s): PROTIME, INR in the last 72 hours. APTT: No results for input(s): APTT in the last 72 hours. CARDIAC ENZYMES: No results for input(s): CKMB, CKMBINDEX, TROPONINT in the last 72 hours. Invalid input(s): CKTOTAL;3  FASTING LIPID PANEL:No results found for: CHOL, HDL, TRIG  LIVER PROFILE: No results for input(s): AST, ALT, ALB, BILIDIR, BILITOT, ALKPHOS in the last 72 hours.      Current Medications:   Current Facility-Administered Medications: furosemide (LASIX) tablet 20 mg, 20 mg, Oral, Daily PRN  LORazepam (ATIVAN) tablet 0.5 mg, 0.5 mg, Oral, Q6H PRN  oxyCODONE-acetaminophen (PERCOCET) 5-325 MG per tablet 1 tablet, 1 tablet, Oral, Q6H PRN  vitamin D (ERGOCALCIFEROL) capsule 50,000 Units, 50,000 Units, Oral, Weekly  aluminum & magnesium hydroxide-simethicone (MAALOX) 200-200-20 MG/5ML suspension 30 mL, 30 mL, Oral, Q6H PRN  sennosides-docusate sodium (SENOKOT-S) 8.6-50 MG tablet 1 tablet, 1 tablet, Oral, BID  bisacodyl (DULCOLAX) suppository 10 mg, 10 mg, Rectal, Daily PRN  magnesium hydroxide (MILK OF MAGNESIA) 400 MG/5ML suspension 30 mL, 30 mL, Oral, Daily PRN  lidocaine 4 % external patch 1 patch, 1 patch, Transdermal, Daily  acetaminophen (TYLENOL) tablet 650 mg, 650 mg, Oral, Q4H PRN  rivaroxaban (XARELTO) tablet 10 mg, 10 mg, Oral, Daily  ipratropium-albuterol (DUONEB) nebulizer solution 1 ampule, 1 ampule, Inhalation, Q4H WA  levothyroxine (SYNTHROID) tablet 50 mcg, 50 mcg, Oral, Daily  lidocaine 4 % external patch 1 patch, 1 patch, Transdermal, Daily  losartan (COZAAR) tablet 25 mg, 25 mg, Oral, Daily  metoprolol tartrate (LOPRESSOR) tablet 50 mg, 50 mg, Oral, BID  pantoprazole (PROTONIX) tablet 40 mg, 40 mg, Oral, QAM AC  polyethylene glycol (GLYCOLAX) packet 17 g, 17 g, Oral, Daily  predniSONE (DELTASONE) tablet 10 mg, 10 mg, Oral, Daily      Impression/Plan:   Impaired ADLs, gait, and mobility due to:      1. R hip fracture s/p hemiarthroplasty: PT/OT for gait, mobility, strengthening, ADLs, and self care. Percocet and Lidoderm patch as ordered for pain (lidoderm on B knees). 2. RA: on prednisone. Treats with Dr. Ashia Snowden. Developed pulmonary complications on methotrexate. 3. Post-op anemia: Hb 9.4 on 6/. Monitoring H&H  4. Hyperglycemia: on chronic prednisone, will monitor  5. Anxiety: on ativan, tapering dose as tolerated  6. HTN: on cozaar and Lopressor  7. Hypothyroidism: on synthroid  8. Hypokalemia: on KCl repletion and Lasix changed to prn at family request  9. COPD: on Duonebs  10. GERD/Bowel management: on Protonix, Miralax and Senna S as ordered  11. DVT prophylaxis:  Xarelto  12. Internal medicine for medical management      Electronically signed by Colby Rouse MD on 6/2/2019 at 10:07 AM      This note is created with the assistance of a speech recognition program.  While intending to generate a document that actually reflects the content of the visit, the document can still have some errors including those of syntax and sound a like substitutions which may escape proof reading. In such instances, actual meaning can be extrapolated by contextual diversion.

## 2019-06-02 NOTE — PROGRESS NOTES
[address]   ACUTE REHABILITATION OCCUPATIONAL THERAPY  DAILY NOTE    Date: 19  Patient Name: Marla Soares      Room: 5686/2118-53    MRN: [id number]   : 1935  (80 y.o.)  Gender: female   Referring Practitioner: Robert Lawton / Yvette Wallace / Harriett Dumont   (rehab)       Dr. 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 : (R hip hemiarthroplasty 19; L MARYANN)  Hip Precautions: No hip flexion > 90 degrees, No hip extension, No hip external rotation, No ADduction(No hip *hyper*extension, no ADD past midline)  Other position/activity restrictions: Posterolateral approach - no hip flexion greater than 90 degrees, no hip adduction across midline, no hip hyperextension, and no external rotation. Right Lower Extremity Weight Bearing: Weight Bearing As Tolerated  Required Braces or Orthoses?: No    Subjective  Subjective: \"Where do I get that? \" referring to AE, noted below  Comments: Pt requires VC to tend to task. Pt pleasant, talkative. Directable c straightforwardness regarding need for therapy focus. Pt noted difficulty c bowel movements this date.   Patient Currently in Pain: Yes  Pain Level: 3  Pain Location: Coccyx  Restrictions/Precautions: Fall Risk;Weight Bearing  Overall Orientation Status: Within Functional Limits  Orientation Level: Oriented to person;Oriented to situation;Oriented to place  Patient Observation  Observations: pt is MI in her room for toileting  Pain Assessment  Pain Assessment: Faces  Pain Level: 3  Pain Type: Chronic pain  Pain Location: Coccyx  Pain Orientation: Right  Pain Descriptors: Aching  Pain Frequency: Intermittent  Response to Pain Intervention: Patient Satisfied    Objective  Perception  Overall Perceptual Status: WFL  Perseveration: Perseverates during conversation  Balance  Sitting Balance: Independent  Standing Balance: Modified independent   Bed mobility  Supine to Sit: Modified independent  Sit to Supine: Modified independent  Scooting: Modified independent  Transfers  Sit to stand: Modified independent  Stand to sit: Modified independent  Standing Balance  Time: ~15min, 10min x2  Activity: standing functional / hand strenght and reahing activity, standing for balloon toss, no LOB   Comment: ADLS, mobility in room  Functional Mobility  Functional - Mobility Device: Rolling Walker  Activity: To/from bathroom  Assist Level: Modified independent   Toilet Transfers  Toilet - Technique: Ambulating  Equipment Used: Raised toilet seat with rails  Toilet Transfer: Modified independent  Fine Motor: RA deficits BUE                       Vision - Basic Assessment  Patient Visual Report: No visual complaint reported. Vision  Patient Visual Report: No visual complaint reported. ADL  Equipment Provided: Reacher;Sock aid;Long-handled shoe horn;Long-handled sponge;Dressing stick  Additional Comments: See FIM. Toileting required in AM/PM treatments. PM tx trialled standard sockaid 2x progressing demo increased fxl use and I; initially required Wide version 2* edema in feet. Pt able to don bilteral footies using sock aid c increased time; educated on modified tech if swelling increases. Pt using 'stitcher' (reacher to thread pants,doff footies). Pt. s.o. will be obtianing hip kit; writer reviewed fxl use of items and relativity to hip injury. G V understanding noted.            OT FIM:   Groomin - Independent with all tasks using assistive device  Bathin - Able to bathe all 10 areas with device(completes ana care only)  Dressing-Upper: 5 - Requires setup/supervision/cues and/or requires assist with presthesis/brace only  Dressing-Lower: 5 - Requires setup/supervision/cues and/or staff applies TEDS/prosthesis/brace only  Toiletin - Requires setup/supervision/cues  Toilet Transfer: 6 - Independent with device (grab bar/walker/slide bar)  Primary Mode: Shower  Tub Transfer: 0 - Activity does not occur  Shower Transfer: 0 - Activity does not occur(requested sinkside)    Social Interaction: 5 - Patient is appropriate with supervision/cues  Problem Solvin - Patient able to solve simple/routine tasks  Memory: 5 - Patient requires prompting with stress/unfamiliar situations    Assessment  Performance deficits / Impairments: Decreased functional mobility ; Decreased ADL status; Decreased ROM; Decreased strength;Decreased safe awareness;Decreased cognition;Decreased endurance;Decreased balance;Decreased high-level IADLs;Decreased coordination  Assessment: pt limited by perservation this date  Prognosis: Good  Discharge Recommendations: Home with assist PRN;Home with Home health OT; Home with nursing aide  Activity Tolerance: Patient Tolerated treatment well  Activity Tolerance:  mobility goals were met for today, pt more confident in her ability to SPT today, not agitated  Safety Devices in place: Yes  Type of devices: Call light within reach;Nurse notified(left on toilet )  Equipment Recommendations  Equipment Needed: Yes  Reacher: for LBD  Sock Aid: X  Other: Long Handled Sponge       Patient Education:  Patient Goals   Patient goals : To Return Home able to care for herself   Patient Education: safety during toileting   Learner:patient and significant other  Method: demonstration, explanation and handout       Outcome: demonstrated understanding     Plan    Pt to benefit from Valentin Colon 15, not established this date. Tomorrow is performance day.     Plan  Times per week: 900/7   Times per day: Twice a day  Current Treatment Recommendations: Self-Care / ADL, Balance Training, Functional Mobility Training, Endurance Training, Safety Education & Training, Patient/Caregiver Education & Training, Positioning, Strengthening, ROM, Cognitive Reorientation, Home Management Training  Plan Comment: continue OT  Patient Goals   Patient goals : To Return Home able to care for herself   Short term goals  Time Frame for Short term goals: 1 week   Short term goal 1: Pt will V/D Good understanding of AE/DME/modified techniques for increased IND with self-care and mobility. Short term goal 2: Pt will actively participate in 10+ minutes of self-care to the best of Pt's ability to promote increased IND with self-care. Short term goal 3: Pt will perform bed mobility with Min A to sit EOB for increased participation with self-care. Short term goal 4: Pt will perform sit to stand transfer with Min A with RW with Good safety awareness. Short term goal 5: Pt will actively participate in 10-15 minutes in therapeutic exercise/functional activities to promote increased IND with self-care and mobility.   Long term goals  Time Frame for Long term goals : By Discharge   Long term goal 1: D/V understanding of Joint protection strategies with application to care needs   Long term goal 2: Mod I for basic premorbid self care tasks    Long term goal 3: Tolerate standing for 10 minutes for self care and home management tasks         06/02/19 1113 06/02/19 1635   OT Individual Minutes   Time In 0841 1227   Time Out 0931 1300   Minutes 50 33     Electronically signed by DENISSE Wu on 6/2/19 at 4:44 PM

## 2019-06-02 NOTE — PROGRESS NOTES
Physical Therapy  [address] [facility]   Acute Rehabilitation Physical Therapy Progress Note    Date: 19  Patient Name: Brayden Cobian       Room: [facility]/7517-28  MRN: [id number]   Account: [de-identified]   : 1935  (80 y.o.) Gender: female     Referring Practitioner: Robert Dodson / Alexis Law / Nesha Flores   (rehab)       Dr. Flaquito Oleary (ortho)   Diagnosis: S/P R hip hemiarthroplasty 19  Past Medical History:  has a past medical history of Allergic rhinitis, Anemia, Anxiety, Asthma, Cataract, COPD (chronic obstructive pulmonary disease) ([address] CTS (carpal tunnel syndrome), Esophageal reflux, Headache(784.0), Hyperlipidemia, Hypertension, Hypothyroidism, Osteoarthritis, Osteoporosis, and Rheumatoid arthritis(714.0). Past Surgical History:   has a past surgical history that includes Rotator cuff repair (Left); Tonsillectomy and adenoidectomy; Hysterectomy, vaginal; Foot surgery; Hemorrhoid surgery; Dilation and curettage of uterus; Cataract removal with implant (Bilateral); Inguinal hernia repair; Cystocele repair; joint replacement (Left); other surgical history; Hammer toe surgery; other surgical history; and HEMIARTHROPLASTY HIP (Right, 2019). Additional Pertinent Hx: RA     Restrictions/Precautions  Restrictions/Precautions: Fall Risk;Weight Bearing  Required Braces or Orthoses?: No  Implants present? : (R hip hemiarthroplasty 19; L MARYANN)  Lower Extremity Weight Bearing Restrictions  Right Lower Extremity Weight Bearing: Weight Bearing As Tolerated  Position Activity Restriction  Hip Precautions: No hip flexion > 90 degrees; No hip extension; No hip external rotation; No ADduction(No hip *hyper*extension, no ADD past midline)  Other position/activity restrictions: Posterolateral approach - no hip flexion greater than 90 degrees, no hip adduction across midline, no hip hyperextension, and no external rotation.     Subjective: Pt focused on bowel, bladder issues today; states damp weather is causing Exercise Program, Safety Education & Training, Patient/Caregiver Education & Training, Wheelchair Mobility Training, Equipment Evaluation, Education, & procurement    Conditions Requiring Skilled Therapeutic Intervention  Body structures, Functions, Activity limitations: Decreased functional mobility ; Decreased ADL status; Decreased ROM; Decreased strength  REQUIRES PT FOLLOW UP: Yes  Discharge Recommendations: Home with assist PRN;Home with Home health PT    Goals  Short term goals  Time Frame for Short term goals: by 1 week  Short term goal 1: supervision bed mobility  Short term goal 2: transfers Min A from multiple surfaces  Short term goal 3: Ambulate 30 ft with RW in < 3 min   Short term goal 4: Amb up/down 5 stairs el HR mod A x 1  Short term goal 5: min A WC mobility 25 ft using BLE.   Long term goals  Time Frame for Long term goals : by discharge  Long term goal 1: Bed mobility Mod I  Long term goal 2: Transfers Mod I multiple surfaces  Long term goal 3: Ambulate 50-60 ft MI with RW  Long term goal 4: Amb up/down 5 steps el HR min A x 1  Long term goal 5: pt will be indep with wc mobility 50 ft     06/02/19 1026 06/02/19 1305   PT Individual Minutes   Time In 1008 1304   Time Out 1045 1405   Minutes 37 61     Electronically signed by Martha Madera PTA on 6/2/19 at 3:17 PM

## 2019-06-02 NOTE — PLAN OF CARE
Problem: Pain:  Description  Pain management should include both nonpharmacologic and pharmacologic interventions. Goal: Pain level will decrease  Description  Pain level will decrease  6/2/2019 0503 by Bernadette Green RN  Outcome: Ongoing     Problem: Risk for Impaired Skin Integrity  Goal: Tissue integrity - skin and mucous membranes  Description  Structural intactness and normal physiological function of skin and  mucous membranes.   6/2/2019 0503 by Bernadette Green RN  Outcome: Ongoing     Problem: Falls - Risk of:  Goal: Will remain free from falls  Description  Will remain free from falls  6/2/2019 0503 by Bernadette Green RN  Outcome: Ongoing

## 2019-06-02 NOTE — PROGRESS NOTES
[place] [address] Internal Medicine    Progress Note    6/2/2019    4:57 PM    Name:   Marla Soares  MRN:     [id number]     [place]:      [de-identified]   Room:   [address] [place] [address] Day:  12  Admit Date:  5/21/2019  6:04 PM    PCP:   Kyle Quesada MD  Code Status:  Full Code    Subjective:     C/C: No chief complaint on file. Interval History Status: improved. HPI:   Patient has history of rheumatoid arthritis, chronically on steroids, osteoporosis. Patient had right hip fracture, after she sustained a fall.  status post surgery, doing better    Review of Systems:     Constitutional:  negative for chills, fevers, sweats  Respiratory:  negative for cough, dyspnea on exertion, hemoptysis, shortness of breath, wheezing  Cardiovascular:  negative for chest pain, chest pressure/discomfort, lower extremity edema, palpitations  Gastrointestinal:  negative for abdominal pain, constipation, diarrhea, nausea, vomiting  Neurological:  negative for dizziness, headache  Extremity - Pain in Right Hip area   Medications: Allergies:     Allergies   Allergen Reactions    Methotrexate Derivatives Other (See Comments)     unknown    No Known Allergies     Seasonal Other (See Comments)     Congestion       Current Meds:   Scheduled Meds:    vitamin D  50,000 Units Oral Weekly    sennosides-docusate sodium  1 tablet Oral BID    lidocaine  1 patch Transdermal Daily    rivaroxaban  10 mg Oral Daily    ipratropium-albuterol  1 ampule Inhalation Q4H WA    levothyroxine  50 mcg Oral Daily    lidocaine  1 patch Transdermal Daily    losartan  25 mg Oral Daily    metoprolol tartrate  50 mg Oral BID    pantoprazole  40 mg Oral QAM AC    polyethylene glycol  17 g Oral Daily    predniSONE  10 mg Oral Daily     Continuous Infusions:   PRN Meds: furosemide, LORazepam, oxyCODONE-acetaminophen, aluminum & magnesium hydroxide-simethicone, bisacodyl, magnesium hydroxide, acetaminophen    Data:

## 2019-06-02 NOTE — DISCHARGE SUMMARY
[address] [facility]                 [address] [facility] [address]                               DISCHARGE SUMMARY    PATIENT NAME: Priscila Burgos                     :        1935  MED REC NO:   [id number]                              ROOM:       2048  ACCOUNT NO:   [de-identified]                           ADMIT DATE: 2019  PROVIDER:     Precious Bueno                         [facility] DATE: 2019    Please refer to coding summary for principal diagnoses, comorbidities,  complications, operations, and procedures. Briefly, the patient was admitted to hospital with a right displaced  femoral neck fracture. The patient underwent right hip hemiarthroplasty. Postoperatively, the patient with uneventful hospital course. On the  day of discharge, the patient is ready for discharge to rehab. Please refer to discharge orders. Posthospital followup is scheduled. Complications arise during hospitalization, none noted.         Claudean Noel    D: 2019 9:51:53       T: 2019 9:53:45     [email or web address]  Job#: [id number]     Doc#: [id number]    CC:

## 2019-06-02 NOTE — PLAN OF CARE
Problem: Pain:  Description  Pain management should include both nonpharmacologic and pharmacologic interventions. Goal: Pain level will decrease  Description  Pain level will decrease  6/2/2019 1325 by Santosh Crandall RN  Outcome: Ongoing  Note:   Po med for hip pain and arthritis pain with decrease in pain  6/2/2019 0503 by Sadie Alvarado RN  Outcome: Ongoing  Goal: Control of acute pain  Description  Control of acute pain  6/2/2019 1325 by Santosh Crandall RN  Outcome: Ongoing  6/2/2019 0503 by Sadie Alvarado RN  Outcome: Ongoing  Goal: Control of chronic pain  Description  Control of chronic pain  6/2/2019 1325 by Santosh Crandall RN  Outcome: Ongoing  6/2/2019 0503 by Sadie Alvarado RN  Outcome: Ongoing     Problem: Risk for Impaired Skin Integrity  Goal: Tissue integrity - skin and mucous membranes  Description  Structural intactness and normal physiological function of skin and  mucous membranes. 6/2/2019 1325 by Santosh Crandall RN  Outcome: Ongoing  Note:   Incision to right hip intact and well approximated with small amt of serous drainage.  No other skin issues  6/2/2019 0503 by Sadie Alvarado RN  Outcome: Ongoing     Problem: Falls - Risk of:  Goal: Will remain free from falls  Description  Will remain free from falls  6/2/2019 1325 by Santosh Crandall RN  Outcome: Ongoing  6/2/2019 0503 by Sadie Alvarado RN  Outcome: Ongoing  Goal: Absence of physical injury  Description  Absence of physical injury  6/2/2019 1325 by Santosh Crandall RN  Outcome: Ongoing  6/2/2019 0503 by Sadie Alvarado RN  Outcome: Ongoing     Problem: Nutrition  Goal: Optimal nutrition therapy  6/2/2019 1325 by Santosh Crandall RN  Outcome: Ongoing  6/2/2019 0503 by Sadie Alvarado RN  Outcome: Ongoing

## 2019-06-03 PROBLEM — I50.32 CHRONIC DIASTOLIC CHF (CONGESTIVE HEART FAILURE), NYHA CLASS 1 (HCC): Status: ACTIVE | Noted: 2019-06-03

## 2019-06-03 PROCEDURE — 99232 SBSQ HOSP IP/OBS MODERATE 35: CPT | Performed by: PHYSICAL MEDICINE & REHABILITATION

## 2019-06-03 PROCEDURE — 97116 GAIT TRAINING THERAPY: CPT

## 2019-06-03 PROCEDURE — 6370000000 HC RX 637 (ALT 250 FOR IP): Performed by: ORTHOPAEDIC SURGERY

## 2019-06-03 PROCEDURE — 94640 AIRWAY INHALATION TREATMENT: CPT

## 2019-06-03 PROCEDURE — 6370000000 HC RX 637 (ALT 250 FOR IP): Performed by: PHYSICAL MEDICINE & REHABILITATION

## 2019-06-03 PROCEDURE — 97530 THERAPEUTIC ACTIVITIES: CPT

## 2019-06-03 PROCEDURE — 99231 SBSQ HOSP IP/OBS SF/LOW 25: CPT | Performed by: INTERNAL MEDICINE

## 2019-06-03 PROCEDURE — 97542 WHEELCHAIR MNGMENT TRAINING: CPT

## 2019-06-03 PROCEDURE — 97110 THERAPEUTIC EXERCISES: CPT

## 2019-06-03 PROCEDURE — 97535 SELF CARE MNGMENT TRAINING: CPT

## 2019-06-03 PROCEDURE — 1180000000 HC REHAB R&B

## 2019-06-03 RX ORDER — MAGNESIUM HYDROXIDE/ALUMINUM HYDROXICE/SIMETHICONE 120; 1200; 1200 MG/30ML; MG/30ML; MG/30ML
30 SUSPENSION ORAL EVERY 6 HOURS PRN
Qty: 1 BOTTLE | Refills: 0 | Status: CANCELLED | OUTPATIENT
Start: 2019-06-03

## 2019-06-03 RX ADMIN — PANTOPRAZOLE SODIUM 40 MG: 40 TABLET, DELAYED RELEASE ORAL at 05:57

## 2019-06-03 RX ADMIN — IPRATROPIUM BROMIDE AND ALBUTEROL SULFATE 1 AMPULE: .5; 3 SOLUTION RESPIRATORY (INHALATION) at 08:11

## 2019-06-03 RX ADMIN — OXYCODONE HYDROCHLORIDE AND ACETAMINOPHEN 1 TABLET: 5; 325 TABLET ORAL at 09:03

## 2019-06-03 RX ADMIN — OXYCODONE HYDROCHLORIDE AND ACETAMINOPHEN 1 TABLET: 5; 325 TABLET ORAL at 03:26

## 2019-06-03 RX ADMIN — LORAZEPAM 0.5 MG: 0.5 TABLET ORAL at 21:05

## 2019-06-03 RX ADMIN — IPRATROPIUM BROMIDE AND ALBUTEROL SULFATE 1 AMPULE: .5; 3 SOLUTION RESPIRATORY (INHALATION) at 19:18

## 2019-06-03 RX ADMIN — POLYETHYLENE GLYCOL 3350 17 G: 17 POWDER, FOR SOLUTION ORAL at 21:06

## 2019-06-03 RX ADMIN — IPRATROPIUM BROMIDE AND ALBUTEROL SULFATE 1 AMPULE: .5; 3 SOLUTION RESPIRATORY (INHALATION) at 12:42

## 2019-06-03 RX ADMIN — PREDNISONE 10 MG: 10 TABLET ORAL at 08:56

## 2019-06-03 RX ADMIN — METOPROLOL TARTRATE 50 MG: 50 TABLET ORAL at 08:57

## 2019-06-03 RX ADMIN — OXYCODONE HYDROCHLORIDE AND ACETAMINOPHEN 1 TABLET: 5; 325 TABLET ORAL at 21:05

## 2019-06-03 RX ADMIN — OXYCODONE HYDROCHLORIDE AND ACETAMINOPHEN 1 TABLET: 5; 325 TABLET ORAL at 15:13

## 2019-06-03 RX ADMIN — IPRATROPIUM BROMIDE AND ALBUTEROL SULFATE 1 AMPULE: .5; 3 SOLUTION RESPIRATORY (INHALATION) at 16:01

## 2019-06-03 RX ADMIN — ACETAMINOPHEN 650 MG: 325 TABLET, FILM COATED ORAL at 10:32

## 2019-06-03 RX ADMIN — LOSARTAN POTASSIUM 25 MG: 25 TABLET ORAL at 08:56

## 2019-06-03 RX ADMIN — METOPROLOL TARTRATE 50 MG: 50 TABLET ORAL at 21:06

## 2019-06-03 RX ADMIN — SENNOSIDES,DOCUSATE SODIUM 1 TABLET: 8.6; 5 TABLET, FILM COATED ORAL at 21:05

## 2019-06-03 RX ADMIN — LEVOTHYROXINE SODIUM 50 MCG: 50 TABLET ORAL at 05:57

## 2019-06-03 RX ADMIN — RIVAROXABAN 10 MG: 10 TABLET, FILM COATED ORAL at 17:26

## 2019-06-03 ASSESSMENT — PAIN DESCRIPTION - DESCRIPTORS
DESCRIPTORS: SORE
DESCRIPTORS: ACHING;SORE;TIGHTNESS

## 2019-06-03 ASSESSMENT — PAIN DESCRIPTION - LOCATION: LOCATION: KNEE

## 2019-06-03 ASSESSMENT — PAIN SCALES - GENERAL
PAINLEVEL_OUTOF10: 0
PAINLEVEL_OUTOF10: 0
PAINLEVEL_OUTOF10: 10
PAINLEVEL_OUTOF10: 8
PAINLEVEL_OUTOF10: 6
PAINLEVEL_OUTOF10: 4
PAINLEVEL_OUTOF10: 5
PAINLEVEL_OUTOF10: 6
PAINLEVEL_OUTOF10: 5
PAINLEVEL_OUTOF10: 7
PAINLEVEL_OUTOF10: 6

## 2019-06-03 ASSESSMENT — PAIN - FUNCTIONAL ASSESSMENT: PAIN_FUNCTIONAL_ASSESSMENT: ACTIVITIES ARE NOT PREVENTED

## 2019-06-03 ASSESSMENT — PAIN DESCRIPTION - PROGRESSION: CLINICAL_PROGRESSION: NOT CHANGED

## 2019-06-03 ASSESSMENT — PAIN DESCRIPTION - FREQUENCY: FREQUENCY: CONTINUOUS

## 2019-06-03 ASSESSMENT — PAIN DESCRIPTION - ONSET: ONSET: ON-GOING

## 2019-06-03 ASSESSMENT — PAIN DESCRIPTION - ORIENTATION: ORIENTATION: RIGHT;LEFT

## 2019-06-03 ASSESSMENT — PAIN DESCRIPTION - PAIN TYPE
TYPE: CHRONIC PAIN
TYPE: CHRONIC PAIN
TYPE: ACUTE PAIN;CHRONIC PAIN

## 2019-06-03 ASSESSMENT — PAIN SCALES - WONG BAKER: WONGBAKER_NUMERICALRESPONSE: 2

## 2019-06-03 NOTE — PROGRESS NOTES
ecchymosis, healing. · GI-oral mucosa moist,  · Lymphatic system-no lymphadenopathy no splenomegaly  · Mouth- mucous membrane moist  · Head-normocephalic atraumatic  · Neck- supple no carotid bruit,Thyroid not palpable. Laboratory Testing:  CBC:   Recent Labs     06/01/19  0609   HGB 9.4*     BMP:  No results for input(s): NA, K, CL, CO2, BUN, CREATININE, GLUCOSE in the last 72 hours. S. Calcium:No results for input(s): CALCIUM in the last 72 hours. S. Ionized Calcium:No results for input(s): IONCA in the last 72 hours. S. Magnesium:No results for input(s): MG in the last 72 hours. S. Phosphorus:No results for input(s): PHOS in the last 72 hours. S. Glucose:  Recent Labs     06/01/19  0704 06/01/19  1555 06/01/19  1938   POCGLU 100 176* 145*     Glycosylated hemoglobin A1C: No results for input(s): LABA1C in the last 72 hours. INR: No results for input(s): INR in the last 72 hours. Hepatic functions: No results for input(s): ALKPHOS, ALT, AST, PROT, BILITOT, BILIDIR, LABALBU in the last 72 hours. Pancreatic functions:No results for input(s): LACTA, AMYLASE in the last 72 hours. S. Lactic Acid: No results for input(s): LACTA in the last 72 hours. Cardiac enzymes:No results for input(s): CKTOTAL, CKMB, CKMBINDEX, TROPONINI in the last 72 hours. BNP:No results for input(s): BNP in the last 72 hours. Lipid profile: No results for input(s): CHOL, TRIG, HDL, LDLCALC in the last 72 hours.     Invalid input(s): LDL  Blood Gases: No results found for: PH, PCO2, PO2, HCO3, O2SAT  Thyroid functions:   Lab Results   Component Value Date    TSH 3.84 06/18/2018        Imaging/Diagonstics:  Xr Hip Right (1 View)    Result Date: 5/17/2019  EXAMINATION: ONE XRAY VIEW OF THE RIGHT HIP 5/17/2019 8:22 am COMPARISON: 05/16/2019 HISTORY: Ordering Physician Provided Reason for Exam: x-table for total right hip in O.R. Acuity: Acute Type of Exam: Initial FINDINGS: Status post right total hip arthroplasty with noncemented for a hip fracture. FINDINGS: Bibasilar atelectasis. No focal airspace consolidation. No pleural effusion or pneumothorax. Heart size and mediastinal contours are stable. The pulmonary vascularity is normal.     Bibasilar atelectasis. Otherwise, no acute cardiopulmonary abnormality. Xr Hip 2-3 Vw W Pelvis Right    Result Date: 5/16/2019  EXAMINATION: ONE XRAY VIEW OF THE PELVIS AND TWO XRAY VIEWS RIGHT HIP 5/16/2019 2:02 am COMPARISON: 10/09/2015 HISTORY: ORDERING SYSTEM PROVIDED HISTORY: trauma TECHNOLOGIST PROVIDED HISTORY: trauma Ordering Physician Provided Reason for Exam: Pt c/o right hip pain s/p fall 2 hours ago. Acuity: Acute Type of Exam: Initial Mechanism of Injury: Pt c/o right hip pain s/p fall 2 hours ago. FINDINGS: Subcapital right proximal femur fracture with about 2 cm of proximal migration of distal fracture fragment. Pelvis appears intact. Status post left total hip replacement. Subcapital right femur fracture as described. ASSESSMENT:    Patient Active Problem List   Diagnosis    Hypothyroidism    Osteoporosis    Esophageal reflux    Cataract    Hyperlipidemia    Osteoarthritis    Anemia    Allergic rhinitis    Asthma    Rheumatoid arthritis ([address]    Methotrexate lung    Benign essential HTN    Cushing syndrome ([address]    Closed fracture of right hip (HCC)    Closed fracture of neck of right femur with routine healing    History of hemiarthroplasty of right hip    S/P right hip fracture    Bilateral leg edema    Chronic diastolic CHF (congestive heart failure), NYHA class 1 (HCC)     Active Problems:    Osteoporosis    Osteoarthritis    Rheumatoid arthritis (HCC)    Methotrexate lung    History of hemiarthroplasty of right hip    S/P right hip fracture    Bilateral leg edema    Chronic diastolic CHF (congestive heart failure), NYHA class 1 (HCC)  Resolved Problems:    * No resolved hospital problems. *      PLAN:    1.  Right hip fracture, S/P R hip

## 2019-06-03 NOTE — PROGRESS NOTES
Nutrition Assessment    Type and Reason for Visit: Reassess    Nutrition Recommendations: Continue current diet. Nutrition Assessment: Pt remains stable from a nutritional viewpoint. PO intake is adequate. Will continue current plan of care. Malnutrition Assessment:  · Malnutrition Status: No malnutrition    Nutrition Risk Level: Moderate    Nutrient Needs:  · Estimated Daily Total Kcal: 1475 kcal based on 25 kcal/kg admission wt  · Estimated Daily Protein (g): 65-71 gm pro based on 1.1-1.2 gm pro/kg IBW    Nutrition Diagnosis:   · Problem: Increased nutrient needs  · Etiology: related to Other (Comment)(Hip repair)     Signs and symptoms:  as evidenced by Other (Comment)(increased needs for healing)    Objective Information:  · Nutrition-Focused Physical Findings: Edema: +1 RLE, LLE. · Wound Type: Surgical Wound  · Current Nutrition Therapies:  · Oral Diet Orders: General   · Oral Diet intake: %  · Anthropometric Measures:  · Ht: 4' 11\" (149.9 cm)   · Current Body Wt: 130 lb (59 kg)  · Admission Body Wt: 130 lb (59 kg)  · Ideal Body Wt: 105 lb (47.6 kg)(used high end of range), % Ideal Body 124%  · BMI Classification: BMI 25.0 - 29.9 Overweight    Nutrition Interventions:   Continue current diet  Continued Inpatient Monitoring    Nutrition Evaluation:   · Evaluation: Progressing toward goals   · Goals: PO intake % of meals    · Monitoring: Meal Intake, Diet Tolerance, I&O, Weight, Pertinent Labs, Monitor Bowel Function    Jonny Rodríguez R.D., L.D.   Phone: [phone]

## 2019-06-03 NOTE — PROGRESS NOTES
[address]   ACUTE REHABILITATION OCCUPATIONAL THERAPY  DAILY NOTE    Date: 6/3/19  Patient Name: Fredrick Simon      Room: 5579/7715-53    MRN: [id number]   : 1935  (80 y.o.)  Gender: female   Referring Practitioner: Robert Conklin / Torres Carias / Merlyn Elizabeth   (rehab)       Dr. 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 : (R hip hemiarthroplasty 19; L MARYANN)  Hip Precautions: No hip flexion > 90 degrees, No hip extension, No hip external rotation, No ADduction(No hip *hyper*extension, no ADD past midline)  Other position/activity restrictions: Posterolateral approach - no hip flexion greater than 90 degrees, no hip adduction across midline, no hip hyperextension, and no external rotation. Right Lower Extremity Weight Bearing: Weight Bearing As Tolerated  Required Braces or Orthoses?: No    Subjective  Subjective: Pt reports good comfort for home with all ADL functional tasks. Comments: Pt pleasant and cooperative. Pt c increased ability to stay on track for funtional tasks this date. Patient Currently in Pain: Yes  Pain Level: 6  Pain Location: Hip;Back;Groin;Knee  Restrictions/Precautions: Fall Risk;Weight Bearing        Pain Assessment  Pain Assessment: 0-10  Pain Level: 6  Pain Type: Acute pain, Chronic pain  Pain Location: Hip, Back, Groin, Knee  Pain Orientation: Right  Pain Descriptors: Aching    Objective  Cognition  Overall Cognitive Status: WFL  Perception  Overall Perceptual Status: WFL  Balance  Sitting Balance: Independent  Standing Balance: Modified independent   Transfers  Sit to stand: Modified independent  Stand to sit: Modified independent     Functional Mobility  Functional - Mobility Device: Rolling Walker  Activity: To/from bathroom;Transport items; Retrieve items     Type of ROM/Therapeutic Exercise  Type of ROM/Therapeutic Exercise: AROM(green theraband. )  Comment: writer educ pt on UB HEP to address overall strength and endurance c functional tasks. educ pt on ex form and technique to grade resistance. x15-20 reps completed. Handout provided. Pt verbalized enjoyment of ex's and G motivation to complete at home. Exercises  Shoulder Flexion: x  Shoulder Extension: x  Shoulder ABduction: x  Shoulder ADduction: x  Horizontal ABduction: x  Horizontal ADduction: x  Elbow Flexion: x  Elbow Extension: x     OT FIM:   Eatin - Feeds self with adaptive equipment/dentures and/or feeds self with modified diet and/or performs own tube feeding  Groomin - Independent with all tasks using assistive device  Bathin - Able to bathe all 10 areas with device  Dressing-Upper: 6 - Independent with device/prosthesis  Dressing-Lower: 6 - Independent with device/prosthesis  Toiletin - Requires device (grab bar/walker/etc.)  Toilet Transfer: 6 - Independent with device (grab bar/walker/slide bar)  Primary Mode: Shower  Tub Transfer: 0 - Activity does not occur  Shower Transfer: 0 - Activity does not occur    Social Interaction: 5 - Patient is appropriate with supervision/cues  Problem Solvin - Patient able to solve simple/routine tasks  Memory: 5 - Patient requires prompting with stress/unfamiliar situations    Assessment  Performance deficits / Impairments: Decreased functional mobility ; Decreased ADL status; Decreased ROM; Decreased strength;Decreased safe awareness;Decreased cognition;Decreased endurance;Decreased balance;Decreased high-level IADLs;Decreased coordination  Assessment: Pt and spouse verbalize good motivation to purchase and utilize AE at home. Prognosis: Good  Discharge Recommendations: Home with assist PRN;Home with Home health OT; Home with nursing aide  Activity Tolerance: Patient Tolerated treatment well  Safety Devices in place: Yes  Type of devices: Call light within reach;Nurse notified; Left in bed(pt seated EOB, spouse at bedside)  Equipment Recommendations  Equipment Needed: Yes  Reacher: for LBD  Sock Aid: X  Other: Long Handled Sponge     Patient Education:  Patient Goals   Patient goals : To Return Home able to care for herself   Patient Education: reinforced performance day goals, HEP, places to purchase AE for independence at home. Learner:patient and significant other  Method: demonstration, explanation and handout       Outcome: acknowledged understanding of , demonstrated understanding and asked questions     Plan  Plan  Times per week: 900/7   Times per day: Twice a day  Current Treatment Recommendations: Self-Care / ADL, Balance Training, Functional Mobility Training, Endurance Training, Safety Education & Training, Patient/Caregiver Education & Training, Positioning, Strengthening, ROM, Cognitive Reorientation, Home Management Training  Plan Comment: continue OT  Patient Goals   Patient goals : To Return Home able to care for herself   Short term goals  Time Frame for Short term goals: 1 week   Short term goal 1: Pt will V/D Good understanding of AE/DME/modified techniques for increased IND with self-care and mobility. Short term goal 2: Pt will actively participate in 10+ minutes of self-care to the best of Pt's ability to promote increased IND with self-care. Short term goal 3: Pt will perform bed mobility with Min A to sit EOB for increased participation with self-care. Short term goal 4: Pt will perform sit to stand transfer with Min A with RW with Good safety awareness. Short term goal 5: Pt will actively participate in 10-15 minutes in therapeutic exercise/functional activities to promote increased IND with self-care and mobility.   Long term goals  Time Frame for Long term goals : By Discharge   Long term goal 1: D/V understanding of Joint protection strategies with application to care needs   Long term goal 2: Mod I for basic premorbid self care tasks Long term goal 3: Tolerate standing for 10 minutes for self care and home management tasks         06/03/19 1047 06/03/19 1522   OT Individual Minutes   Time In 0945 1409   Time Out [id number]   Minutes 33 25     Electronically signed by RAI Romeo on 6/3/19 at 3:30 PM

## 2019-06-03 NOTE — PROGRESS NOTES
Physical Medicine & Rehabilitation  Progress Note      Subjective:      80year-old female with R hip fracture s/p hemiarthroplasty    Patient is well, and has had no acute complaints or problems. She has c/o mild aches associated with her arthritis which is at baseline for her. ROS:  Denies fevers, chills, sweats. No chest pain, palpitations, lightheadedness. Denies coughing, wheezing or shortness of breath. Denies abdominal pain, nausea, diarrhea or constipation. No new areas of joint pain. Denies new areas of numbness or weakness. Denies new anxiety or depression issues. No new skin problems. Rehabilitation:   Progressing in therapies. PT:  Restrictions/Precautions: Fall Risk, Weight Bearing  Implants present? : (R hip hemiarthroplasty 5/17/19; L MARYANN)  Hip Precautions: No hip flexion > 90 degrees, No hip extension, No hip external rotation, No ADduction(No hip *hyper*extension, no ADD past midline)  Other position/activity restrictions: Posterolateral approach - no hip flexion greater than 90 degrees, no hip adduction across midline, no hip hyperextension, and no external rotation. Right Lower Extremity Weight Bearing: Weight Bearing As Tolerated   Transfers  Sit to Stand: Supervision, Modified independent  Stand to sit: Supervision, Modified independent  Bed to Chair: Stand by assistance  Comment: RW. Good hand placement demo'd today throughout. WB Status: WBAT R  Ambulation 1  Surface: level tile  Device: Rolling Walker  Assistance: Modified Independent  Quality of Gait: slow, steady pace; hip and knee flexion throughout; step-through pattern. Kyphotic; some reduction of flexion throughout as pt verbalizes need for better posture. Gait Deviations: Slow Diane  Distance: 200'  Comments: Pt requests repeated walks (3x) down santos, appears to be venting, but also engaging in positive self-talk, encouraging larger steps, better posture with good result.     Transfers  Sit to Stand: Supervision, intact to light touch. MSK: Functional ROM BUE and LLEs. Weakness limits ROM RLE.  Strength 4+/5 key muscles BUE and LLE. R hip flexion 4-/5, R knee extension 4/5. EXTREMITIES: No calf tenderness to palpation bilaterally. No edema BLEs  SKIN: warm dry and intact with good turgor. R lateral hip incision well approximated with staples intact, mild serous drainage. PSYCH: appropriately interactive. Affect WNL. Diagnostics:     CBC:   Recent Labs     06/01/19  0609   HGB 9.4*   HCT 29.1*     BMP: No results for input(s): NA, K, CL, CO2, PHOS, BUN, CREATININE, GLUCOSE in the last 72 hours. Invalid input(s): CA  BNP: No results for input(s): BNP in the last 72 hours. PT/INR: No results for input(s): PROTIME, INR in the last 72 hours. APTT: No results for input(s): APTT in the last 72 hours. CARDIAC ENZYMES: No results for input(s): CKMB, CKMBINDEX, TROPONINT in the last 72 hours. Invalid input(s): CKTOTAL;3  FASTING LIPID PANEL:No results found for: CHOL, HDL, TRIG  LIVER PROFILE: No results for input(s): AST, ALT, ALB, BILIDIR, BILITOT, ALKPHOS in the last 72 hours.      Current Medications:   Current Facility-Administered Medications: furosemide (LASIX) tablet 20 mg, 20 mg, Oral, Daily PRN  LORazepam (ATIVAN) tablet 0.5 mg, 0.5 mg, Oral, Q6H PRN  oxyCODONE-acetaminophen (PERCOCET) 5-325 MG per tablet 1 tablet, 1 tablet, Oral, Q6H PRN  vitamin D (ERGOCALCIFEROL) capsule 50,000 Units, 50,000 Units, Oral, Weekly  aluminum & magnesium hydroxide-simethicone (MAALOX) 200-200-20 MG/5ML suspension 30 mL, 30 mL, Oral, Q6H PRN  sennosides-docusate sodium (SENOKOT-S) 8.6-50 MG tablet 1 tablet, 1 tablet, Oral, BID  bisacodyl (DULCOLAX) suppository 10 mg, 10 mg, Rectal, Daily PRN  magnesium hydroxide (MILK OF MAGNESIA) 400 MG/5ML suspension 30 mL, 30 mL, Oral, Daily PRN  lidocaine 4 % external patch 1 patch, 1 patch, Transdermal, Daily  acetaminophen (TYLENOL) tablet 650 mg, 650 mg, Oral, Q4H PRN  rivaroxaban (XARELTO) tablet 10 mg, 10 mg, Oral, Daily  ipratropium-albuterol (DUONEB) nebulizer solution 1 ampule, 1 ampule, Inhalation, Q4H WA  levothyroxine (SYNTHROID) tablet 50 mcg, 50 mcg, Oral, Daily  lidocaine 4 % external patch 1 patch, 1 patch, Transdermal, Daily  losartan (COZAAR) tablet 25 mg, 25 mg, Oral, Daily  metoprolol tartrate (LOPRESSOR) tablet 50 mg, 50 mg, Oral, BID  pantoprazole (PROTONIX) tablet 40 mg, 40 mg, Oral, QAM AC  polyethylene glycol (GLYCOLAX) packet 17 g, 17 g, Oral, Daily  predniSONE (DELTASONE) tablet 10 mg, 10 mg, Oral, Daily      Impression/Plan:   Impaired ADLs, gait, and mobility due to:      1. R hip fracture s/p hemiarthroplasty: PT/OT for gait, mobility, strengthening, ADLs, and self care. Percocet and Lidoderm patch as ordered for pain (lidoderm on B knees). 2. RA: on prednisone. Treats with Dr. Kendall Cortez. Developed pulmonary complications on methotrexate. 3. Post-op anemia: Hb 9.4 on 6/1. Monitoring H&H  4. Hyperglycemia: on chronic prednisone, will monitor  5. Anxiety: on ativan, tapering dose as tolerated  6. HTN: on cozaar and Lopressor  7. Hypothyroidism: on synthroid  8. Hypokalemia: on KCl repletion and Lasix changed to prn at family request  9. COPD: on Duonebs  10. GERD/Bowel management: on Protonix, Miralax and Senna S as ordered  11. DVT prophylaxis:  Xarelto  12. Internal medicine for medical management  13. Anticipate discharge home tomorrow. Patient should follow up with Dr. Ricky Schwartz as scheduled, and PCP in 1-2 weeks. Electronically signed by Khanh Azul MD on 6/3/2019 at 9:22 AM      This note is created with the assistance of a speech recognition program.  While intending to generate a document that actually reflects the content of the visit, the document can still have some errors including those of syntax and sound a like substitutions which may escape proof reading. In such instances, actual meaning can be extrapolated by contextual diversion.

## 2019-06-03 NOTE — PATIENT CARE CONFERENCE
[address]   ACUTE REHABILITATION  TEAM CONFERENCE NOTE  Date: 6/3/19  Patient Name: Hugh Velez       Room: 9988/0957-12  MRN: [id number]       : 1935  (80 y.o.)     Gender: female   Referring Practitioner: MEGHAN Woods  Diagnosis: S/P R hip hemiarthroplasty 19    NURSING  FIMS:  Bladder: 6 - Uses device independently (including EMPTYING of device, or uses medication)  Bladder Level of Assistance: 6- Requires bedpan, urinal, diaper, catheter but patient obtains and empties own device. Or requires bladder management medication  Bladder Frequency of Accidents: 6 - No accidents,uses device like urinal, bedpan, absorbant pad, or requires medication to manage bladder  Bowel: 6 - Uses toilet independently with device or oral medication(s)  Bowel Level of Assistance: 6- Requires device like bedpan, diaper, bedside commode, but patient obtains and empties own device including colostomy. Or requires bowel management medication including stool softeners, laxatives, inserts own suppository  Bowel Frequency of Accidents: 6 - No accidents, uses device like bedpan, diaper, bedside commode colostomy, or requires medication to manage bowels   Bladder  Continent  Bowel   Continent  Intervention    Both Bowel & Bladder Program     Wounds/Incisions/Ulcers: Incision healing well  Medication Education Program: Patient able to manage medications and being educated by nursing  Pain: Patient's pain is currently controlled with -  Percocet 5 mg q 6 hrs prn, Tylenol prn, and ice prn    Fall Risk:  Falling star program initiated    PHYSICAL THERAPY         Transfers:  Sit to Stand: Modified independent  Stand to sit: Modified independent  Stand Pivot Transfers: Modified independent    WB Status: WBAT R  Ambulation 1  Surface: level tile  Device: Rolling Walker  Assistance: Modified Independent  Quality of Gait: slow, steady pace; hip and knee flexion throughout; step-through pattern.  Kyphotic; some reduction of flexion throughout as pt verbalizes need for better posture. Gait Deviations: Slow Diane  Distance: 200'  Ambulation 2  Surface - 2: carpet  Device 2: Rolling Walker  Assistance 2: Supervision  Quality of Gait 2: slow, steady pace; hip and knee flexion throughout; step-through pattern. No LOB. Distance: 100'  Comments: Community outing to gift shop with recreational therapist Dunia Frazier. Stairs  # Steps : 9  Stairs Height: 8\"(stairwell)  Rails: Bilateral  Device: No Device  Assistance: Stand by assistance  Comment: Step-to pattern. Pt verbalizes and adopts proper sequencing today, able to continue throughout (repeating self-cues) without as many breaks in up, down as on gym steps. FIMS:  Bed, Chair, Wheel Chair: 6 - Requires assistive device (slide rail)  Walk: 6 - Modified [place]  Walks at least 150 feet with an ambulatory device, orthosis or prosthesis OR requires extra amount of time OR there is concern for safety  Distance Walked: 200'(RW)  Wheel Chair: 5 - Supervision Requires standby supervision or cuing to operate wheelchair at least 150 feet  Stairs: 2- Maximal Assistance Performs 25-49% of the effort to go up and down 4 to 6 stairs and requires the assistance of one person only(9 8\" steps, B HRs)    PT Equipment Recommendations  Equipment Needed: Yes  Mobility Devices: Brianna Robert: Rolling(Aditya)  Other: Myron Forbes was evaluated today and a DME order was entered for a aditya wheeled walker because she requires this to successfully complete daily living tasks of personal cares and ambulating. A wheeled walker is necessary due to the patient's unsteady gait, upper body weakness, and inability to  an ambulation device; and she can ambulate only by pushing a walker instead of a lesser assistive device such as a cane, crutch, or standard walker. The need for this equipment was discussed with the patient and she understands and is in agreement.  DME order was given to JUDY Altered Self care status Training in safety and modified care strategies for self care tasks using Joint Protection strategies                                           Functional FIM Gain  Admission Score:  56  Progress:  93  Goal:  101   `  Discharge Plan   Estimated Discharge Date: 6/4/19  Overnight or Day Pass: No  Factors facilitating achievement of predicted outcomes: Family support, Motivated, Cooperative, Pleasant and Good insight into deficits  Barriers to the achievement of predicted outcomes: Anxiety and Lower extremity weakness    Functional Goals at discharge:  Home with family Modified independence  Discharge therapy goals:  PT: Long term goals  Time Frame for Long term goals : by discharge  Long term goal 1: Bed mobility Mod I  Long term goal 2: Transfers Mod I multiple surfaces  Long term goal 3: Ambulate 50-60 ft MI with RW  Long term goal 4: Amb up/down 5 steps el HR min A x 1  Long term goal 5: pt will be indep with wc mobility 50 ft  OT:Long term goals  Time Frame for Long term goals : By Discharge   Long term goal 1: D/V understanding of Joint protection strategies with application to care needs   Long term goal 2: Mod I for basic premorbid self care tasks    Long term goal 3: Tolerate standing for 10 minutes for self care and home management tasks   ST:     Team Members Present at Conference:  :  [address]  Occupational Therapist: Mica Melendez OT   [address] PT  Speech Therapist:  N/A  Nurse: Etta Parada RN   Recreation Therapy: Tiana Schaefer  Dietary/Nutrition: Jefe Abel RD LD  Pastoral Care: Edythe Schaumann  CMG:  Mine Issa RN    I approve the established interdisciplinary plan of care as documented within the medical record of Yesi Newman.     Babatunde Reyes MD

## 2019-06-03 NOTE — PROGRESS NOTES
Physical Therapy  [facility]   Acute Rehabilitation Physical Therapy Progress Note    Date: 6/3/19  Patient Name: Lucai Casas       Room: 6927/6384-92  MRN: [id number]   Account: [de-identified]   : 1935  (80 y.o.) Gender: female     Referring Practitioner: Drs Nancey Sandifer / Sher Rodriguez / Arnulfo Dillard   (rehab)       Dr. Marija Wahl (ortho)   Diagnosis: S/P R hip hemiarthroplasty 19  Past Medical History:  has a past medical history of Allergic rhinitis, Anemia, Anxiety, Asthma, Cataract, COPD (chronic obstructive pulmonary disease) ([address] CTS (carpal tunnel syndrome), Esophageal reflux, Headache(784.0), Hyperlipidemia, Hypertension, Hypothyroidism, Osteoarthritis, Osteoporosis, and Rheumatoid arthritis(714.0). Past Surgical History:   has a past surgical history that includes Rotator cuff repair (Left); Tonsillectomy and adenoidectomy; Hysterectomy, vaginal; Foot surgery; Hemorrhoid surgery; Dilation and curettage of uterus; Cataract removal with implant (Bilateral); Inguinal hernia repair; Cystocele repair; joint replacement (Left); other surgical history; Hammer toe surgery; other surgical history; and HEMIARTHROPLASTY HIP (Right, 2019). Additional Pertinent Hx: RA     Restrictions/Precautions  Restrictions/Precautions: Fall Risk;Weight Bearing  Required Braces or Orthoses?: No  Implants present? : (R hip hemiarthroplasty 19; L MARYANN)  Lower Extremity Weight Bearing Restrictions  Right Lower Extremity Weight Bearing: Weight Bearing As Tolerated  Position Activity Restriction  Hip Precautions: No hip flexion > 90 degrees; No hip extension; No hip external rotation; No ADduction(No hip *hyper*extension, no ADD past midline)  Other position/activity restrictions: Posterolateral approach - no hip flexion greater than 90 degrees, no hip adduction across midline, no hip hyperextension, and no external rotation.     Subjective: States she had trouble getting to sleep, but slept well after that; states she's eating too much in hospital.   Comments: Care coordination paperwork printed and inserted in pt care notebook, discussed with pt. Vital Signs  Patient Currently in Pain: Yes  Pain Assessment: Faces  Lester-Baker Pain Rating: Hurts a little bit  Pain Type: Chronic pain  Pain Location: Back;Knee; Coccyx  Pain Descriptors: Aching;Sore;Tightness(Stiff)  Non-Pharmaceutical Pain Intervention(s): Ambulation/Increased Activity; Rest;Repositioned  Response to Pain Intervention: Patient Satisfied     Transfers:  Sit to Stand: Modified independent  Stand to sit: Modified independent  Stand Pivot Transfers: Modified independent   Comment: RW. Good hand placement demo'd today throughout. WB Status: WBAT R  Ambulation 1  Surface: level tile  Device: Rolling Walker  Assistance: Modified Independent  Quality of Gait: slow, steady pace; hip and knee flexion throughout; step-through pattern. Kyphotic; some reduction of flexion throughout as pt verbalizes need for better posture. Gait Deviations: Slow Diane  Distance: 200'     Ambulation 2  Surface - 2: carpet  Device 2: Rolling Walker  Assistance 2: Supervision  Quality of Gait 2: slow, steady pace; hip and knee flexion throughout; step-through pattern. No LOB. Distance: 100'  Comments: Community outing to gift shop with recreational therapist Lin Mejia. Stairs/Curb  Stairs?: Yes  Stairs  # Steps : 9  Stairs Height: 8\"(stairwell)  Rails: Bilateral  Device: No Device  Assistance: Stand by assistance  Comment: Step-to pattern. Pt verbalizes and adopts proper sequencing today, able to continue throughout (repeating self-cues) without as many breaks in up, down as on gym steps. Wheelchair Activities  Propulsion: Yes  Propulsion 1  Propulsion: Manual  Level: Level Tile  Method: RUE;LUE  Level of Assistance: Supervision  Description/ Details: Pt able to maintain straight path and 180* turns, manuevering.  Prefers to turn precisely, taking several attempts to turn \"correctly. \"  Distance: 150'     FIMS:  TRANSFERS  Bed, Chair, Wheel Chair: 6 - Requires assistive device (slide rail)   LOCOMOTION  Primary Mode: Walk  Distance Walked: 200'(RW)  Walk: 6 - Modified Hegins  Walks at least 150 feet with an ambulatory device, orthosis or prosthesis OR requires extra amount of time OR there is concern for safety  Wheel Chair: 5 - Supervision Requires standby supervision or cuing to operate wheelchair at least 150 feet  Stairs: 2- Maximal Assistance Performs 25-49% of the effort to go up and down 4 to 6 stairs and requires the assistance of one person only(9 8\" steps, B HRs)     BALANCE Posture: Fair(Kyphotic, F head)  Sitting - Static: Good(EOC, no support)  Sitting - Dynamic: Fair;+(EOC, no back support)  Standing - Static: Fair;+(RW)  Standing - Dynamic: Fair(RW)  Comments: Can stand for several minutes without UE support    EXERCISES    Other exercises?: Yes  Other exercises 2: Seated BLE EX: 1.5# L LE/AROM R LE/lime (min) resistance band, 20x each(Issued blue (mod) band)  Other exercises 6: Issued HEP with education  Other exercises 10: Reviewed hip precautions     Activity Tolerance: Patient Tolerated treatment well(Rest breaks PRN)  PT Equipment Recommendations  Equipment Needed: Yes  Mobility Devices: Hormoy Oiler: Rolling(Aditya)  Other: .dme     Patient Education  New Education Provided: HEP, hip precautions  Learner:patient and significant other  Method: demonstration, explanation and handout       Outcome: acknowledged understanding of, verbalized concerns and asked questions     Current Treatment Recommendations: Strengthening, ROM, Balance Training, Transfer Training, Gait Training, Stair training, Neuromuscular Re-education, Functional Mobility Training, Home Exercise Program, Safety Education & Training, Patient/Caregiver Education & Training, Wheelchair Mobility Training, Equipment Evaluation, Education, & procurement    Conditions Requiring Skilled Therapeutic Intervention  Body structures, Functions, Activity limitations: Decreased functional mobility ; Decreased ADL status; Decreased ROM; Decreased strength  Assessment: High endurance for activity. Talkative nature can get in way of therapy. Patient Education: HEP, hip precautions  REQUIRES PT FOLLOW UP: Yes  Discharge Recommendations: Home with assist PRN;Home with Home health PT      06/03/19 1249   PT Equipment Recommendations   Equipment Needed Yes   Mobility Devices Becky Vieira  (Paz)   Shanel Birdena Late was evaluated today and a DME order was entered for a paz wheeled walker because she requires this to successfully complete daily living tasks of personal cares and ambulating. A wheeled walker is necessary due to the patient's unsteady gait, upper body weakness, and inability to  an ambulation device; and she can ambulate only by pushing a walker instead of a lesser assistive device such as a cane, crutch, or standard walker. The need for this equipment was discussed with the patient and she understands and is in agreement. DME order was given to [facility]. Goals  Short term goals  Time Frame for Short term goals: by 1 week  Short term goal 1: supervision bed mobility  Short term goal 2: transfers Min A from multiple surfaces  Short term goal 3: Ambulate 30 ft with RW in < 3 min   Short term goal 4: Amb up/down 5 stairs el HR mod A x 1  Short term goal 5: min A WC mobility 25 ft using BLE.   Long term goals  Time Frame for Long term goals : by discharge  Long term goal 1: Bed mobility Mod I  Long term goal 2: Transfers Mod I multiple surfaces  Long term goal 3: Ambulate 50-60 ft MI with RW  Long term goal 4: Amb up/down 5 steps el HR min A x 1  Long term goal 5: pt will be indep with wc mobility 50 ft     06/03/19 1129 06/03/19 1338   PT Individual Minutes   Time In 1035 1300   Time Out [id number]   Minutes 52 35     Electronically signed by Louisa Darling PTA on 6/3/19 at 3:18 PM

## 2019-06-03 NOTE — CARE COORDINATION
independent  Sit to Supine: Modified independent  Scooting: Modified independent  Comment: pt seen in chair this date     Transfers:   Sit to Stand: Supervision, Modified independent   Bed to Chair: Stand by assistance Stand Pivot Transfers: Modified independent      Posture: Fair(Kyphotic, F head)      Ambulation Status:  Assistance: Modified Independent  Quality of Gait: slow, steady pace; hip and knee flexion throughout; step-through pattern. Kyphotic; some reduction of flexion throughout as pt verbalizes need for better posture. Distance: 200'  Device: Rolling Walker  Comments: Pt requests repeated walks (3x) down santos, appears to be venting, but also engaging in positive self-talk, encouraging larger steps, better posture with good result. Stairs  # Steps : 18  Stairs Height: 8\"(stairwell)  Rails: Bilateral  Device: No Device  Assistance: Contact guard assistance  Comment: Step-to pattern. Pt verbalizes and adopts proper sequencing today, able to continue throughout (repeating self-cues) without as many breaks in up, down as on gym steps. Self-care Equipment Needs:  Equipment Needed: Yes  Reacher: for LBD  Sock Aid: X  Other: Long Handled Sponge    Self-care Assist Bathin - Able to bathe all 10 areas with device  Dressing-Upper: 6 - Independent with device/prosthesis  Dressing-Lower: 6 - Independent with device/prosthesis  Toiletin - Requires device (grab bar/walker/etc.)  Toilet Transfer: 6 - Independent with device (grab bar/walker/slide bar)    Nursing   Toilet Every 2 Hours-In Advance of Need: No (Comment)(Able to make needs known)Activity: Up to chair,Activity and Safety  Activity: Up to chair  Activity Assistance: Stand by assist  Repositioned: Turns self  Distance Ambulated (ft): 20 ft  Ambulation Response:  Tolerated well  Assistive Device: Front wheel walker  Safety: Bed in lower position, Call light within reach, Side rails up X 3, Visitors at bedside  Anti-Embolism Devices: Bilateral, Antiembolism stockings, knee  Anti-Embolism Intervention: On  Heels/Feet: Heel(s) elevated while up in chair,Activity: Up to chair,Pressure Ulcer or Non-Healing Wound: No  Alarm On: Personal   Bladder: 6 - Uses device independently (including EMPTYING of device, or uses medication)  Bowel: 6 - Uses toilet independently with device or oral medication(s)

## 2019-06-03 NOTE — CARE COORDINATION
Acute Rehab Unit Full FIM Progress    Admission score Current score    Goal       Self-Care     Eatin - Requires help steadying cup or utensil/check mouth for pocketing 6 - Feeds self with adaptive equipment/dentures and/or feeds self with modified diet and/or performs own tube feeding 6   Groomin - Able to perform 3-4 tasks with touching help(Pt stands w CGA while brushing her teeth for oral hygiene. Pt washes face/hands. Pt has perm & wants her hair to stay the way it is. ) 6 - Independent with all tasks using assistive device 6   Bathin - Able to bathe 8-9 areas(Pt sits to wash UB. Pt stands w CGA to wash ana/bottom w 1 hand holding onto walker. OT instructed pt in LHS to wash her legs - pt does this but need assist to wash bottom of her feet. ) 6 - Able to bathe all 10 areas with device 6   Dressing-Upper: 4 - Requires assist with buttons/zippers only and/or requires assist with one arm only(Pt need min assist to pull her shirt down. ) 6 - Independent with device/prosthesis 6   Dressing-Lower: 3 - Requires assist with 2-3 parts of dressing(Pt wears footies, brief pullup underpants, cut off hospital pants. OT instructed pt in using reacher to doff footies and sarita hospital pants, sockaid to sarita footies w mod assist) 6 - Independent with device/prosthesis 6   Toiletin - Requires steadying assistance only 6 - Requires device (grab bar/walker/etc.) 6     Spincter Control     Bladder: 3 - Requires 25-49% assist with device (place & hold)    6 - Uses device independently (including EMPTYING of device, or uses medication) 5             Bladder Frequency of Accidents: 6 - No accidents,uses device like urinal, bedpan, absorbant pad, or requires medication to manage bladder    6 - No accidents,uses device like urinal, bedpan, absorbant pad, or requires medication to manage bladder    Bowel: 4 - Requires touching assistance to manage or place device (e.g.  insertion of suppository only) < 25% assist    6 - go up and down one flight of stairs(18 8\" steps, B HRs) 2     Communication     Comprehension: 5 - Patient understands basic needs (hungry/hot/pain) 5 - Patient understands basic needs (hungry/hot/pain) 6   Expression: 5 - Expresses basic ideas/needs only (hungry/hot/pain) Expression: 6 - Device used to express complex ideas/needs 6     Social Cognition     Social Interaction: 4 - Patient appropriate 75-90%+ of the time Social Interaction: 5 - Patient is appropriate with supervision/cues 6   Problem Solvin - Patient solves simple/routine tasks 75-90%+  5 - Patient able to solve simple/routine tasks 5   Memory: 7 - Patient independent with meds/people/schedule 5 - Patient requires prompting with stress/unfamiliar situations 7

## 2019-06-03 NOTE — PLAN OF CARE
Problem: Pain:  Goal: Control of acute pain  Description  Control of acute pain  6/3/2019 0919 by Megan High RN  Outcome: Ongoing  Note:   Pain is controlled at this time with the use of prn pain meds. Will continue to assess. Problem: Risk for Impaired Skin Integrity  Goal: Tissue integrity - skin and mucous membranes  Description  Structural intactness and normal physiological function of skin and  mucous membranes. 6/3/2019 0919 by Megan High RN  Outcome: Ongoing  Note:   There are no new skin issues so far this shift. Will continue to assist patient with repositioning at least every two hours. Problem: Falls - Risk of:  Goal: Will remain free from falls  Description  Will remain free from falls  6/3/2019 0919 by Megan High RN  Outcome: Ongoing  Note:   Patient remains free from falls so far this shift. Will continue to round at least hourly, place bed in lowest position with call light within reach, and alarm activated.

## 2019-06-04 VITALS
BODY MASS INDEX: 26.21 KG/M2 | HEIGHT: 59 IN | TEMPERATURE: 98.1 F | DIASTOLIC BLOOD PRESSURE: 73 MMHG | HEART RATE: 80 BPM | OXYGEN SATURATION: 100 % | WEIGHT: 130 LBS | SYSTOLIC BLOOD PRESSURE: 138 MMHG | RESPIRATION RATE: 18 BRPM

## 2019-06-04 PROCEDURE — 6370000000 HC RX 637 (ALT 250 FOR IP): Performed by: ORTHOPAEDIC SURGERY

## 2019-06-04 PROCEDURE — 99232 SBSQ HOSP IP/OBS MODERATE 35: CPT | Performed by: INTERNAL MEDICINE

## 2019-06-04 PROCEDURE — 94640 AIRWAY INHALATION TREATMENT: CPT

## 2019-06-04 PROCEDURE — 6370000000 HC RX 637 (ALT 250 FOR IP): Performed by: PHYSICAL MEDICINE & REHABILITATION

## 2019-06-04 PROCEDURE — 94761 N-INVAS EAR/PLS OXIMETRY MLT: CPT

## 2019-06-04 PROCEDURE — 97530 THERAPEUTIC ACTIVITIES: CPT

## 2019-06-04 PROCEDURE — 99239 HOSP IP/OBS DSCHRG MGMT >30: CPT | Performed by: PHYSICAL MEDICINE & REHABILITATION

## 2019-06-04 PROCEDURE — 97116 GAIT TRAINING THERAPY: CPT

## 2019-06-04 RX ORDER — SIMETHICONE 80 MG
80 TABLET,CHEWABLE ORAL EVERY 6 HOURS PRN
Status: DISCONTINUED | OUTPATIENT
Start: 2019-06-04 | End: 2019-06-04 | Stop reason: HOSPADM

## 2019-06-04 RX ORDER — OXYCODONE HYDROCHLORIDE AND ACETAMINOPHEN 5; 325 MG/1; MG/1
1 TABLET ORAL EVERY 6 HOURS PRN
Qty: 28 TABLET | Refills: 0 | Status: SHIPPED | OUTPATIENT
Start: 2019-06-04 | End: 2019-06-11

## 2019-06-04 RX ORDER — LORAZEPAM 0.5 MG/1
0.5 TABLET ORAL EVERY 8 HOURS PRN
Qty: 21 TABLET | Refills: 0 | Status: SHIPPED | OUTPATIENT
Start: 2019-06-04 | End: 2019-06-11

## 2019-06-04 RX ORDER — FUROSEMIDE 20 MG/1
20 TABLET ORAL DAILY PRN
Qty: 60 TABLET | Refills: 3 | Status: SHIPPED | OUTPATIENT
Start: 2019-06-04 | End: 2019-07-01 | Stop reason: SDUPTHER

## 2019-06-04 RX ORDER — LEFLUNOMIDE 10 MG/1
10 TABLET ORAL DAILY
Qty: 30 TABLET | Refills: 3 | Status: SHIPPED | OUTPATIENT
Start: 2019-06-04

## 2019-06-04 RX ORDER — ERGOCALCIFEROL (VITAMIN D2) 1250 MCG
50000 CAPSULE ORAL WEEKLY
Qty: 5 CAPSULE | Refills: 0 | Status: SHIPPED | OUTPATIENT
Start: 2019-06-09 | End: 2019-06-13

## 2019-06-04 RX ORDER — LEVOTHYROXINE SODIUM 0.05 MG/1
50 TABLET ORAL DAILY
Qty: 30 TABLET | Refills: 3 | Status: SHIPPED | OUTPATIENT
Start: 2019-06-04 | End: 2020-05-04 | Stop reason: SDUPTHER

## 2019-06-04 RX ORDER — METOPROLOL TARTRATE 50 MG/1
50 TABLET, FILM COATED ORAL 2 TIMES DAILY
Qty: 60 TABLET | Refills: 3 | Status: SHIPPED | OUTPATIENT
Start: 2019-06-04 | End: 2019-07-01 | Stop reason: SDUPTHER

## 2019-06-04 RX ORDER — PANTOPRAZOLE SODIUM 40 MG/1
40 TABLET, DELAYED RELEASE ORAL
Qty: 30 TABLET | Refills: 3 | Status: SHIPPED | OUTPATIENT
Start: 2019-06-04 | End: 2019-07-01 | Stop reason: SDUPTHER

## 2019-06-04 RX ORDER — HYDROXYCHLOROQUINE SULFATE 200 MG/1
200 TABLET, FILM COATED ORAL DAILY
Qty: 30 TABLET | Refills: 0 | Status: SHIPPED | OUTPATIENT
Start: 2019-06-04

## 2019-06-04 RX ORDER — SIMETHICONE 80 MG
80 TABLET,CHEWABLE ORAL EVERY 6 HOURS PRN
Qty: 180 TABLET | Refills: 3 | COMMUNITY
Start: 2019-06-04

## 2019-06-04 RX ORDER — LOSARTAN POTASSIUM 25 MG/1
25 TABLET ORAL DAILY
Qty: 30 TABLET | Refills: 3 | Status: SHIPPED | OUTPATIENT
Start: 2019-06-04 | End: 2019-07-01 | Stop reason: SDUPTHER

## 2019-06-04 RX ORDER — LIDOCAINE 4 G/G
1 PATCH TOPICAL DAILY
COMMUNITY
Start: 2019-06-04

## 2019-06-04 RX ORDER — PREDNISONE 10 MG/1
10 TABLET ORAL DAILY
Qty: 10 TABLET | Refills: 0 | Status: SHIPPED | OUTPATIENT
Start: 2019-06-04 | End: 2019-06-14

## 2019-06-04 RX ORDER — LIDOCAINE 4 G/G
1 PATCH TOPICAL DAILY
COMMUNITY
Start: 2019-06-04 | End: 2019-06-13

## 2019-06-04 RX ADMIN — LEVOTHYROXINE SODIUM 50 MCG: 50 TABLET ORAL at 06:16

## 2019-06-04 RX ADMIN — LORAZEPAM 0.5 MG: 0.5 TABLET ORAL at 13:49

## 2019-06-04 RX ADMIN — PANTOPRAZOLE SODIUM 40 MG: 40 TABLET, DELAYED RELEASE ORAL at 06:16

## 2019-06-04 RX ADMIN — IPRATROPIUM BROMIDE AND ALBUTEROL SULFATE 1 AMPULE: .5; 3 SOLUTION RESPIRATORY (INHALATION) at 11:58

## 2019-06-04 RX ADMIN — LOSARTAN POTASSIUM 25 MG: 25 TABLET ORAL at 08:42

## 2019-06-04 RX ADMIN — OXYCODONE HYDROCHLORIDE AND ACETAMINOPHEN 1 TABLET: 5; 325 TABLET ORAL at 03:20

## 2019-06-04 RX ADMIN — PREDNISONE 10 MG: 10 TABLET ORAL at 08:42

## 2019-06-04 RX ADMIN — SENNOSIDES,DOCUSATE SODIUM 1 TABLET: 8.6; 5 TABLET, FILM COATED ORAL at 08:42

## 2019-06-04 RX ADMIN — OXYCODONE HYDROCHLORIDE AND ACETAMINOPHEN 1 TABLET: 5; 325 TABLET ORAL at 08:42

## 2019-06-04 RX ADMIN — METOPROLOL TARTRATE 50 MG: 50 TABLET ORAL at 08:42

## 2019-06-04 RX ADMIN — LORAZEPAM 0.5 MG: 0.5 TABLET ORAL at 03:20

## 2019-06-04 ASSESSMENT — PAIN SCALES - GENERAL
PAINLEVEL_OUTOF10: 8
PAINLEVEL_OUTOF10: 8
PAINLEVEL_OUTOF10: 10
PAINLEVEL_OUTOF10: 6
PAINLEVEL_OUTOF10: 8
PAINLEVEL_OUTOF10: 0

## 2019-06-04 ASSESSMENT — PAIN DESCRIPTION - LOCATION
LOCATION: KNEE
LOCATION: KNEE

## 2019-06-04 ASSESSMENT — PAIN DESCRIPTION - PAIN TYPE
TYPE: CHRONIC PAIN
TYPE: CHRONIC PAIN

## 2019-06-04 ASSESSMENT — PAIN DESCRIPTION - ORIENTATION
ORIENTATION: LEFT
ORIENTATION: LEFT

## 2019-06-04 NOTE — PROGRESS NOTES
Spoke with Dr. Radha Anthony via telephone regarding patient's Dahlia Collin. Medication is to be stopped today and patient is to follow-up next week. (appt already scheduled for 6/6).

## 2019-06-04 NOTE — PROGRESS NOTES
[address]   ACUTE REHABILITATION OCCUPATIONAL THERAPY  DAILY NOTE    Date: 19  Patient Name: King English      Room: 9956/4897-53    MRN: [id number]   : 1935  (80 y.o.)  Gender: female   Referring Practitioner: Robert Bob / Muriel Banks / Alejandro Dunaway   (rehab)       Dr. 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 : (R hip hemiarthroplasty 19; L MARYANN)  Hip Precautions: No hip flexion > 90 degrees, No hip extension, No hip external rotation, No ADduction(No hip *hyper*extension, no ADD past midline. )  Other position/activity restrictions: Posterolateral approach - no hip flexion greater than 90 degrees, no hip adduction across midline, no hip hyperextension, and no external rotation. Right Lower Extremity Weight Bearing: Weight Bearing As Tolerated  Required Braces or Orthoses?: No    Subjective  Subjective: Pt very talkative, difficult for writer to change topic/subject. Pt reports no concerns c completing self care tasks at home. Comments: Pt c planned DC today.    Patient Currently in Pain: Yes  Pain Level: 6  Pain Location: Knee  Pain Orientation: Left  Restrictions/Precautions: Fall Risk;Weight Bearing  Overall Orientation Status: Within Functional Limits     Pain Assessment  Pain Assessment: 0-10  Pain Level: 6  Pain Type: Chronic pain  Pain Location: Knee  Pain Orientation: Left  Pain Descriptors: Aching    OT FIM:   Eatin - Feeds self with adaptive equipment/dentures and/or feeds self with modified diet and/or performs own tube feeding  Groomin - Independent with all tasks using assistive device  Bathin - Able to bathe all 10 areas with device  Dressing-Upper: 6 - Independent with device/prosthesis  Dressing-Lower: 6 - Independent with device/prosthesis  Toiletin - Requires device (grab bar/walker/etc.)  Toilet Transfer: 6 - Independent with device (grab bar/walker/slide bar)  Primary Mode: Shower  Tub Transfer: 0 - Activity does not occur  Shower Transfer: 0 - Activity does not occur    Social Interaction: 5 - Patient is appropriate with supervision/cues  Problem Solvin - Patient able to solve simple/routine tasks  Memory: 4 - Patient remembers 75-90%+ of the time    Assessment  Performance deficits / Impairments: Decreased functional mobility ; Decreased ADL status; Decreased ROM; Decreased strength;Decreased safe awareness;Decreased cognition;Decreased endurance;Decreased balance;Decreased high-level IADLs;Decreased coordination  Assessment: Pt c good awarenss of hip precautions. Prognosis: Good  Discharge Recommendations: Home with assist PRN;Home with Home health OT; Home with nursing aide  Activity Tolerance: Patient Tolerated treatment well  Safety Devices in place: Yes        Patient Education: home discharge planning for safety and independence, reinforced all previous education  Patient Goals   Patient goals : To Return Home able to care for herself      Learner:patient and significant other  Method: explanation       Outcome: acknowledged understanding of  and asked questions     Plan  Plan  Times per week: 900/7   Times per day: Twice a day  Current Treatment Recommendations: Self-Care / ADL, Balance Training, Functional Mobility Training, Endurance Training, Safety Education & Training, Patient/Caregiver Education & Training, Positioning, Strengthening, ROM, Cognitive Reorientation, Home Management Training  Plan Comment: continue OT  Patient Goals   Patient goals :  To Return Home able to care for herself   Short term goals  Time Frame for Short term goals: 1 week   Short term goal 1: Pt will V/D Good understanding of AE/DME/modified techniques for increased IND with self-care and mobility.(goal met)  Short term goal 2: Pt will actively participate in 10+ minutes of self-care to the best of Pt's ability to promote increased IND with self-care.(goal met)  Short term goal 3: Pt will perform bed mobility with Min A to sit EOB for increased participation with self-care.(goal met)  Short term goal 4: Pt will perform sit to stand transfer with Min A with RW with Good safety awareness.(goal met)  Short term goal 5: Pt will actively participate in 10-15 minutes in therapeutic exercise/functional activities to promote increased IND with self-care and mobility.(goal met)  Long term goals  Time Frame for Long term goals : By Discharge   Long term goal 1: D/V understanding of Joint protection strategies with application to care needs (goal met)  Long term goal 2: Mod I for basic premorbid self care tasks  (goal met)  Long term goal 3: Tolerate standing for 10 minutes for self care and home management tasks (goal met)        06/04/19 1048   OT Individual Minutes   Time In [address] [place] [address]   Minutes 30     Electronically signed by RAI Hopkins on 6/4/19 at 10:53 AM

## 2019-06-04 NOTE — PROGRESS NOTES
Physical Therapy  [facility]: PILAR BARNES  Acute Rehabilitation Physical Therapy Progress Note    Date: 19  Patient Name: Rodriguez Adams       Room: 8060/7081-37  MRN: [id number]   Account: [de-identified]   : 1935  (80 y.o.) Gender: female     Referring Practitioner: Robert Phipps / Cha Marroquin / Denae Tiwari   (rehab)       Dr. Catrina Junior (ortho)   Diagnosis: S/P R hip hemiarthroplasty 19  Past Medical History:  has a past medical history of Allergic rhinitis, Anemia, Anxiety, Asthma, Cataract, COPD (chronic obstructive pulmonary disease) ([address] CTS (carpal tunnel syndrome), Esophageal reflux, Headache(784.0), Hyperlipidemia, Hypertension, Hypothyroidism, Osteoarthritis, Osteoporosis, and Rheumatoid arthritis(714.0). Past Surgical History:   has a past surgical history that includes Rotator cuff repair (Left); Tonsillectomy and adenoidectomy; Hysterectomy, vaginal; Foot surgery; Hemorrhoid surgery; Dilation and curettage of uterus; Cataract removal with implant (Bilateral); Inguinal hernia repair; Cystocele repair; joint replacement (Left); other surgical history; Hammer toe surgery; other surgical history; and HEMIARTHROPLASTY HIP (Right, 2019). Additional Pertinent Hx: RA       Restrictions/Precautions  Restrictions/Precautions: Fall Risk;Weight Bearing  Required Braces or Orthoses?: No  Implants present? : (R hip hemiarthroplasty 19; L MARYANN)  Lower Extremity Weight Bearing Restrictions  Right Lower Extremity Weight Bearing: Weight Bearing As Tolerated  Position Activity Restriction  Hip Precautions: No hip flexion > 90 degrees; No hip extension; No hip external rotation; No ADduction(No hip *hyper*extension, no ADD past midline)  Other position/activity restrictions: Posterolateral approach - no hip flexion greater than 90 degrees, no hip adduction across midline, no hip hyperextension, and no external rotation.         Subjective: Patient reports she is ready for home DC this PM.       Comments: prosthesis OR requires extra amount of time OR there is concern for safety  Wheel Chair: 6 - Modified [place]ang Operates wheelchair at least 150 feet with an ambulatory device, orthosis or prosthesis OR requires extra amount of time OR there is concern for safety  Stairs: 6 - Modified Strang Safely goes up and down at least one flight of stairs but requries a side support, handrail, cane, or portable supports, or the activity takes more than a reasonable amount of times, or there are safety considerations       BALANCE Posture: Fair(Kyphotic, F head)  Sitting - Static: Good(EOC, no support)  Sitting - Dynamic: Fair;+(EOC, no back support)  Standing - Static: Fair;+(RW)  Standing - Dynamic: Fair(RW)  Comments: Can stand for several minutes without UE support    EXERCISES    Other exercises?: Yes  Other exercises 2: Seated BLE EX: 1.5# L LE/AROM R LE/lime (min) resistance band, 20x each  Other exercises 3: Reviewed hip precautions  Other Activities  Comment:  Breaks given PRN        Activity Tolerance: Patient Tolerated treatment well(Rest breaks PRN)  PT Equipment Recommendations  Equipment Needed: Yes  Walker: Rolling(Paz)  Other: Brayden Cobian was evaluated today and a DME order was entered for a paz wheeled walker because she requires this to successfully complete daily living tasks of personal cares and ambulating. A wheeled walker is necessary due to the patient's unsteady gait, upper body weakness, and inability to  an ambulation device; and she can ambulate only by pushing a walker instead of a lesser assistive device such as a cane, crutch, or standard walker. The need for this equipment was discussed with the patient and she understands and is in agreement. DME order was given to [facility].          Current Treatment Recommendations: Strengthening, ROM, Balance Training, Transfer Training, Gait Training, Stair training, Neuromuscular Re-education, Functional Mobility Training, Home Exercise Program, Safety Education & Training, Patient/Caregiver Education & Training, Wheelchair Mobility Training, Equipment Evaluation, Education, & procurement    Conditions Requiring Skilled Therapeutic Intervention  Body structures, Functions, Activity limitations: Decreased functional mobility ; Decreased ADL status; Decreased ROM; Decreased strength  Assessment: High endurance for activity. Talkative nature can get in way of therapy. Patient Education: HEP, hip precautions  REQUIRES PT FOLLOW UP: Yes  Discharge Recommendations: Home with assist PRN;Home with Home health PT    Goals  Short term goals  Time Frame for Short term goals: by 1 week  Short term goal 1: supervision bed mobility  Short term goal 2: transfers Min A from multiple surfaces  Short term goal 3: Ambulate 30 ft with RW in < 3 min   Short term goal 4: Amb up/down 5 stairs el HR mod A x 1  Short term goal 5: min A WC mobility 25 ft using BLE.   Long term goals  Time Frame for Long term goals : by discharge  Long term goal 1: Bed mobility Mod I  Long term goal 2: Transfers Mod I multiple surfaces  Long term goal 3: Ambulate 50-60 ft MI with RW  Long term goal 4: Amb up/down 5 steps el HR min A x 1  Long term goal 5: pt will be indep with wc mobility 50 ft       06/04/19 0900   PT Individual Minutes   Time In 0900   Time Out 0940   Minutes 40       Electronically signed by Myriam Gonzalez PTA on 6/4/19 at 4:47 PM

## 2019-06-04 NOTE — DISCHARGE SUMMARY
Physical Medicine & Rehabilitation  Discharge Summary     Patient Identification:  King English  : 1935  Admit date: 2019  Discharge date: 19   Attending provider: Ely Sky MD        Primary care provider: Joann Atkins MD     Discharge Diagnoses:   R hip fracture s/p hemiarthroplasty, Rheumatoid Arthritis, Post-op anemia, anxiety, hyperglycemia - resolved, HTN, hypokalemia, COPD/Methotrexate lung, GERD    Discharge Functional Status:    Physical therapy:  Bed Mobility: Rolling: Rolling Left, Supervision(complains of increased pain on roll R attempt)  Supine to Sit: Modified independent  Sit to Supine: Supervision  Scooting: Modified independent  Transfers: Sit to Stand: Modified independent  Stand to sit: Modified independent  Bed to Chair: Stand by assistance  Stand Pivot Transfers: Modified independent  Comment: Max vc's to stay on tasks. , WB Status: WBAT R  Ambulation 1  Surface: level tile  Device: Rolling Walker  Assistance: Modified Independent  Quality of Gait: slow, steady pace; hip and knee flexion throughout; step-through pattern. Kyphotic; some reduction of flexion throughout as pt verbalizes need for better posture. Gait Deviations: Slow Diane  Distance: 200'  Comments: Pt requests repeated walks (3x) down santos, appears to be venting, but also engaging in positive self-talk, encouraging larger steps, better posture with good result., Stairs  # Steps : 9  Stairs Height: 8\"(stairwell)  Rails: Bilateral  Device: No Device  Assistance: Stand by assistance  Comment: Step-to pattern. Pt verbalizes and adopts proper sequencing today, able to continue throughout (repeating self-cues) without as many breaks in up, down as on gym steps.    Mobility: Bed, Chair, Wheel Chair: 6 - Requires assistive device (slide rail)  Walk: 6 - Modified [place]  Walks at least 150 feet with an ambulatory device, orthosis or prosthesis OR requires extra amount of time OR there is concern for safety  Distance Walked: 200'(RW)  Wheel Chair: 5 - Supervision Requires standby supervision or cuing to operate wheelchair at least 150 feet  Stairs: 2- Maximal Assistance Performs 25-49% of the effort to go up and down 4 to 6 stairs and requires the assistance of one person only(9 8\" steps, B HRs), PT Equipment Recommendations  Equipment Needed: Yes  Mobility Devices: Al Aiken: Rolling(Paz)  Other: Palak Simms was evaluated today and a DME order was entered for a paz wheeled walker because she requires this to successfully complete daily living tasks of personal cares and ambulating. A wheeled walker is necessary due to the patient's unsteady gait, upper body weakness, and inability to  an ambulation device; and she can ambulate only by pushing a walker instead of a lesser assistive device such as a cane, crutch, or standard walker. The need for this equipment was discussed with the patient and she understands and is in agreement. DME order was given to [address] [place] [address]., Assessment: High endurance for activity. Talkative nature can get in way of therapy. Occupational therapy: Eatin - Feeds self with adaptive equipment/dentures and/or feeds self with modified diet and/or performs own tube feeding  Groomin - Independent with all tasks using assistive device  Bathin - Able to bathe all 10 areas with device  Dressing-Upper: 6 - Independent with device/prosthesis  Dressing-Lower: 6 - Independent with device/prosthesis  Toiletin - Requires device (grab bar/walker/etc.)  Toilet Transfer: 6 - Independent with device (grab bar/walker/slide bar)  Tub Transfer: 0 - Activity does not occur  Shower Transfer: 0 - Activity does not occur, Equipment Recommendations  Equipment Needed: Yes  Reacher: for LBD  Sock Aid: X  Other: Long Handled Sponge, Assessment: Pt c good awarenss of hip precautions.      Speech therapy:  Comprehension: 5 - Patient understands basic needs multiple joints as well as recent R hip hemiarthroplasty repair of fracture. Medications, precautions and follow up reviewed with patient and family. Per Dr. Sanford Ramoss can be discontinued today. She has follow up appointment tomorrow for staple removal. Need ortho approval for start date to resume RA medications. Follow-up visits: See after visit summary from hospitalization    Disposition:  Home with home health. Discharge Medications:   Beatrice Fung   Home Medication Instructions [id number]    Printed on:06/04/19 9659   Medication Information                      acetaminophen (TYLENOL) 325 MG tablet  Take 650 mg by mouth every 6 hours as needed for Pain             ASMANEX 120 METERED DOSES 220 MCG/INH inhaler               aspirin 81 MG tablet  Take 81 mg by mouth daily             bacitracin-polymyxin b (POLYSPORIN) 500-31873 UNIT/GM ophthalmic ointment               Calcium Carbonate-Vit D-Min (CALTRATE 600+D PLUS MINERALS) 600-800 MG-UNIT TABS  Take  by mouth daily. desloratadine (CLARINEX) 5 MG tablet  Take 5 mg by mouth daily. ergocalciferol (ERGOCALCIFEROL) 32340 units capsule  Take 1 capsule by mouth once a week             fluticasone (FLONASE) 50 MCG/ACT nasal spray               formoterol (PERFOROMIST) 20 MCG/2ML nebulizer solution  Take 20 mcg by nebulization as needed. furosemide (LASIX) 20 MG tablet  Take 1 tablet by mouth daily as needed (per Dr. Fawad Kiran)             GARLIC  Take  by mouth daily.              hydroxychloroquine (PLAQUENIL) 200 MG tablet  Take 1 tablet by mouth daily Can resume when Dr. Alex Doran approves             leflunomide (ARAVA) 10 MG tablet  Take 1 tablet by mouth daily Can resume when Dr. Alex Doran approves             levothyroxine (SYNTHROID) 50 MCG tablet  Take 1 tablet by mouth daily             lidocaine 4 % external patch  Place 1 patch onto the skin daily R knee             lidocaine 4 % external patch  Place 1 patch onto the skin daily L knee             LORazepam (ATIVAN) 0.5 MG tablet  Take 1 tablet by mouth every 8 hours as needed for Anxiety for up to 7 days. losartan (COZAAR) 25 MG tablet  Take 1 tablet by mouth daily             metoprolol tartrate (LOPRESSOR) 50 MG tablet  Take 1 tablet by mouth 2 times daily             Multiple Vitamins-Minerals (MULTIVITAMIN PO)  Take  by mouth daily. neomycin-polymyxin-dexameth 3.5-18206-0.1 OINT               Omega-3 Fatty Acids (FISH OIL) 1200 MG CAPS  Take  by mouth daily. oxyCODONE-acetaminophen (PERCOCET) 5-325 MG per tablet  Take 1 tablet by mouth every 6 hours as needed for Pain for up to 7 days. pantoprazole (PROTONIX) 40 MG tablet  Take 1 tablet by mouth every morning (before breakfast)             polyethylene glycol (MIRALAX) PACK packet  Take 17 g by mouth daily. predniSONE (DELTASONE) 10 MG tablet  Take 1 tablet by mouth daily for 10 days             PROAIR  (90 BASE) MCG/ACT inhaler               simethicone (MYLANTA GAS) 125 MG chewable tablet  Take 125 mg by mouth every 6 hours as needed. simethicone (MYLICON) 80 MG chewable tablet  Take 1 tablet by mouth every 6 hours as needed for Flatulence             vitamin B-12 (CYANOCOBALAMIN) 500 MCG tablet  Take 500 mcg by mouth daily. zoledronic acid (RECLAST) 5 MG/100ML SOLN  Infuse 5 mg intravenously once.  Yearly                 Gita Hallman MD

## 2019-06-04 NOTE — PLAN OF CARE
Problem: Pain:  Goal: Control of acute pain  Description  Control of acute pain  Outcome: Ongoing  Note:   Pain is controlled with the use of prn pain meds and ice packs. Will continue to assess. Problem: Risk for Impaired Skin Integrity  Goal: Tissue integrity - skin and mucous membranes  Description  Structural intactness and normal physiological function of skin and  mucous membranes. Outcome: Ongoing  Note:   There are no new skin issues so far this shift. Will continue to assist patient with repositioning at least every two hours. Problem: Falls - Risk of:  Goal: Will remain free from falls  Description  Will remain free from falls  Outcome: Ongoing  Note:   Patient remains free from falls so far this shift. Will continue to round at least hourly, place bed in lowest position with call light within reach, and alarm activated.

## 2019-06-04 NOTE — CARE COORDINATION
Writer discussed d/c plan with the pt, her  and son. All agreed the pt would benefit from vns. Writer offered them choices. They stated they have spoken to a camacho from [facility] [employer] and would like to stay with that company.

## 2019-06-04 NOTE — PROGRESS NOTES
[facility]    Date:   6/4/2019  Patient name:  Fredrick Simon  Date of admission:  5/21/2019  6:04 PM  MRN:   [id number]  YOB: 1935      HPI  Patient has history of rheumatoid arthritis, chronically on steroids, and osteoporosis. Patient had right hip fracture, after she sustained a fall. S/P R hip hemiarthroplasty 5/17/19.    6/3/2019  Pt seen in therapy. Doing well. No complaints voiced. Has a little muscle stiffness that improves with therapy. No labs obtained since 6/1/2019. Tolerating diet. Mildly hypertensive this am 154/85 mmHg, am antihypertensive medications given following bp reading. Receiving aerosols for COPD. No current wheezing. Receiving Oxycodone for pain every 6 prn.    6/4/2019  Sitting, dangling off bed this am, c/o gas discomfort to abdomen, having BM's. No c/o constipation. Hemodynamically stable          REVIEW OF SYSTEMS:    · Cardiovascular: Negative for lightheadedness/orthostatic symptoms ,chest pain, dyspnea on exertion, palpitations or loss of consciousness. · Respiratory: Negative for cough or wheezing, sputum production, hemoptysis, pleuritic pain. · Gastrointestinal: Negative for nausea/vomiting, change in bowel habits, abdominal pain, dysphagia/appetite loss, hematemesis, blood in stools. C/O abdominal bloating. · Musculoskeletal: c/o morning stiffness, improves with therapy. Rt. Hip pain improving with narcotics. OBJECTIVE:    /73   Pulse 80   Temp 98.1 °F (36.7 °C) (Oral)   Resp 18   Ht 4' 11\" (1.499 m)   Wt 130 lb (59 kg)   SpO2 100%   BMI 26.26 kg/m²      · Lungs: clear to auscultation bilaterally,no wheeze. Wheel chair bound. · Heart: regular rate and rhythm, S1, S2 normal, no murmur, click, rub or gallop  · Abdomen: soft, non-tender; bowel sounds normal; no masses,  no organomegaly  · Extremities: extremities normal, atraumatic, no cyanosis or edema.  Wheel TECHNOLOGIST PROVIDED HISTORY: preop Ordering Physician Provided Reason for Exam: Pre op Acuity: Acute Type of Exam: Initial Right hip hemiarthroplasty for a hip fracture. FINDINGS: Bibasilar atelectasis. No focal airspace consolidation. No pleural effusion or pneumothorax. Heart size and mediastinal contours are stable. The pulmonary vascularity is normal.     Bibasilar atelectasis. Otherwise, no acute cardiopulmonary abnormality. Xr Hip 2-3 Vw W Pelvis Right    Result Date: 5/16/2019  EXAMINATION: ONE XRAY VIEW OF THE PELVIS AND TWO XRAY VIEWS RIGHT HIP 5/16/2019 2:02 am COMPARISON: 10/09/2015 HISTORY: ORDERING SYSTEM PROVIDED HISTORY: trauma TECHNOLOGIST PROVIDED HISTORY: trauma Ordering Physician Provided Reason for Exam: Pt c/o right hip pain s/p fall 2 hours ago. Acuity: Acute Type of Exam: Initial Mechanism of Injury: Pt c/o right hip pain s/p fall 2 hours ago. FINDINGS: Subcapital right proximal femur fracture with about 2 cm of proximal migration of distal fracture fragment. Pelvis appears intact. Status post left total hip replacement. Subcapital right femur fracture as described.        ASSESSMENT:    Patient Active Problem List   Diagnosis    Hypothyroidism    Osteoporosis    Esophageal reflux    Cataract    Hyperlipidemia    Osteoarthritis    Anemia    Allergic rhinitis    Asthma    Rheumatoid arthritis ([address]    Methotrexate lung    Benign essential HTN    Cushing syndrome ([address]    Closed fracture of right hip (HCC)    Closed fracture of neck of right femur with routine healing    History of hemiarthroplasty of right hip    S/P right hip fracture    Bilateral leg edema    Chronic diastolic CHF (congestive heart failure), NYHA class 1 ([facility])     Active Problems:    Osteoporosis    Osteoarthritis    Rheumatoid arthritis (HCC)    Methotrexate lung    History of hemiarthroplasty of right hip    S/P right hip fracture    Bilateral leg edema    Chronic diastolic CHF (congestive

## 2019-06-05 ENCOUNTER — CARE COORDINATION (OUTPATIENT)
Dept: CASE MANAGEMENT | Age: 84
End: 2019-06-05

## 2019-06-05 DIAGNOSIS — S72.001D CLOSED FRACTURE OF RIGHT HIP WITH ROUTINE HEALING, SUBSEQUENT ENCOUNTER: Primary | ICD-10-CM

## 2019-06-05 DIAGNOSIS — S72.001A CLOSED FRACTURE OF RIGHT HIP, INITIAL ENCOUNTER (HCC): Primary | ICD-10-CM

## 2019-06-05 PROCEDURE — 1111F DSCHRG MED/CURRENT MED MERGE: CPT

## 2019-06-05 NOTE — CARE COORDINATION
[facility] Transitions Initial Follow Up Call    Call within 2 business days of discharge: Yes    Patient: Harriett Ward Patient : 1935   MRN: [id number]  Reason for Admission: right hip  Discharge Date: 19 RARS: Readmission Risk Score: 20      Last Discharge [address] [address]       Complaint Diagnosis Description Type Department Provider    19  Primary osteoarthritis involving multiple joints . .. Admission (Discharged) Byron Ortiz MD           Spoke with: patient's     Facility: [facility]    Non-face-to-face services provided:  Scheduled appointment with Specialist-appointment with Dr. Gray Curtis 19  Obtained and reviewed discharge summary and/or continuity of care documents  Communication with home health agencies or other community services the patient is currently using-writer contacted [facility] spoke to gia, confirmed that referral was received, plan to contact patient today to set up visit  Assessment and support for treatment adherence and medication management-reviewed discharge instructions and medications, 1111F order   Writer spoke to patient's , reviewed discharge instructions and medications, 1111F order completed, he is going to  her new medications today. Writer contacted [facility] spoke to Torres Monique, confirmed that referral was received, requested a call to patient's  because he had stated he was waiting for them to call so he could go to the pharmacy and  his wife's medications, Torres Monique v/u and stated would call him.  Patient has f/u appointment with Dr. Gray Curtis on 19, patient doing well per , no needs or concerns at this time, confirmed contact information, explained that Madeline HEWITT would be following up fro a couple weeks,  v/u, will forward note on to CTC//JU    Care Transitions 24 Hour Call    Do you have any ongoing symptoms?:  No  Do you have a copy of your discharge instructions?:  Yes  Do you have all of your prescriptions and are they filled?:  No (Comment: going to  from pharmacy today)  Have you been contacted by a [address]?:  No  Have you scheduled your follow up appointment?:  Yes  How are you going to get to your appointment?:  Car - family or friend to transport  Were you discharged with any Home Care or Post Acute Services:  Yes  Post Acute Services:  Home Health (Comment:  [address])  Do you feel like you have everything you need to keep you well at home?:  Yes  Care Transitions Interventions     Other Services:   (Comment: [facility])          Follow Up  Future Appointments   Date Time Provider Onel Bolton   6/6/2019  1:50 PM Juan R Tubbs MD [id number]   6/10/2019 11:30 AM Deon James  [address] [place] [address],

## 2019-06-06 ENCOUNTER — OFFICE VISIT (OUTPATIENT)
Dept: ORTHOPEDIC SURGERY | Age: 84
End: 2019-06-06

## 2019-06-06 DIAGNOSIS — S72.001D CLOSED FRACTURE OF RIGHT HIP WITH ROUTINE HEALING, SUBSEQUENT ENCOUNTER: Primary | ICD-10-CM

## 2019-06-06 PROCEDURE — 99024 POSTOP FOLLOW-UP VISIT: CPT | Performed by: ORTHOPAEDIC SURGERY

## 2019-06-06 NOTE — PROGRESS NOTES
Subjective:      Patient ID: Sarah Grimm is a 80 y.o. female. HPI  Please refer to my previous clinic notes    Patient is status post right hip hemiarthroplasty for femoral neck fracture    Patient is doing quite well. Review of Systems    Objective:   Physical Exam  Vision is healing well    Staples discontinued    2 views right hip AP pelvis lateral right hip revealed patient be status post left total hip arthroplasty distant past  Hip hemiarthroplasty components look acceptable. Assessment:      Encounter Diagnosis   Name Primary?     Closed fracture of right hip with routine healing, subsequent encounter Yes           Plan:      Follow-up 4 weeks        Reino Hatchet, MD

## 2019-06-10 ENCOUNTER — CLINICAL DOCUMENTATION (OUTPATIENT)
Dept: CASE MANAGEMENT | Age: 84
End: 2019-06-10

## 2019-06-13 ENCOUNTER — CARE COORDINATION (OUTPATIENT)
Dept: CASE MANAGEMENT | Age: 84
End: 2019-06-13

## 2019-06-13 ENCOUNTER — OFFICE VISIT (OUTPATIENT)
Dept: INTERNAL MEDICINE CLINIC | Age: 84
End: 2019-06-13
Payer: MEDICARE

## 2019-06-13 VITALS
DIASTOLIC BLOOD PRESSURE: 60 MMHG | SYSTOLIC BLOOD PRESSURE: 102 MMHG | BODY MASS INDEX: 26.41 KG/M2 | HEIGHT: 59 IN | HEART RATE: 84 BPM | OXYGEN SATURATION: 93 % | WEIGHT: 131 LBS

## 2019-06-13 DIAGNOSIS — M05.79 RHEUMATOID ARTHRITIS INVOLVING MULTIPLE SITES WITH POSITIVE RHEUMATOID FACTOR (HCC): Primary | ICD-10-CM

## 2019-06-13 DIAGNOSIS — M17.0 PRIMARY OSTEOARTHRITIS OF BOTH KNEES: ICD-10-CM

## 2019-06-13 DIAGNOSIS — E03.9 HYPOTHYROIDISM, UNSPECIFIED TYPE: ICD-10-CM

## 2019-06-13 DIAGNOSIS — F41.1 GAD (GENERALIZED ANXIETY DISORDER): ICD-10-CM

## 2019-06-13 DIAGNOSIS — I10 BENIGN ESSENTIAL HTN: ICD-10-CM

## 2019-06-13 DIAGNOSIS — I50.32 CHRONIC DIASTOLIC CHF (CONGESTIVE HEART FAILURE), NYHA CLASS 1 (HCC): ICD-10-CM

## 2019-06-13 DIAGNOSIS — S72.001D CLOSED FRACTURE OF RIGHT HIP WITH ROUTINE HEALING, SUBSEQUENT ENCOUNTER: ICD-10-CM

## 2019-06-13 DIAGNOSIS — M81.0 OSTEOPOROSIS, UNSPECIFIED OSTEOPOROSIS TYPE, UNSPECIFIED PATHOLOGICAL FRACTURE PRESENCE: ICD-10-CM

## 2019-06-13 PROCEDURE — 99495 TRANSJ CARE MGMT MOD F2F 14D: CPT | Performed by: INTERNAL MEDICINE

## 2019-06-13 PROCEDURE — 1111F DSCHRG MED/CURRENT MED MERGE: CPT | Performed by: INTERNAL MEDICINE

## 2019-06-13 RX ORDER — LORAZEPAM 0.5 MG/1
0.5 TABLET ORAL EVERY 8 HOURS PRN
COMMUNITY
End: 2019-06-13 | Stop reason: SDUPTHER

## 2019-06-13 RX ORDER — OXYCODONE HYDROCHLORIDE AND ACETAMINOPHEN 5; 325 MG/1; MG/1
1 TABLET ORAL EVERY 6 HOURS PRN
Qty: 120 TABLET | Refills: 0 | Status: SHIPPED | OUTPATIENT
Start: 2019-06-13 | End: 2019-07-12 | Stop reason: SDUPTHER

## 2019-06-13 RX ORDER — OXYCODONE HYDROCHLORIDE AND ACETAMINOPHEN 5; 325 MG/1; MG/1
1 TABLET ORAL EVERY 6 HOURS PRN
COMMUNITY
End: 2019-06-13 | Stop reason: SDUPTHER

## 2019-06-13 RX ORDER — LORAZEPAM 0.5 MG/1
0.5 TABLET ORAL EVERY 8 HOURS PRN
Qty: 90 TABLET | Refills: 0 | Status: SHIPPED | OUTPATIENT
Start: 2019-06-13 | End: 2019-07-13

## 2019-06-13 NOTE — PROGRESS NOTES
Subjective:      Chief Complaint   Patient presents with    Follow-Up from Hospital     F/U from [facility] for hip surgery     Medication Problem     family will like to discuss pt medication        Patient ID: Ed Vickers is a 80 y.o. female. Visit Information    Have you changed or started any medications since your last visit including any over-the-counter medicines, vitamins, or herbal medicines? no   Are you having any side effects from any of your medications? -  no  Have you stopped taking any of your medications? Is so, why? -  no    Have you seen any other physician or provider since your last visit? No  Have you had any other diagnostic tests since your last visit? Yes - Records Obtained  Have you been seen in the emergency room and/or had an admission to a hospital since we last saw you? Yes - Records Obtained  Have you had your routine dental cleaning in the past 6 months? no    Have you activated your [employer] account? If not, what are your barriers?  No:      Patient Care Team:  Latonya Morrison MD as PCP - General (Internal Medicine)  Latonya Morrison MD as PCP - [facility]  Karine Lira RN as Care Transition    Medical History Review  Past Medical, Family, and Social History reviewed and does not contribute to the patient presenting condition    Health Maintenance   Topic Date Due    TSH testing  06/18/2019    Shingles Vaccine (1 of 2) 10/23/2019 (Originally 1/2/1985)    Potassium monitoring  05/31/2020    Creatinine monitoring  05/31/2020    DTaP/Tdap/Td vaccine (2 - Td) 03/09/2028    DEXA (modify frequency per FRAX score)  Completed    Flu vaccine  Completed    Pneumococcal 65+ years Vaccine  Completed     HPI    Review of Systems    Objective:   Physical Exam    Assessment / Plan:

## 2019-06-13 NOTE — CARE COORDINATION
[facility] Transitions Follow Up Call    2019    Patient: Ananth Cartagena  Patient : 1935   MRN: [id number]    Reason for Admission: R Hip Pb   Discharge Date: 19   RARS: Readmission Risk Score: 20  CMRS 2       Attempted to reach patient for subsequent transitional call. VM left to return call to [phone]. Care Transitions Subsequent and Final Call    Subsequent and Final Calls  Do you currently have any active services?:  Yes  Are you currently active with any services?:  Home Health  Care Transitions Interventions     Other Services:   (Comment: [facility])   Other Interventions:             Follow Up  Future Appointments   Date Time Provider Onel Bolton   2019  1:30 PM Feli Costa MD [address]   2019  2:50 PM Clifton Stauffer MD [place] Danielito Cardozo RN

## 2019-06-14 ENCOUNTER — CARE COORDINATION (OUTPATIENT)
Dept: CASE MANAGEMENT | Age: 84
End: 2019-06-14

## 2019-06-14 NOTE — CARE COORDINATION
[address] Transitions Follow Up Call    2019    Patient: Sarah Grimm  Patient : 1935   MRN: [id number]    Reason for Admission: R Hip padmini   Discharge Date: 19   RARS: Readmission Risk Score: 20  CMRS 2       Spoke with: Mr Kelly James     Patient  returns call. States pt doing \"pretty good\". States pain is well controlled and she is taking pain med about every 6 hours. States taking ativan about 2 times/ day   States staples are out and incision looks good. Denies swelling at the hip  States she is using ice as needed to help with pain   States she is getting around well with walker but is hoping to transition to using a cane soon  States PT coming today at 1300  Denies needs  Encouraged to call writer/ CTC or providers if questions or concerns- v/u     Care Transitions Subsequent and Final Call    Subsequent and Final Calls  Do you have any ongoing symptoms?:  No  Have your medications changed?:  No  Do you have any questions related to your medications?:  No  Do you currently have any active services?:  Yes  Are you currently active with any services?:  Home Health  Do you have any needs or concerns that I can assist you with?:  No  Identified Barriers:  Lack of Education  Care Transitions Interventions     Other Services:   (Comment: [facility])   Other Interventions:             Follow Up  Future Appointments   Date Time Provider Onel Bolton   2019  1:30 PM Jeison Srinivasan MD [address]   2019  2:50 PM Reino Hatchet, MD [place] Danielito Wiseman RN

## 2019-06-23 ASSESSMENT — ENCOUNTER SYMPTOMS
BLOOD IN STOOL: 0
COUGH: 0
CHEST TIGHTNESS: 0
COLOR CHANGE: 0
SHORTNESS OF BREATH: 0
EYE REDNESS: 0
EYE DISCHARGE: 0
CHOKING: 0
BACK PAIN: 1
EYE ITCHING: 0
APNEA: 0
EYE PAIN: 0
CONSTIPATION: 0
ABDOMINAL PAIN: 0
DIARRHEA: 0
ABDOMINAL DISTENTION: 0

## 2019-06-23 NOTE — PROGRESS NOTES
Post-Discharge Transitional Care Management Services or Hospital Follow Up      Rafael Silva   YOB: 1935    Date of Office Visit:  6/13/2019  Date of Hospital Admission: 5/21/19  Date of Hospital Discharge: 6/4/19  Readmission Risk Score(high >=14%.  Medium >=10%):Readmission Risk Score: 20      Care management risk score Rising risk (score 2-5) and Complex Care (Scores >=6): 2     Non face to face  following discharge, date last encounter closed (first attempt may have been earlier): *No documented post hospital discharge outreach found in the last 14 days *No documented post hospital discharge outreach found in the last 14 days    Call initiated 2 business days of discharge: *No response recorded in the last 14 days     Patient Active Problem List   Diagnosis    Hypothyroidism    Osteoporosis    Esophageal reflux    Cataract    Hyperlipidemia    Osteoarthritis    Anemia    Allergic rhinitis    Asthma    Rheumatoid arthritis ([address]    Methotrexate lung    Benign essential HTN    Cushing syndrome ([address]    Closed fracture of right hip ([address]    Closed fracture of neck of right femur with routine healing    History of hemiarthroplasty of right hip    S/P right hip fracture    Bilateral leg edema    Chronic diastolic CHF (congestive heart failure), NYHA class 1 ([facility])       Allergies   Allergen Reactions    Methotrexate Derivatives Other (See Comments)     unknown    No Known Allergies     Seasonal Other (See Comments)     Congestion       Medications listed as ordered at the time of discharge from hospital   [address] Medication Instructions MINDY:    Printed on:06/23/19 1133   Medication Information                      acetaminophen (TYLENOL) 325 MG tablet  Take 650 mg by mouth every 6 hours as needed for Pain             ASMANEX 120 METERED DOSES 220 MCG/INH inhaler               aspirin 81 MG tablet  Take 81 mg by mouth daily             bacitracin-polymyxin b (POLYSPORIN) 500-80204 UNIT/GM ophthalmic ointment               Calcium Carbonate-Vit D-Min (CALTRATE 600+D PLUS MINERALS) 600-800 MG-UNIT TABS  Take  by mouth daily. formoterol (PERFOROMIST) 20 MCG/2ML nebulizer solution  Take 20 mcg by nebulization as needed. furosemide (LASIX) 20 MG tablet  Take 1 tablet by mouth daily as needed (per Dr. Kimberly Marin)             GARLIC  Take  by mouth daily. hydroxychloroquine (PLAQUENIL) 200 MG tablet  Take 1 tablet by mouth daily Can resume when Dr. Lula Lennon approves             leflunomide (ARAVA) 10 MG tablet  Take 1 tablet by mouth daily Can resume when Dr. Lula Lennon approves             levothyroxine (SYNTHROID) 50 MCG tablet  Take 1 tablet by mouth daily             lidocaine 4 % external patch  Place 1 patch onto the skin daily R knee             LORazepam (ATIVAN) 0.5 MG tablet  Take 1 tablet by mouth every 8 hours as needed for Anxiety for up to 30 days. losartan (COZAAR) 25 MG tablet  Take 1 tablet by mouth daily             metoprolol tartrate (LOPRESSOR) 50 MG tablet  Take 1 tablet by mouth 2 times daily             Multiple Vitamins-Minerals (MULTIVITAMIN PO)  Take  by mouth daily. neomycin-polymyxin-dexameth 3.5-81741-4.1 OINT               Omega-3 Fatty Acids (FISH OIL) 1200 MG CAPS  Take  by mouth daily. oxyCODONE-acetaminophen (PERCOCET) 5-325 MG per tablet  Take 1 tablet by mouth every 6 hours as needed for Pain for up to 30 days. pantoprazole (PROTONIX) 40 MG tablet  Take 1 tablet by mouth every morning (before breakfast)             polyethylene glycol (MIRALAX) PACK packet  Take 17 g by mouth daily. simethicone (MYLANTA GAS) 125 MG chewable tablet  Take 125 mg by mouth every 6 hours as needed.              simethicone (MYLICON) 80 MG chewable tablet  Take 1 tablet by mouth every 6 hours as needed for Flatulence             vitamin B-12 (CYANOCOBALAMIN) 500 MCG tablet  Take 500 mcg by mouth daily. zoledronic acid (RECLAST) 5 MG/100ML SOLN  Infuse 5 mg intravenously once. Yearly                   Medications marked \"taking\" at this time  Outpatient Medications Marked as Taking for the 19 encounter (Office Visit) with Amy Faye MD   Medication Sig Dispense Refill    LORazepam (ATIVAN) 0.5 MG tablet Take 1 tablet by mouth every 8 hours as needed for Anxiety for up to 30 days. 90 tablet 0    oxyCODONE-acetaminophen (PERCOCET) 5-325 MG per tablet Take 1 tablet by mouth every 6 hours as needed for Pain for up to 30 days.  120 tablet 0    losartan (COZAAR) 25 MG tablet Take 1 tablet by mouth daily 30 tablet 3    metoprolol tartrate (LOPRESSOR) 50 MG tablet Take 1 tablet by mouth 2 times daily 60 tablet 3    [] predniSONE (DELTASONE) 10 MG tablet Take 1 tablet by mouth daily for 10 days 10 tablet 0    lidocaine 4 % external patch Place 1 patch onto the skin daily R knee      furosemide (LASIX) 20 MG tablet Take 1 tablet by mouth daily as needed (per Dr. Camilo Haas) 60 tablet 3    levothyroxine (SYNTHROID) 50 MCG tablet Take 1 tablet by mouth daily 30 tablet 3    pantoprazole (PROTONIX) 40 MG tablet Take 1 tablet by mouth every morning (before breakfast) 30 tablet 3    simethicone (MYLICON) 80 MG chewable tablet Take 1 tablet by mouth every 6 hours as needed for Flatulence 180 tablet 3    leflunomide (ARAVA) 10 MG tablet Take 1 tablet by mouth daily Can resume when Dr. Chantel Self approves 30 tablet 3    hydroxychloroquine (PLAQUENIL) 200 MG tablet Take 1 tablet by mouth daily Can resume when Dr. Golden Res approves 30 tablet 0    acetaminophen (TYLENOL) 325 MG tablet Take 650 mg by mouth every 6 hours as needed for Pain      aspirin 81 MG tablet Take 81 mg by mouth daily      neomycin-polymyxin-dexameth 3.5-49212-8.1 OINT   0    ASMANEX 120 METERED DOSES 220 MCG/INH inhaler   0    bacitracin-polymyxin b (POLYSPORIN) 500-72123 UNIT/GM ophthalmic ointment   0    Multiple Vitamins-Minerals (MULTIVITAMIN PO) Take  by mouth daily.  polyethylene glycol (MIRALAX) PACK packet Take 17 g by mouth daily.  Calcium Carbonate-Vit D-Min (CALTRATE 600+D PLUS MINERALS) 600-800 MG-UNIT TABS Take  by mouth daily.  Omega-3 Fatty Acids (FISH OIL) 1200 MG CAPS Take  by mouth daily.  GARLIC Take  by mouth daily.  simethicone (MYLANTA GAS) 125 MG chewable tablet Take 125 mg by mouth every 6 hours as needed.  zoledronic acid (RECLAST) 5 MG/100ML SOLN Infuse 5 mg intravenously once. Yearly      vitamin B-12 (CYANOCOBALAMIN) 500 MCG tablet Take 500 mcg by mouth daily.  formoterol (PERFOROMIST) 20 MCG/2ML nebulizer solution Take 20 mcg by nebulization as needed. Medications patient taking as of now reconciled against medications ordered at time of hospital discharge: Yes    Chief Complaint   Patient presents with    Follow-Up from Hospital     F/U from [facility] for hip surgery     Medication Problem     family will like to discuss pt medication        HPI- patient is here for transitional care. She has multiple medical problems which includes osteoporosis, rheumatoid arthritis on leflunomide, hypothyroidism, hypertension, congestive heart failure with preserved ejection fraction. She was recently admitted with right sided hip fracture. After mechanical fall. Patient had right hip surgery, she was later shifted to [facility] rehab, patient was discharged home recently. She is working with home physical therapy, she is using cane. She is compliant with her diet and medication. Inpatient course: Discharge summary reviewed- see chart. Interval history/Current status: Improved     Review of Systems   Constitutional: Negative for activity change, appetite change, chills, diaphoresis, fatigue and fever. HENT: Negative for congestion, dental problem, drooling and ear discharge. Eyes: Negative for pain, discharge, redness and itching. Respiratory: Negative for apnea, cough, choking, chest tightness and shortness of breath. Cardiovascular: Negative for chest pain and leg swelling. Gastrointestinal: Negative for abdominal distention, abdominal pain, blood in stool, constipation and diarrhea. Endocrine: Negative for cold intolerance and heat intolerance. Genitourinary: Negative for difficulty urinating, dysuria, enuresis, flank pain and frequency. Musculoskeletal: Positive for arthralgias, back pain and gait problem. Negative for joint swelling. Skin: Negative for color change, pallor and rash. Neurological: Negative for dizziness, facial asymmetry, light-headedness, numbness and headaches. Psychiatric/Behavioral: Negative for agitation, behavioral problems, confusion, decreased concentration and dysphoric mood. Vitals:    06/13/19 1209   BP: 102/60   Pulse: 84   SpO2: 93%   Weight: 131 lb (59.4 kg)   Height: 4' 11.02\" (1.499 m)     Body mass index is 26.44 kg/m². Wt Readings from Last 3 Encounters:   06/13/19 131 lb (59.4 kg)   05/22/19 130 lb (59 kg)   05/16/19 132 lb 4.4 oz (60 kg)     BP Readings from Last 3 Encounters:   06/13/19 102/60   06/04/19 138/73   05/21/19 (!) 149/67       Physical Exam   Constitutional: She is oriented to person, place, and time. She appears well-developed and well-nourished. HENT:   Head: Normocephalic and atraumatic. Mouth/Throat: No oropharyngeal exudate. Eyes: Pupils are equal, round, and reactive to light. Conjunctivae are normal. Right eye exhibits no discharge. Left eye exhibits no discharge. No scleral icterus. Neck: Normal range of motion. Neck supple. No JVD present. No tracheal deviation present. No thyromegaly present. Cardiovascular: Normal rate and normal heart sounds. Exam reveals no gallop. No murmur heard. Pulmonary/Chest: Effort normal and breath sounds normal. No stridor. No respiratory distress. She has no wheezes. She has no rales.  She exhibits no

## 2019-06-24 ENCOUNTER — CARE COORDINATION (OUTPATIENT)
Dept: CASE MANAGEMENT | Age: 84
End: 2019-06-24

## 2019-06-24 NOTE — CARE COORDINATION
[facility] Transitions Follow Up Call    2019    Patient: Camryn Service  Patient : 1935   MRN: [id number]  Reason for Admission: [facility]  Discharge Date: 19 RARS: Readmission Risk Score: 20         Spoke with: Message left. Stated who I was, representing the [facility] Care Transitions Team.    Care Transitions Subsequent and Final Call    Subsequent and Final Calls  Are you currently active with any services?:   [facility] Transitions Interventions     Other Services:   (Comment: [facility])   Other Interventions:        Care Transitions Episode Closed. CMRS:  2  Follow up appointments completed, medications reviewed.     Follow Up  Future Appointments   Date Time Provider Onel Bolton   2019  1:30 PM Jere Rubio MD [address]   2019  3:30 PM Concepción Black, PT [facility] PT [facility]   2019  2:50 PM MD SOLIS Cain RN

## 2019-06-28 ENCOUNTER — OFFICE VISIT (OUTPATIENT)
Dept: INTERNAL MEDICINE CLINIC | Age: 84
End: 2019-06-28
Payer: MEDICARE

## 2019-06-28 VITALS
BODY MASS INDEX: 26.81 KG/M2 | WEIGHT: 133 LBS | SYSTOLIC BLOOD PRESSURE: 124 MMHG | DIASTOLIC BLOOD PRESSURE: 76 MMHG | HEIGHT: 59 IN

## 2019-06-28 DIAGNOSIS — I50.32 CHRONIC DIASTOLIC CHF (CONGESTIVE HEART FAILURE), NYHA CLASS 1 (HCC): ICD-10-CM

## 2019-06-28 DIAGNOSIS — I10 BENIGN ESSENTIAL HTN: Primary | ICD-10-CM

## 2019-06-28 DIAGNOSIS — M05.79 RHEUMATOID ARTHRITIS INVOLVING MULTIPLE SITES WITH POSITIVE RHEUMATOID FACTOR (HCC): ICD-10-CM

## 2019-06-28 PROCEDURE — 1036F TOBACCO NON-USER: CPT | Performed by: INTERNAL MEDICINE

## 2019-06-28 PROCEDURE — 1111F DSCHRG MED/CURRENT MED MERGE: CPT | Performed by: INTERNAL MEDICINE

## 2019-06-28 PROCEDURE — G8427 DOCREV CUR MEDS BY ELIG CLIN: HCPCS | Performed by: INTERNAL MEDICINE

## 2019-06-28 PROCEDURE — 4040F PNEUMOC VAC/ADMIN/RCVD: CPT | Performed by: INTERNAL MEDICINE

## 2019-06-28 PROCEDURE — G8399 PT W/DXA RESULTS DOCUMENT: HCPCS | Performed by: INTERNAL MEDICINE

## 2019-06-28 PROCEDURE — 1123F ACP DISCUSS/DSCN MKR DOCD: CPT | Performed by: INTERNAL MEDICINE

## 2019-06-28 PROCEDURE — G8417 CALC BMI ABV UP PARAM F/U: HCPCS | Performed by: INTERNAL MEDICINE

## 2019-06-28 PROCEDURE — 99213 OFFICE O/P EST LOW 20 MIN: CPT | Performed by: INTERNAL MEDICINE

## 2019-06-28 PROCEDURE — 1090F PRES/ABSN URINE INCON ASSESS: CPT | Performed by: INTERNAL MEDICINE

## 2019-06-29 LAB — TSH SERPL DL<=0.05 MIU/L-ACNC: 3.21 UIU/ML (ref 0.49–4.67)

## 2019-07-01 RX ORDER — PANTOPRAZOLE SODIUM 40 MG/1
40 TABLET, DELAYED RELEASE ORAL
Qty: 90 TABLET | Refills: 3 | Status: SHIPPED | OUTPATIENT
Start: 2019-07-01 | End: 2020-04-28 | Stop reason: SDUPTHER

## 2019-07-01 RX ORDER — FUROSEMIDE 20 MG/1
20 TABLET ORAL DAILY PRN
Qty: 90 TABLET | Refills: 3 | Status: SHIPPED | OUTPATIENT
Start: 2019-07-01

## 2019-07-01 RX ORDER — METOPROLOL TARTRATE 50 MG/1
50 TABLET, FILM COATED ORAL 2 TIMES DAILY
Qty: 180 TABLET | Refills: 3 | Status: SHIPPED | OUTPATIENT
Start: 2019-07-01 | End: 2020-04-28 | Stop reason: SDUPTHER

## 2019-07-01 RX ORDER — LOSARTAN POTASSIUM 25 MG/1
25 TABLET ORAL DAILY
Qty: 90 TABLET | Refills: 3 | Status: SHIPPED | OUTPATIENT
Start: 2019-07-01 | End: 2020-04-28 | Stop reason: SDUPTHER

## 2019-07-02 ENCOUNTER — HOSPITAL ENCOUNTER (OUTPATIENT)
Dept: PHYSICAL THERAPY | Age: 84
Setting detail: THERAPIES SERIES
Discharge: HOME | End: 2019-07-02
Payer: MEDICARE

## 2019-07-02 DIAGNOSIS — S72.001D CLOSED FRACTURE OF RIGHT HIP WITH ROUTINE HEALING, SUBSEQUENT ENCOUNTER: Primary | ICD-10-CM

## 2019-07-02 PROCEDURE — 97161 PT EVAL LOW COMPLEX 20 MIN: CPT

## 2019-07-02 PROCEDURE — 97110 THERAPEUTIC EXERCISES: CPT

## 2019-07-02 NOTE — PROGRESS NOTES
mobility  Rolling to Left: Independent  Rolling to Right: Independent  Supine to Sit: Independent  Sit to Supine: Independent  Transfers  Sit to Stand: Independent  Stand to sit: Independent  Bed to Chair: Independent  Squat Pivot Transfers: Independent  Ambulation  Ambulation?: Yes  Ambulation 1  Surface: level tile  Device: Rolling Walker  Assistance: Independent  Gait Deviations: None  Comments: TUG 14 secs  Stairs/Curb  Stairs?: No     Exercises  Exercise 1: 3 way hip BLE holding on RW 10x  Exercise 2: nustep L2 9'  Exercise 3: Gait // bars walking F/L 3x  Exercise 4: tandem walk // bars 3x  Exercise 5: sit to stand 10x  Exercise 6: LAQ's/heel toe raises/hip flex 20x BLE    Assessment   Conditions Requiring Skilled Therapeutic Intervention  Body structures, Functions, Activity limitations: Decreased functional mobility ; Decreased ADL status; Decreased strength;Decreased balance  Assessment: R hip weakness limiting function  Treatment Diagnosis: weakness R hip M62.551 difficulty walking R26.2 NEC  Prognosis: Good  Decision Making: Low Complexity  History: fell at home 5/14/19 had surgery R hip 5/17/19  Exam: weak R hip unable to Gait using a SC  Clinical Presentation: TUG 14 secs  Patient Education: active ex R hip as inex sheet  Barriers to Learning: none  REQUIRES PT FOLLOW UP: Yes  Treatment Initiated : therapeutic ex R hip  Discharge Recommendations: Home with assist PRN  Activity Tolerance  Activity Tolerance: Patient Tolerated treatment well         Plan   Plan  Times per week: 2x/week  Plan weeks: 6 weeks  Specific instructions for Next Treatment: progress with ex as tolerated  Current Treatment Recommendations: Strengthening, Home Exercise Program, Gait Training, Stair training  Plan Comment: instructed in HEP    Goals  Short term goals  Time Frame for Short term goals: 6 visits  Short term goal 1: increase strength R hip by 1/2 grade  Short term goal 2: improve TUG from14 secs to 12 secs  Short term goal

## 2019-07-03 ENCOUNTER — TELEPHONE (OUTPATIENT)
Dept: INTERNAL MEDICINE CLINIC | Age: 84
End: 2019-07-03

## 2019-07-03 DIAGNOSIS — M05.79 RHEUMATOID ARTHRITIS INVOLVING MULTIPLE SITES WITH POSITIVE RHEUMATOID FACTOR (HCC): ICD-10-CM

## 2019-07-03 NOTE — TELEPHONE ENCOUNTER
Medication:  Percocet  Last Refill:  6/13/19  Next Appointment:  9/23/2019  Last Appointment:  6/13/19  Pharmacy: [facility] in [place]     No results found for: LABA1C  No results found for: [employer]

## 2019-07-08 ENCOUNTER — HOSPITAL ENCOUNTER (OUTPATIENT)
Dept: PHYSICAL THERAPY | Age: 84
Setting detail: THERAPIES SERIES
Discharge: HOME | End: 2019-07-08
Payer: MEDICARE

## 2019-07-08 DIAGNOSIS — S72.001D CLOSED FRACTURE OF RIGHT HIP WITH ROUTINE HEALING, SUBSEQUENT ENCOUNTER: Primary | ICD-10-CM

## 2019-07-08 PROCEDURE — 97110 THERAPEUTIC EXERCISES: CPT

## 2019-07-09 ENCOUNTER — OFFICE VISIT (OUTPATIENT)
Dept: ORTHOPEDIC SURGERY | Age: 84
End: 2019-07-09

## 2019-07-09 DIAGNOSIS — S72.001D CLOSED FRACTURE OF RIGHT HIP WITH ROUTINE HEALING, SUBSEQUENT ENCOUNTER: Primary | ICD-10-CM

## 2019-07-09 PROCEDURE — 99024 POSTOP FOLLOW-UP VISIT: CPT | Performed by: ORTHOPAEDIC SURGERY

## 2019-07-11 ENCOUNTER — HOSPITAL ENCOUNTER (OUTPATIENT)
Dept: PHYSICAL THERAPY | Age: 84
Setting detail: THERAPIES SERIES
Discharge: HOME | End: 2019-07-11
Payer: MEDICARE

## 2019-07-11 PROCEDURE — 97110 THERAPEUTIC EXERCISES: CPT

## 2019-07-11 NOTE — PROGRESS NOTES
Physical Therapy  Daily Treatment Note  Date: 2019  Patient Name: Sabra Sandoval  MRN: [id number]     :   1935    Subjective:      PT Visit Information  Onset Date: 19  PT Insurance Information: medicare/[employer]  Total # of Visits Approved: 12  Total # of Visits to Date: 3  Subjective  Subjective: no complains of pain on R hip today  Pain Screening  Patient Currently in Pain: No  Vital Signs  Patient Currently in Pain: No       Treatment Activities:   Exercises  Exercise 1: 3 way hip/knee flex/marching RLE 2# holding on RW 10x  Exercise 2: nustep L1 10'  Exercise 3: Gait // bars using 2 SC 2x  Exercise 5: sit to stand 10x  Exercise 6: L S/L clamshell R 20x  Exercise 7: bridging 20x10\"  Exercise 8: R hip flex 20x supine  Exercise 9: RLE heel slide supine 20x  Exercise 10: R LAQ's 2# 20x     Assessment:   Conditions Requiring Skilled Therapeutic Intervention  Assessment: progress with ex  REQUIRES PT FOLLOW UP: Yes  Discharge Recommendations: Home with assist PRN     Plan:    Plan  Times per week: 2x/week  Current Treatment Recommendations: Strengthening, Home Exercise Program, Gait Training, Stair training  Plan Comment: to continue PT per POC  Timed Code Treatment Minutes: 30 Minutes   Treatment Charges: Minutes Units   []  Ultrasound     []  Electrical-Stim     []  Iontophoresis     []  Traction     []  Massage       []  Eval     []  Gait     [x]  Ther Exercise 30  2    []  Manual Therapy       []  Ther Activities       []  Aquatics     []  Vasopneumatic Device     []  Neuro Re-Ed       []  Other       Total Treatment Time: 30 2        Therapy Time   Individual Concurrent Group Co-treatment   Time In 1455         Time Out 1525         Minutes 30         Timed Code Treatment Minutes: 30 Minutes     Electronically signed by: Courtney Yost, PT

## 2019-07-12 RX ORDER — OXYCODONE HYDROCHLORIDE AND ACETAMINOPHEN 5; 325 MG/1; MG/1
1 TABLET ORAL EVERY 8 HOURS PRN
Qty: 90 TABLET | Refills: 0 | Status: SHIPPED | OUTPATIENT
Start: 2019-07-12 | End: 2019-08-07 | Stop reason: SDUPTHER

## 2019-07-16 ENCOUNTER — HOSPITAL ENCOUNTER (OUTPATIENT)
Dept: PHYSICAL THERAPY | Age: 84
Setting detail: THERAPIES SERIES
Discharge: HOME | End: 2019-07-16
Payer: MEDICARE

## 2019-07-16 PROCEDURE — 97110 THERAPEUTIC EXERCISES: CPT

## 2019-07-16 ASSESSMENT — PAIN SCALES - GENERAL: PAINLEVEL_OUTOF10: 6

## 2019-07-16 ASSESSMENT — PAIN DESCRIPTION - ORIENTATION: ORIENTATION: LOWER

## 2019-07-16 ASSESSMENT — PAIN DESCRIPTION - LOCATION: LOCATION: BACK

## 2019-07-16 ASSESSMENT — PAIN DESCRIPTION - DESCRIPTORS: DESCRIPTORS: CONSTANT

## 2019-07-16 ASSESSMENT — PAIN DESCRIPTION - FREQUENCY: FREQUENCY: CONTINUOUS

## 2019-07-18 ENCOUNTER — HOSPITAL ENCOUNTER (OUTPATIENT)
Dept: PHYSICAL THERAPY | Age: 84
Setting detail: THERAPIES SERIES
Discharge: HOME | End: 2019-07-18
Payer: MEDICARE

## 2019-07-19 ENCOUNTER — APPOINTMENT (OUTPATIENT)
Dept: PHYSICAL THERAPY | Age: 84
End: 2019-07-19
Payer: MEDICARE

## 2019-07-23 ENCOUNTER — HOSPITAL ENCOUNTER (OUTPATIENT)
Dept: PHYSICAL THERAPY | Age: 84
Setting detail: THERAPIES SERIES
Discharge: HOME | End: 2019-07-23
Payer: MEDICARE

## 2019-07-23 PROCEDURE — 97110 THERAPEUTIC EXERCISES: CPT

## 2019-07-23 ASSESSMENT — PAIN DESCRIPTION - LOCATION: LOCATION: BACK

## 2019-07-23 ASSESSMENT — PAIN SCALES - GENERAL: PAINLEVEL_OUTOF10: 5

## 2019-07-23 ASSESSMENT — PAIN DESCRIPTION - DESCRIPTORS: DESCRIPTORS: CONSTANT

## 2019-07-23 ASSESSMENT — PAIN DESCRIPTION - ORIENTATION: ORIENTATION: LOWER

## 2019-07-23 ASSESSMENT — PAIN DESCRIPTION - FREQUENCY: FREQUENCY: CONTINUOUS

## 2019-07-23 NOTE — PROGRESS NOTES
[address]   Outpatient Physical Therapy  [address] [facility]  Phone: [phone]  Fax: [phone]  Daily Progress Note    Date: 19    Patient Name: Alyssa Bone        MRN: [id number]  Account: [de-identified] : 1935      General Information:  Referring Practitioner: Dr Clair Bowen  Referral Date : 19  Diagnosis: Fracture R hip  Follows Commands: Within Functional Limits  Onset Date: 19  PT Insurance Information: medicare/MMO  Total # of Visits Approved: 12  Total # of Visits to Date: 5  No Show: 0  Canceled Appointment: 1    Subjective:  Subjective: Patient reports most pain in (B) knees this date but also lower back      Pain:  Patient Currently in Pain: Yes  Pain Assessment: 0-10  Pain Level: 5  Pain Location: Back  Pain Orientation: Lower  Pain Descriptors: Constant  Pain Frequency: Continuous       Objective:  Exercise 1: 3 way hip/knee flex/marching/heel toe raises BLE 2# holding on RW 10x  Exercise 2: nustep L1 10'  Exercise 3: Gait training using 2 SC 60ft  Exercise 5: sit to stand 10x  Exercise 6: L S/L clamshell R 20x  Exercise 7: bridging 20x  Exercise 8: B hip flex 20x supine  Exercise 10: B LAQ's/SAQ's 2# 20x    Assessment: Body structures, Functions, Activity limitations: Decreased functional mobility ; Decreased ADL status; Decreased strength;Decreased balance  Treatment Diagnosis: weakness R hip M62.551 difficulty walking R26.2 NEC  Activity Tolerance: Patient limited by pain    Plan:  Plan: Continue with current plan    Therapy Time:  Time In: 1530  Time Out: 1630    Treatment Charges: Minutes Units   []  Ultrasound     []  Electrical-Stim     []  Iontophoresis     []  Traction     []  Massage       []  Eval     []  Gait     []  Vasopneumatic Device     [x]  Ther Exercise 35  2   []  Manual Therapy       []  Ther Activities       []  Aquatics     []  Neuro Re-Ed       []  Other       Total Treatment Time: 28 2       Mahesh Hooper PTA

## 2019-07-25 ENCOUNTER — HOSPITAL ENCOUNTER (OUTPATIENT)
Dept: PHYSICAL THERAPY | Age: 84
Setting detail: THERAPIES SERIES
Discharge: HOME | End: 2019-07-25
Payer: MEDICARE

## 2019-07-25 PROCEDURE — 97110 THERAPEUTIC EXERCISES: CPT

## 2019-07-30 ENCOUNTER — HOSPITAL ENCOUNTER (OUTPATIENT)
Dept: PHYSICAL THERAPY | Age: 84
Setting detail: THERAPIES SERIES
Discharge: HOME | End: 2019-07-30
Payer: MEDICARE

## 2019-07-30 PROCEDURE — 97110 THERAPEUTIC EXERCISES: CPT

## 2019-07-30 ASSESSMENT — PAIN DESCRIPTION - DESCRIPTORS: DESCRIPTORS: CONSTANT

## 2019-07-30 ASSESSMENT — PAIN DESCRIPTION - FREQUENCY: FREQUENCY: CONTINUOUS

## 2019-07-30 ASSESSMENT — PAIN DESCRIPTION - ORIENTATION: ORIENTATION: RIGHT;LEFT;LOWER

## 2019-07-30 ASSESSMENT — PAIN DESCRIPTION - LOCATION: LOCATION: BACK;LEG

## 2019-07-30 ASSESSMENT — PAIN SCALES - GENERAL: PAINLEVEL_OUTOF10: 9

## 2019-08-01 ENCOUNTER — HOSPITAL ENCOUNTER (OUTPATIENT)
Dept: PHYSICAL THERAPY | Age: 84
Setting detail: THERAPIES SERIES
Discharge: HOME | End: 2019-08-01
Payer: MEDICARE

## 2019-08-01 PROCEDURE — 97110 THERAPEUTIC EXERCISES: CPT

## 2019-08-06 ENCOUNTER — HOSPITAL ENCOUNTER (OUTPATIENT)
Dept: PHYSICAL THERAPY | Age: 84
Setting detail: THERAPIES SERIES
Discharge: HOME | End: 2019-08-06
Payer: MEDICARE

## 2019-08-06 DIAGNOSIS — M05.79 RHEUMATOID ARTHRITIS INVOLVING MULTIPLE SITES WITH POSITIVE RHEUMATOID FACTOR (HCC): ICD-10-CM

## 2019-08-06 PROCEDURE — 97110 THERAPEUTIC EXERCISES: CPT

## 2019-08-06 ASSESSMENT — PAIN DESCRIPTION - FREQUENCY: FREQUENCY: CONTINUOUS

## 2019-08-06 ASSESSMENT — PAIN SCALES - GENERAL: PAINLEVEL_OUTOF10: 10

## 2019-08-06 ASSESSMENT — PAIN DESCRIPTION - LOCATION: LOCATION: BACK;HIP

## 2019-08-06 ASSESSMENT — PAIN DESCRIPTION - DESCRIPTORS: DESCRIPTORS: CONSTANT

## 2019-08-06 ASSESSMENT — PAIN DESCRIPTION - ORIENTATION: ORIENTATION: RIGHT;LOWER

## 2019-08-07 RX ORDER — OXYCODONE HYDROCHLORIDE AND ACETAMINOPHEN 5; 325 MG/1; MG/1
1 TABLET ORAL EVERY 8 HOURS PRN
Qty: 90 TABLET | Refills: 0 | Status: SHIPPED | OUTPATIENT
Start: 2019-08-07 | End: 2019-09-04 | Stop reason: SDUPTHER

## 2019-08-08 ENCOUNTER — HOSPITAL ENCOUNTER (OUTPATIENT)
Dept: PHYSICAL THERAPY | Age: 84
Setting detail: THERAPIES SERIES
Discharge: HOME | End: 2019-08-08
Payer: MEDICARE

## 2019-08-08 PROCEDURE — 97110 THERAPEUTIC EXERCISES: CPT

## 2019-08-08 ASSESSMENT — PAIN DESCRIPTION - DESCRIPTORS: DESCRIPTORS: CONSTANT

## 2019-08-08 ASSESSMENT — PAIN SCALES - GENERAL: PAINLEVEL_OUTOF10: 6

## 2019-08-08 ASSESSMENT — PAIN DESCRIPTION - FREQUENCY: FREQUENCY: CONTINUOUS

## 2019-08-13 ENCOUNTER — HOSPITAL ENCOUNTER (OUTPATIENT)
Dept: PHYSICAL THERAPY | Age: 84
Setting detail: THERAPIES SERIES
Discharge: HOME | End: 2019-08-13
Payer: MEDICARE

## 2019-08-13 PROCEDURE — 97110 THERAPEUTIC EXERCISES: CPT

## 2019-08-15 ENCOUNTER — HOSPITAL ENCOUNTER (OUTPATIENT)
Dept: PHYSICAL THERAPY | Age: 84
Setting detail: THERAPIES SERIES
Discharge: HOME | End: 2019-08-15
Payer: MEDICARE

## 2019-08-15 PROCEDURE — 97110 THERAPEUTIC EXERCISES: CPT

## 2019-08-15 ASSESSMENT — PAIN DESCRIPTION - FREQUENCY: FREQUENCY: CONTINUOUS

## 2019-08-15 ASSESSMENT — PAIN DESCRIPTION - DESCRIPTORS: DESCRIPTORS: CONSTANT

## 2019-08-15 ASSESSMENT — PAIN DESCRIPTION - LOCATION: LOCATION: HIP

## 2019-08-15 ASSESSMENT — PAIN DESCRIPTION - ORIENTATION: ORIENTATION: RIGHT

## 2019-08-16 ENCOUNTER — OFFICE VISIT (OUTPATIENT)
Dept: INTERNAL MEDICINE CLINIC | Age: 84
End: 2019-08-16
Payer: MEDICARE

## 2019-08-16 VITALS
WEIGHT: 138 LBS | DIASTOLIC BLOOD PRESSURE: 72 MMHG | HEIGHT: 59 IN | SYSTOLIC BLOOD PRESSURE: 112 MMHG | BODY MASS INDEX: 27.82 KG/M2

## 2019-08-16 DIAGNOSIS — M05.79 RHEUMATOID ARTHRITIS INVOLVING MULTIPLE SITES WITH POSITIVE RHEUMATOID FACTOR (HCC): Primary | ICD-10-CM

## 2019-08-16 PROCEDURE — G8399 PT W/DXA RESULTS DOCUMENT: HCPCS | Performed by: INTERNAL MEDICINE

## 2019-08-16 PROCEDURE — G8427 DOCREV CUR MEDS BY ELIG CLIN: HCPCS | Performed by: INTERNAL MEDICINE

## 2019-08-16 PROCEDURE — 1090F PRES/ABSN URINE INCON ASSESS: CPT | Performed by: INTERNAL MEDICINE

## 2019-08-16 PROCEDURE — 4040F PNEUMOC VAC/ADMIN/RCVD: CPT | Performed by: INTERNAL MEDICINE

## 2019-08-16 PROCEDURE — 99213 OFFICE O/P EST LOW 20 MIN: CPT | Performed by: INTERNAL MEDICINE

## 2019-08-16 PROCEDURE — 1123F ACP DISCUSS/DSCN MKR DOCD: CPT | Performed by: INTERNAL MEDICINE

## 2019-08-16 PROCEDURE — 1036F TOBACCO NON-USER: CPT | Performed by: INTERNAL MEDICINE

## 2019-08-16 PROCEDURE — G8417 CALC BMI ABV UP PARAM F/U: HCPCS | Performed by: INTERNAL MEDICINE

## 2019-08-19 ENCOUNTER — TELEPHONE (OUTPATIENT)
Dept: INTERNAL MEDICINE CLINIC | Age: 84
End: 2019-08-19

## 2019-08-19 NOTE — TELEPHONE ENCOUNTER
Is she supposed to get her Prolia injection at [facility] [facility]    Patient states she was due for this back in may.

## 2019-08-20 RX ORDER — METHYLPREDNISOLONE SODIUM SUCCINATE 125 MG/2ML
125 INJECTION, POWDER, LYOPHILIZED, FOR SOLUTION INTRAMUSCULAR; INTRAVENOUS ONCE
Status: CANCELLED | OUTPATIENT
Start: 2019-08-20

## 2019-08-20 RX ORDER — DIPHENHYDRAMINE HYDROCHLORIDE 50 MG/ML
50 INJECTION INTRAMUSCULAR; INTRAVENOUS ONCE
Status: CANCELLED | OUTPATIENT
Start: 2019-08-20

## 2019-08-20 RX ORDER — SODIUM CHLORIDE 9 MG/ML
INJECTION, SOLUTION INTRAVENOUS CONTINUOUS
Status: CANCELLED | OUTPATIENT
Start: 2019-08-20

## 2019-08-20 RX ORDER — EPINEPHRINE 1 MG/ML
0.3 INJECTION, SOLUTION, CONCENTRATE INTRAVENOUS PRN
Status: CANCELLED | OUTPATIENT
Start: 2019-08-20

## 2019-08-22 ENCOUNTER — HOSPITAL ENCOUNTER (OUTPATIENT)
Dept: PHYSICAL THERAPY | Age: 84
Setting detail: THERAPIES SERIES
Discharge: HOME | End: 2019-08-22
Payer: MEDICARE

## 2019-08-22 PROCEDURE — 97110 THERAPEUTIC EXERCISES: CPT

## 2019-08-22 ASSESSMENT — PAIN DESCRIPTION - FREQUENCY: FREQUENCY: CONTINUOUS

## 2019-08-22 ASSESSMENT — PAIN SCALES - GENERAL: PAINLEVEL_OUTOF10: 10

## 2019-08-22 ASSESSMENT — PAIN DESCRIPTION - LOCATION: LOCATION: HIP

## 2019-08-22 ASSESSMENT — PAIN DESCRIPTION - ORIENTATION: ORIENTATION: RIGHT

## 2019-08-22 ASSESSMENT — PAIN DESCRIPTION - DESCRIPTORS: DESCRIPTORS: CONSTANT

## 2019-08-23 ENCOUNTER — TELEPHONE (OUTPATIENT)
Dept: INFUSION THERAPY | Age: 84
End: 2019-08-23

## 2019-08-23 DIAGNOSIS — M81.0 POSTMENOPAUSAL OSTEOPOROSIS: Primary | ICD-10-CM

## 2019-08-27 ENCOUNTER — HOSPITAL ENCOUNTER (OUTPATIENT)
Age: 84
Setting detail: SPECIMEN
Discharge: HOME | End: 2019-08-27
Payer: MEDICARE

## 2019-08-27 ENCOUNTER — HOSPITAL ENCOUNTER (OUTPATIENT)
Dept: PHYSICAL THERAPY | Age: 84
Setting detail: THERAPIES SERIES
Discharge: HOME | End: 2019-08-27
Payer: MEDICARE

## 2019-08-27 DIAGNOSIS — M81.0 POSTMENOPAUSAL OSTEOPOROSIS: ICD-10-CM

## 2019-08-27 LAB
ANION GAP SERPL CALCULATED.3IONS-SCNC: 14 MMOL/L (ref 9–17)
BUN BLDV-MCNC: 20 MG/DL (ref 8–23)
BUN/CREAT BLD: NORMAL (ref 9–20)
CALCIUM SERPL-MCNC: 9.6 MG/DL (ref 8.6–10.4)
CHLORIDE BLD-SCNC: 100 MMOL/L (ref 98–107)
CO2: 28 MMOL/L (ref 20–31)
CREAT SERPL-MCNC: 0.57 MG/DL (ref 0.5–0.9)
GFR AFRICAN AMERICAN: >60 ML/MIN
GFR NON-AFRICAN AMERICAN: >60 ML/MIN
GFR SERPL CREATININE-BSD FRML MDRD: NORMAL ML/MIN/{1.73_M2}
GFR SERPL CREATININE-BSD FRML MDRD: NORMAL ML/MIN/{1.73_M2}
GLUCOSE BLD-MCNC: 96 MG/DL (ref 70–99)
MAGNESIUM: 1.7 MG/DL (ref 1.6–2.6)
PHOSPHORUS: 3.7 MG/DL (ref 2.6–4.5)
POTASSIUM SERPL-SCNC: 4.1 MMOL/L (ref 3.7–5.3)
SODIUM BLD-SCNC: 142 MMOL/L (ref 135–144)

## 2019-08-27 PROCEDURE — 97110 THERAPEUTIC EXERCISES: CPT

## 2019-08-27 ASSESSMENT — PAIN DESCRIPTION - ORIENTATION: ORIENTATION: RIGHT

## 2019-08-27 ASSESSMENT — PAIN DESCRIPTION - DESCRIPTORS: DESCRIPTORS: CONSTANT

## 2019-08-27 ASSESSMENT — PAIN DESCRIPTION - LOCATION: LOCATION: HIP

## 2019-08-27 ASSESSMENT — PAIN SCALES - GENERAL: PAINLEVEL_OUTOF10: 8

## 2019-08-27 ASSESSMENT — PAIN DESCRIPTION - FREQUENCY: FREQUENCY: CONTINUOUS

## 2019-08-28 ENCOUNTER — HOSPITAL ENCOUNTER (OUTPATIENT)
Dept: INPATIENT UNIT | Age: 84
Setting detail: THERAPIES SERIES
Discharge: HOME | End: 2019-08-28
Payer: MEDICARE

## 2019-08-28 VITALS
DIASTOLIC BLOOD PRESSURE: 82 MMHG | TEMPERATURE: 98.2 F | SYSTOLIC BLOOD PRESSURE: 162 MMHG | RESPIRATION RATE: 18 BRPM | OXYGEN SATURATION: 96 % | HEART RATE: 97 BPM

## 2019-08-28 DIAGNOSIS — M81.0 OSTEOPOROSIS, UNSPECIFIED OSTEOPOROSIS TYPE, UNSPECIFIED PATHOLOGICAL FRACTURE PRESENCE: Primary | ICD-10-CM

## 2019-08-28 PROCEDURE — 96372 THER/PROPH/DIAG INJ SC/IM: CPT

## 2019-08-28 PROCEDURE — 6360000002 HC RX W HCPCS: Performed by: INTERNAL MEDICINE

## 2019-08-28 RX ORDER — EPINEPHRINE 1 MG/ML
0.3 INJECTION, SOLUTION, CONCENTRATE INTRAVENOUS PRN
Status: CANCELLED | OUTPATIENT
Start: 2020-02-26

## 2019-08-28 RX ORDER — METHYLPREDNISOLONE SODIUM SUCCINATE 125 MG/2ML
125 INJECTION, POWDER, LYOPHILIZED, FOR SOLUTION INTRAMUSCULAR; INTRAVENOUS ONCE
Status: CANCELLED | OUTPATIENT
Start: 2020-02-26

## 2019-08-28 RX ORDER — DIPHENHYDRAMINE HYDROCHLORIDE 50 MG/ML
50 INJECTION INTRAMUSCULAR; INTRAVENOUS ONCE
Status: CANCELLED | OUTPATIENT
Start: 2020-02-26

## 2019-08-28 RX ORDER — SODIUM CHLORIDE 9 MG/ML
INJECTION, SOLUTION INTRAVENOUS CONTINUOUS
Status: CANCELLED | OUTPATIENT
Start: 2020-02-26

## 2019-08-28 RX ADMIN — DENOSUMAB 60 MG: 60 INJECTION SUBCUTANEOUS at 15:59

## 2019-08-28 NOTE — PROGRESS NOTES
Patient arrives for prolia injection, vitals obtained, labs resulted WNL on 8/27/19, handout was given to pt and possible side effects were explained pt states understanding. prolia given to right arm, pt tolerated well, pt was monitored for approx 30 minutes then discharged ambulatory with her spouse.

## 2019-08-29 ENCOUNTER — APPOINTMENT (OUTPATIENT)
Dept: PHYSICAL THERAPY | Age: 84
End: 2019-08-29
Payer: MEDICARE

## 2019-08-30 ENCOUNTER — HOSPITAL ENCOUNTER (OUTPATIENT)
Dept: PHYSICAL THERAPY | Age: 84
Setting detail: THERAPIES SERIES
Discharge: HOME | End: 2019-08-30
Payer: MEDICARE

## 2019-08-30 PROCEDURE — 97110 THERAPEUTIC EXERCISES: CPT

## 2019-08-30 ASSESSMENT — PAIN DESCRIPTION - DESCRIPTORS: DESCRIPTORS: CONSTANT

## 2019-08-30 ASSESSMENT — PAIN DESCRIPTION - FREQUENCY: FREQUENCY: CONTINUOUS

## 2019-08-30 ASSESSMENT — PAIN SCALES - GENERAL: PAINLEVEL_OUTOF10: 10

## 2019-09-04 DIAGNOSIS — M05.79 RHEUMATOID ARTHRITIS INVOLVING MULTIPLE SITES WITH POSITIVE RHEUMATOID FACTOR (HCC): ICD-10-CM

## 2019-09-04 DIAGNOSIS — Z51.81 ENCOUNTER FOR MEDICATION MONITORING: Primary | ICD-10-CM

## 2019-09-04 NOTE — TELEPHONE ENCOUNTER
Medication Requested: OXYCODONE     Last visit: 2019  Next visit: 2019  Last refill 2019    Med contract on file:  [x] yes   [] no    Last urine drug screen:  WILL ORDER UDS AND PEND  Consistent with medication(s):    [] yes   [] no    Last OARRS ran: ?   Quantity of medication remainin    Who will be picking rx up: SELF    Pharmacy if escribed: [facility]

## 2019-09-05 ENCOUNTER — HOSPITAL ENCOUNTER (OUTPATIENT)
Dept: PHYSICAL THERAPY | Age: 84
Setting detail: THERAPIES SERIES
Discharge: HOME | End: 2019-09-05
Payer: MEDICARE

## 2019-09-05 PROCEDURE — 97110 THERAPEUTIC EXERCISES: CPT

## 2019-09-05 ASSESSMENT — PAIN DESCRIPTION - FREQUENCY: FREQUENCY: CONTINUOUS

## 2019-09-05 ASSESSMENT — PAIN DESCRIPTION - LOCATION: LOCATION: BACK;LEG

## 2019-09-05 ASSESSMENT — PAIN SCALES - GENERAL: PAINLEVEL_OUTOF10: 10

## 2019-09-05 ASSESSMENT — PAIN DESCRIPTION - ORIENTATION: ORIENTATION: RIGHT;LOWER

## 2019-09-05 ASSESSMENT — PAIN DESCRIPTION - DESCRIPTORS: DESCRIPTORS: CONSTANT

## 2019-09-06 RX ORDER — OXYCODONE HYDROCHLORIDE AND ACETAMINOPHEN 5; 325 MG/1; MG/1
1 TABLET ORAL EVERY 8 HOURS PRN
Qty: 90 TABLET | Refills: 0 | Status: SHIPPED | OUTPATIENT
Start: 2019-09-06 | End: 2019-10-02 | Stop reason: SDUPTHER

## 2019-09-06 NOTE — TELEPHONE ENCOUNTER
Patient called again and is not happy. She has been taking fermin many tylenol its causing diarrhea and vomiting.

## 2019-09-10 ENCOUNTER — HOSPITAL ENCOUNTER (OUTPATIENT)
Dept: PHYSICAL THERAPY | Age: 84
Setting detail: THERAPIES SERIES
Discharge: HOME | End: 2019-09-10
Payer: MEDICARE

## 2019-09-10 PROCEDURE — 97110 THERAPEUTIC EXERCISES: CPT

## 2019-09-10 ASSESSMENT — PAIN DESCRIPTION - ORIENTATION: ORIENTATION: LOWER;RIGHT;LEFT

## 2019-09-10 ASSESSMENT — PAIN SCALES - GENERAL: PAINLEVEL_OUTOF10: 10

## 2019-09-10 ASSESSMENT — PAIN DESCRIPTION - FREQUENCY: FREQUENCY: CONTINUOUS

## 2019-09-10 ASSESSMENT — PAIN DESCRIPTION - LOCATION: LOCATION: BACK;KNEE

## 2019-09-10 ASSESSMENT — PAIN DESCRIPTION - DESCRIPTORS: DESCRIPTORS: CONSTANT

## 2019-09-10 NOTE — PROGRESS NOTES
Physical Therapy  Daily Treatment Note  Date: 9/10/2019  Patient Name: Juaan Lunsford  MRN: [id number]     :   1935    Subjective:      PT Visit Information  Onset Date: 19  PT Insurance Information: medicare/[employer]  Total # of Visits Approved: 20  Total # of Visits to Date: 17  Subjective  Subjective: complains of pain on both knees and low back today  Pain Screening  Patient Currently in Pain: Yes  Pain Assessment  Pain Assessment: 0-10  Pain Level: 10  Pain Location: Back;Knee  Pain Orientation: Lower;Right;Left  Pain Descriptors: Constant  Pain Frequency: Continuous  Vital Signs  Patient Currently in Pain: Yes       Treatment Activities:   Exercises  Exercise 1: 3 way hip/knee flex/marching/heel toe raises BLE 0# holding on RW 20x  Exercise 2: nustep L1 20'  Exercise 5: sit to stand 10x  Exercise 6: L S/L clamshell R 20x  Exercise 7: bridging 20x  Exercise 8: B hip flex 20x supine  Exercise 9: BLE heel slide supine 20x  Exercise 10:  B LAQ's/SAQ's 0# 20x     Assessment:   Conditions Requiring Skilled Therapeutic Intervention  REQUIRES PT FOLLOW UP: Yes  Discharge Recommendations: Home with assist PRN     Plan:    Plan  Times per week: 2x/week  Current Treatment Recommendations: Strengthening, Home Exercise Program, Gait Training, Stair training  Plan Comment: to continue PT 2x/week as ordered  Timed Code Treatment Minutes: 30 Minutes   Treatment Charges: Minutes Units   []  Ultrasound     []  Electrical-Stim     []  Iontophoresis     []  Traction     []  Massage       []  Eval     []  Gait     [x]  Ther Exercise 30  2    []  Manual Therapy       []  Ther Activities       []  Aquatics     []  Vasopneumatic Device     []  Neuro Re-Ed       []  Other       Total Treatment Time: 30 2        Therapy Time   Individual Concurrent Group Co-treatment   Time In 0         Time Out 1600         Minutes 30         Timed Code Treatment Minutes: 30 Minutes     Electronically signed by: Dai Canales, PT

## 2019-09-12 ENCOUNTER — APPOINTMENT (OUTPATIENT)
Dept: PHYSICAL THERAPY | Age: 84
End: 2019-09-12
Payer: MEDICARE

## 2019-09-13 ENCOUNTER — HOSPITAL ENCOUNTER (OUTPATIENT)
Dept: PHYSICAL THERAPY | Age: 84
Setting detail: THERAPIES SERIES
Discharge: HOME | End: 2019-09-13
Payer: MEDICARE

## 2019-09-13 PROCEDURE — 97110 THERAPEUTIC EXERCISES: CPT

## 2019-09-17 ENCOUNTER — HOSPITAL ENCOUNTER (OUTPATIENT)
Dept: PHYSICAL THERAPY | Age: 84
Setting detail: THERAPIES SERIES
Discharge: HOME | End: 2019-09-17
Payer: MEDICARE

## 2019-09-17 PROCEDURE — 97110 THERAPEUTIC EXERCISES: CPT

## 2019-09-19 ENCOUNTER — HOSPITAL ENCOUNTER (OUTPATIENT)
Dept: PHYSICAL THERAPY | Age: 84
Setting detail: THERAPIES SERIES
Discharge: HOME | End: 2019-09-19
Payer: MEDICARE

## 2019-09-19 PROCEDURE — 97110 THERAPEUTIC EXERCISES: CPT

## 2019-09-19 ASSESSMENT — PAIN DESCRIPTION - ORIENTATION: ORIENTATION: RIGHT

## 2019-09-19 ASSESSMENT — PAIN SCALES - GENERAL: PAINLEVEL_OUTOF10: 6

## 2019-09-19 ASSESSMENT — PAIN DESCRIPTION - FREQUENCY: FREQUENCY: INTERMITTENT

## 2019-09-19 ASSESSMENT — PAIN DESCRIPTION - LOCATION: LOCATION: KNEE

## 2019-09-19 NOTE — PROGRESS NOTES
met)  Short term goal 4: indep with HEP(met)  Long term goals  Long term goal 1: improve TUG to 8-10 secs to prevent falls(not met)  Long term goal 2: Gait using a SC indep(met able to Gait short distance at home using SC)     Treatment Charges: Minutes Units   []  Ultrasound     []  Electrical-Stim     []  Iontophoresis     []  Traction     []  Massage       []  Eval     []  Gait     [x]  Ther Exercise 35  2    []  Manual Therapy       []  Ther Activities       []  Aquatics     []  Vasopneumatic Device     []  Neuro Re-Ed       []  Other       Total Treatment Time: 35 2        Therapy Time   Individual Concurrent Group Co-treatment   Time In 1520         Time Out 1555         Minutes 35         Timed Code Treatment Minutes: 35 Minutes     Electronically signed by: Edd Manual, PT

## 2019-09-23 ENCOUNTER — OFFICE VISIT (OUTPATIENT)
Dept: INTERNAL MEDICINE CLINIC | Age: 84
End: 2019-09-23
Payer: MEDICARE

## 2019-09-23 VITALS
WEIGHT: 144 LBS | DIASTOLIC BLOOD PRESSURE: 102 MMHG | SYSTOLIC BLOOD PRESSURE: 176 MMHG | HEIGHT: 59 IN | BODY MASS INDEX: 29.03 KG/M2

## 2019-09-23 DIAGNOSIS — M25.561 ACUTE PAIN OF BOTH KNEES: Primary | ICD-10-CM

## 2019-09-23 DIAGNOSIS — M25.562 ACUTE PAIN OF BOTH KNEES: Primary | ICD-10-CM

## 2019-09-23 PROCEDURE — G8427 DOCREV CUR MEDS BY ELIG CLIN: HCPCS | Performed by: INTERNAL MEDICINE

## 2019-09-23 PROCEDURE — 4040F PNEUMOC VAC/ADMIN/RCVD: CPT | Performed by: INTERNAL MEDICINE

## 2019-09-23 PROCEDURE — G8417 CALC BMI ABV UP PARAM F/U: HCPCS | Performed by: INTERNAL MEDICINE

## 2019-09-23 PROCEDURE — 99213 OFFICE O/P EST LOW 20 MIN: CPT | Performed by: INTERNAL MEDICINE

## 2019-09-23 PROCEDURE — 1123F ACP DISCUSS/DSCN MKR DOCD: CPT | Performed by: INTERNAL MEDICINE

## 2019-09-23 PROCEDURE — G8399 PT W/DXA RESULTS DOCUMENT: HCPCS | Performed by: INTERNAL MEDICINE

## 2019-09-23 PROCEDURE — 1090F PRES/ABSN URINE INCON ASSESS: CPT | Performed by: INTERNAL MEDICINE

## 2019-09-23 PROCEDURE — 1036F TOBACCO NON-USER: CPT | Performed by: INTERNAL MEDICINE

## 2019-09-23 RX ORDER — DIPHENHYDRAMINE HYDROCHLORIDE 50 MG/ML
50 INJECTION INTRAMUSCULAR; INTRAVENOUS ONCE
Status: CANCELLED | OUTPATIENT
Start: 2019-09-23

## 2019-09-23 RX ORDER — PREDNISONE 20 MG/1
20 TABLET ORAL DAILY
Qty: 7 TABLET | Refills: 0 | Status: SHIPPED | OUTPATIENT
Start: 2019-09-23 | End: 2019-09-30

## 2019-09-23 RX ORDER — EPINEPHRINE 1 MG/ML
0.3 INJECTION, SOLUTION, CONCENTRATE INTRAVENOUS PRN
Status: CANCELLED | OUTPATIENT
Start: 2019-09-23

## 2019-09-23 RX ORDER — METHYLPREDNISOLONE SODIUM SUCCINATE 125 MG/2ML
125 INJECTION, POWDER, LYOPHILIZED, FOR SOLUTION INTRAMUSCULAR; INTRAVENOUS ONCE
Status: CANCELLED | OUTPATIENT
Start: 2019-09-23

## 2019-09-23 RX ORDER — SODIUM CHLORIDE 9 MG/ML
INJECTION, SOLUTION INTRAVENOUS CONTINUOUS
Status: CANCELLED | OUTPATIENT
Start: 2019-09-23

## 2019-09-27 ENCOUNTER — TELEPHONE (OUTPATIENT)
Dept: INTERNAL MEDICINE CLINIC | Age: 84
End: 2019-09-27

## 2019-09-27 NOTE — TELEPHONE ENCOUNTER
Patient was told to call Dr Joya Plummer if the prednisone was not giving her relief for her knee pain, would like to try injection ?   Please advise

## 2019-10-02 ENCOUNTER — OFFICE VISIT (OUTPATIENT)
Dept: INTERNAL MEDICINE CLINIC | Age: 84
End: 2019-10-02
Payer: MEDICARE

## 2019-10-02 VITALS — HEIGHT: 59 IN | WEIGHT: 143.96 LBS | BODY MASS INDEX: 29.02 KG/M2

## 2019-10-02 DIAGNOSIS — M05.79 RHEUMATOID ARTHRITIS INVOLVING MULTIPLE SITES WITH POSITIVE RHEUMATOID FACTOR (HCC): ICD-10-CM

## 2019-10-02 PROCEDURE — G8484 FLU IMMUNIZE NO ADMIN: HCPCS | Performed by: INTERNAL MEDICINE

## 2019-10-02 PROCEDURE — 1036F TOBACCO NON-USER: CPT | Performed by: INTERNAL MEDICINE

## 2019-10-02 PROCEDURE — G8399 PT W/DXA RESULTS DOCUMENT: HCPCS | Performed by: INTERNAL MEDICINE

## 2019-10-02 PROCEDURE — 99212 OFFICE O/P EST SF 10 MIN: CPT | Performed by: INTERNAL MEDICINE

## 2019-10-02 PROCEDURE — 1090F PRES/ABSN URINE INCON ASSESS: CPT | Performed by: INTERNAL MEDICINE

## 2019-10-02 PROCEDURE — G8427 DOCREV CUR MEDS BY ELIG CLIN: HCPCS | Performed by: INTERNAL MEDICINE

## 2019-10-02 PROCEDURE — 20610 DRAIN/INJ JOINT/BURSA W/O US: CPT | Performed by: INTERNAL MEDICINE

## 2019-10-02 PROCEDURE — G8417 CALC BMI ABV UP PARAM F/U: HCPCS | Performed by: INTERNAL MEDICINE

## 2019-10-02 PROCEDURE — 1123F ACP DISCUSS/DSCN MKR DOCD: CPT | Performed by: INTERNAL MEDICINE

## 2019-10-02 PROCEDURE — 4040F PNEUMOC VAC/ADMIN/RCVD: CPT | Performed by: INTERNAL MEDICINE

## 2019-10-02 RX ORDER — OXYCODONE HYDROCHLORIDE AND ACETAMINOPHEN 5; 325 MG/1; MG/1
1 TABLET ORAL EVERY 8 HOURS PRN
Qty: 90 TABLET | Refills: 0 | Status: SHIPPED | OUTPATIENT
Start: 2019-10-02 | End: 2019-11-01

## 2019-10-24 RX ORDER — METHYLPREDNISOLONE ACETATE 80 MG/ML
40 INJECTION, SUSPENSION INTRA-ARTICULAR; INTRALESIONAL; INTRAMUSCULAR; SOFT TISSUE ONCE
Status: COMPLETED | OUTPATIENT
Start: 2019-10-24 | End: 2019-10-24

## 2019-10-24 RX ADMIN — METHYLPREDNISOLONE ACETATE 40 MG: 80 INJECTION, SUSPENSION INTRA-ARTICULAR; INTRALESIONAL; INTRAMUSCULAR; SOFT TISSUE at 13:26

## 2019-10-24 RX ADMIN — METHYLPREDNISOLONE ACETATE 40 MG: 80 INJECTION, SUSPENSION INTRA-ARTICULAR; INTRALESIONAL; INTRAMUSCULAR; SOFT TISSUE at 13:25

## 2019-11-04 ENCOUNTER — HOSPITAL ENCOUNTER (OUTPATIENT)
Dept: GENERAL RADIOLOGY | Age: 84
Discharge: HOME | End: 2019-11-06
Payer: MEDICARE

## 2019-11-04 ENCOUNTER — HOSPITAL ENCOUNTER (OUTPATIENT)
Age: 84
Discharge: HOME | End: 2019-11-06
Payer: MEDICARE

## 2019-11-04 DIAGNOSIS — M25.562 PAIN IN BOTH KNEES, UNSPECIFIED CHRONICITY: ICD-10-CM

## 2019-11-04 DIAGNOSIS — M25.561 PAIN IN BOTH KNEES, UNSPECIFIED CHRONICITY: ICD-10-CM

## 2019-11-04 DIAGNOSIS — M54.50 LUMBAR PAIN: ICD-10-CM

## 2019-11-04 DIAGNOSIS — M53.3 SACROILIAC PAIN: ICD-10-CM

## 2019-11-04 PROCEDURE — 72202 X-RAY EXAM SI JOINTS 3/> VWS: CPT

## 2019-11-04 PROCEDURE — 72100 X-RAY EXAM L-S SPINE 2/3 VWS: CPT

## 2019-11-04 PROCEDURE — 73565 X-RAY EXAM OF KNEES: CPT

## 2019-12-11 ENCOUNTER — HOSPITAL ENCOUNTER (OUTPATIENT)
Dept: MRI IMAGING | Facility: CLINIC | Age: 84
Discharge: HOME | End: 2019-12-13
Payer: MEDICARE

## 2019-12-11 DIAGNOSIS — M54.17 LEFT LUMBOSACRAL RADICULOPATHY: ICD-10-CM

## 2019-12-11 PROCEDURE — 72148 MRI LUMBAR SPINE W/O DYE: CPT

## 2020-02-13 ENCOUNTER — TELEPHONE (OUTPATIENT)
Dept: INFUSION THERAPY | Age: 85
End: 2020-02-13

## 2020-02-19 RX ORDER — EPINEPHRINE 1 MG/ML
0.3 INJECTION, SOLUTION, CONCENTRATE INTRAVENOUS PRN
Status: CANCELLED | OUTPATIENT
Start: 2020-02-20

## 2020-02-19 RX ORDER — DIPHENHYDRAMINE HYDROCHLORIDE 50 MG/ML
50 INJECTION INTRAMUSCULAR; INTRAVENOUS ONCE
Status: CANCELLED | OUTPATIENT
Start: 2020-02-20

## 2020-02-19 RX ORDER — METHYLPREDNISOLONE SODIUM SUCCINATE 125 MG/2ML
125 INJECTION, POWDER, LYOPHILIZED, FOR SOLUTION INTRAMUSCULAR; INTRAVENOUS ONCE
Status: CANCELLED | OUTPATIENT
Start: 2020-02-20

## 2020-02-19 RX ORDER — SODIUM CHLORIDE 9 MG/ML
INJECTION, SOLUTION INTRAVENOUS CONTINUOUS
Status: CANCELLED | OUTPATIENT
Start: 2020-02-20

## 2020-03-03 ENCOUNTER — HOSPITAL ENCOUNTER (OUTPATIENT)
Dept: INPATIENT UNIT | Age: 85
Setting detail: THERAPIES SERIES
Discharge: HOME | End: 2020-03-03
Payer: MEDICARE

## 2020-03-03 VITALS
OXYGEN SATURATION: 96 % | SYSTOLIC BLOOD PRESSURE: 167 MMHG | RESPIRATION RATE: 18 BRPM | DIASTOLIC BLOOD PRESSURE: 88 MMHG | TEMPERATURE: 97.2 F | HEART RATE: 82 BPM

## 2020-03-03 LAB
ANION GAP SERPL CALCULATED.3IONS-SCNC: 15 MMOL/L (ref 9–17)
BUN BLDV-MCNC: 18 MG/DL (ref 8–23)
BUN/CREAT BLD: ABNORMAL (ref 9–20)
CALCIUM SERPL-MCNC: 9.3 MG/DL (ref 8.6–10.4)
CHLORIDE BLD-SCNC: 100 MMOL/L (ref 98–107)
CO2: 23 MMOL/L (ref 20–31)
CREAT SERPL-MCNC: 0.49 MG/DL (ref 0.5–0.9)
GFR AFRICAN AMERICAN: >60 ML/MIN
GFR NON-AFRICAN AMERICAN: >60 ML/MIN
GFR SERPL CREATININE-BSD FRML MDRD: ABNORMAL ML/MIN/{1.73_M2}
GFR SERPL CREATININE-BSD FRML MDRD: ABNORMAL ML/MIN/{1.73_M2}
GLUCOSE BLD-MCNC: 156 MG/DL (ref 70–99)
POTASSIUM SERPL-SCNC: 3.5 MMOL/L (ref 3.7–5.3)
SODIUM BLD-SCNC: 138 MMOL/L (ref 135–144)

## 2020-03-03 PROCEDURE — 36415 COLL VENOUS BLD VENIPUNCTURE: CPT

## 2020-03-03 PROCEDURE — 80048 BASIC METABOLIC PNL TOTAL CA: CPT

## 2020-03-03 PROCEDURE — 6360000002 HC RX W HCPCS: Performed by: INTERNAL MEDICINE

## 2020-03-03 PROCEDURE — 96372 THER/PROPH/DIAG INJ SC/IM: CPT

## 2020-03-03 RX ORDER — METHYLPREDNISOLONE SODIUM SUCCINATE 125 MG/2ML
125 INJECTION, POWDER, LYOPHILIZED, FOR SOLUTION INTRAMUSCULAR; INTRAVENOUS ONCE
Status: CANCELLED | OUTPATIENT
Start: 2020-09-01

## 2020-03-03 RX ORDER — SODIUM CHLORIDE 9 MG/ML
INJECTION, SOLUTION INTRAVENOUS CONTINUOUS
Status: CANCELLED | OUTPATIENT
Start: 2020-09-01

## 2020-03-03 RX ORDER — EPINEPHRINE 1 MG/ML
0.3 INJECTION, SOLUTION, CONCENTRATE INTRAVENOUS PRN
Status: CANCELLED | OUTPATIENT
Start: 2020-09-01

## 2020-03-03 RX ORDER — DIPHENHYDRAMINE HYDROCHLORIDE 50 MG/ML
50 INJECTION INTRAMUSCULAR; INTRAVENOUS ONCE
Status: CANCELLED | OUTPATIENT
Start: 2020-09-01

## 2020-03-03 RX ADMIN — DENOSUMAB 60 MG: 60 INJECTION SUBCUTANEOUS at 15:53

## 2020-03-10 NOTE — TELEPHONE ENCOUNTER
Called pt to verify if pt wanted to continue to receive Prolia injections at [facility]. Pt states she still wants to get injections here and that she is still following with Dr Chung Marcos new office. Called Dr Chung Marcos office to request prescription, which they said they will fax. Awaiting new prescription.
Negative

## 2020-04-28 RX ORDER — PANTOPRAZOLE SODIUM 40 MG/1
40 TABLET, DELAYED RELEASE ORAL
Qty: 90 TABLET | Refills: 3 | Status: SHIPPED | OUTPATIENT
Start: 2020-04-28 | End: 2020-04-29 | Stop reason: SDUPTHER

## 2020-04-28 RX ORDER — LOSARTAN POTASSIUM 25 MG/1
25 TABLET ORAL DAILY
Qty: 90 TABLET | Refills: 3 | Status: SHIPPED | OUTPATIENT
Start: 2020-04-28 | End: 2020-04-29 | Stop reason: SDUPTHER

## 2020-04-28 RX ORDER — METOPROLOL TARTRATE 50 MG/1
50 TABLET, FILM COATED ORAL 2 TIMES DAILY
Qty: 180 TABLET | Refills: 3 | Status: SHIPPED | OUTPATIENT
Start: 2020-04-28 | End: 2020-04-29 | Stop reason: SDUPTHER

## 2020-04-29 RX ORDER — PANTOPRAZOLE SODIUM 40 MG/1
40 TABLET, DELAYED RELEASE ORAL
Qty: 90 TABLET | Refills: 3 | Status: SHIPPED | OUTPATIENT
Start: 2020-04-29

## 2020-04-29 RX ORDER — LOSARTAN POTASSIUM 25 MG/1
25 TABLET ORAL DAILY
Qty: 90 TABLET | Refills: 3 | Status: SHIPPED | OUTPATIENT
Start: 2020-04-29

## 2020-04-29 RX ORDER — METOPROLOL TARTRATE 50 MG/1
50 TABLET, FILM COATED ORAL 2 TIMES DAILY
Qty: 180 TABLET | Refills: 3 | Status: SHIPPED | OUTPATIENT
Start: 2020-04-29

## 2020-05-04 RX ORDER — LEVOTHYROXINE SODIUM 0.05 MG/1
50 TABLET ORAL DAILY
Qty: 30 TABLET | Refills: 3 | Status: SHIPPED | OUTPATIENT
Start: 2020-05-04

## 2020-06-03 ENCOUNTER — HOSPITAL ENCOUNTER (OUTPATIENT)
Age: 85
Setting detail: SPECIMEN
Discharge: HOME | End: 2020-06-03
Payer: MEDICARE

## 2020-06-03 ENCOUNTER — OFFICE VISIT (OUTPATIENT)
Dept: OBGYN CLINIC | Age: 85
End: 2020-06-03
Payer: MEDICARE

## 2020-06-03 VITALS
TEMPERATURE: 97.1 F | SYSTOLIC BLOOD PRESSURE: 126 MMHG | DIASTOLIC BLOOD PRESSURE: 80 MMHG | BODY MASS INDEX: 26.5 KG/M2 | HEIGHT: 60 IN | WEIGHT: 135 LBS

## 2020-06-03 LAB
DIRECT EXAM: NORMAL
Lab: NORMAL
SPECIMEN DESCRIPTION: NORMAL

## 2020-06-03 PROCEDURE — 87510 GARDNER VAG DNA DIR PROBE: CPT

## 2020-06-03 PROCEDURE — 87660 TRICHOMONAS VAGIN DIR PROBE: CPT

## 2020-06-03 PROCEDURE — G0101 CA SCREEN;PELVIC/BREAST EXAM: HCPCS | Performed by: OBSTETRICS & GYNECOLOGY

## 2020-06-03 PROCEDURE — 87480 CANDIDA DNA DIR PROBE: CPT

## 2020-06-03 NOTE — PROGRESS NOTES
neomycin-polymyxin-dexameth 3.5-58092-0.1 OINT   0    ASMANEX 120 METERED DOSES 220 MCG/INH inhaler   0    bacitracin-polymyxin b (POLYSPORIN) 500-33689 UNIT/GM ophthalmic ointment   0    Multiple Vitamins-Minerals (MULTIVITAMIN PO) Take  by mouth daily.  polyethylene glycol (MIRALAX) PACK packet Take 17 g by mouth daily.  Calcium Carbonate-Vit D-Min (CALTRATE 600+D PLUS MINERALS) 600-800 MG-UNIT TABS Take  by mouth daily.  Omega-3 Fatty Acids (FISH OIL) 1200 MG CAPS Take  by mouth daily.  GARLIC Take  by mouth daily.  simethicone (MYLANTA GAS) 125 MG chewable tablet Take 125 mg by mouth every 6 hours as needed.  vitamin B-12 (CYANOCOBALAMIN) 500 MCG tablet Take 500 mcg by mouth daily.  formoterol (PERFOROMIST) 20 MCG/2ML nebulizer solution Take 20 mcg by nebulization as needed. No current facility-administered medications for this visit. ALLERGIES:  Allergies as of 06/03/2020 - Review Complete 06/03/2020   Allergen Reaction Noted    Methotrexate derivatives Other (See Comments) 05/26/2019    No known allergies  09/18/2014    Seasonal Other (See Comments) 05/21/2019       Gynecologic History:    No LMP recorded. Patient is postmenopausal.    Sexually Active: No    STD History: No   Hormone Replacement Exposure: No      Genetic Qualified Family History of Breast, Ovarian , Colon or Uterine Cancer: No     If YES see scanned worksheet. Preventative Health Testing:    Health Maintenance:  Health Maintenance Due   Topic Date Due    Shingles Vaccine (1 of 2) 01/02/1985    Annual Wellness Visit (AWV)  05/29/2019       Review Of Systems (11 point):  Constitutional: No fever, chills or malaise;  No weight change or fatigue  Head and Eyes: No vision, Headache, Dizziness or trauma in last 12 months  ENT ROS: No hearing, Tinnitis, sinus or taste problems  Hematological and Lymphatic ROS:No Lymphoma, Von Willebrand's, Hemophillia or Bleeding History  Psych ROS: No Cataract     Hyperlipidemia     Osteoarthritis     Anemia     Allergic rhinitis           Hereditary Breast, Ovarian, Colon and Uterine Cancer screening Done. Tobacco & Secondary smoke risks reviewed; instructed on cessation and avoidance      Counseling Completed:  Preventative Health Recommendations and Follow up.              PLAN:  Annual exam performed  No cytology collected per ASCCP guidelines  Rx mammogram  Cultures BV yeast collected  Rx Miconazole   RTO one year annual exam

## 2020-06-17 ENCOUNTER — HOSPITAL ENCOUNTER (OUTPATIENT)
Dept: WOMENS IMAGING | Age: 85
Discharge: HOME | End: 2020-06-19
Payer: MEDICARE

## 2020-06-17 PROCEDURE — 77063 BREAST TOMOSYNTHESIS BI: CPT

## 2020-07-06 ENCOUNTER — OFFICE VISIT (OUTPATIENT)
Dept: OBGYN CLINIC | Age: 85
End: 2020-07-06
Payer: MEDICARE

## 2020-07-06 VITALS
WEIGHT: 138 LBS | BODY MASS INDEX: 27.09 KG/M2 | TEMPERATURE: 97.1 F | SYSTOLIC BLOOD PRESSURE: 130 MMHG | HEIGHT: 60 IN | DIASTOLIC BLOOD PRESSURE: 86 MMHG

## 2020-07-06 PROCEDURE — 1123F ACP DISCUSS/DSCN MKR DOCD: CPT | Performed by: OBSTETRICS & GYNECOLOGY

## 2020-07-06 PROCEDURE — G8427 DOCREV CUR MEDS BY ELIG CLIN: HCPCS | Performed by: OBSTETRICS & GYNECOLOGY

## 2020-07-06 PROCEDURE — G8399 PT W/DXA RESULTS DOCUMENT: HCPCS | Performed by: OBSTETRICS & GYNECOLOGY

## 2020-07-06 PROCEDURE — 4040F PNEUMOC VAC/ADMIN/RCVD: CPT | Performed by: OBSTETRICS & GYNECOLOGY

## 2020-07-06 PROCEDURE — 1090F PRES/ABSN URINE INCON ASSESS: CPT | Performed by: OBSTETRICS & GYNECOLOGY

## 2020-07-06 PROCEDURE — G8417 CALC BMI ABV UP PARAM F/U: HCPCS | Performed by: OBSTETRICS & GYNECOLOGY

## 2020-07-06 PROCEDURE — 99213 OFFICE O/P EST LOW 20 MIN: CPT | Performed by: OBSTETRICS & GYNECOLOGY

## 2020-07-06 PROCEDURE — 1036F TOBACCO NON-USER: CPT | Performed by: OBSTETRICS & GYNECOLOGY

## 2020-07-06 NOTE — PROGRESS NOTES
Manny Caceres  7/6/2020    YOB: 1935      HPI:  Manny Caceres is a 80 y.o. female No obstetric history on file. Patient states yeast infection has resolved. However, she noticed a bump on left labia majora near the prior healed bump she had and it had a thick white discharge in it when she squeezed it. For the past week since squeezing the bump and getting the thick white discharge out of it, she has been applying topical neosporin and it seems to be healing. She wants it evaluated today for possible infection and the progress of the healing. OB History   No obstetric history on file.        Past Medical History:   Diagnosis Date    Allergic rhinitis     Anemia     Anxiety     Asthma     Cataract     COPD [facility]chronic obstructive pulmonary disease) ([facility]     CTS (carpal tunnel syndrome)     Esophageal reflux     Headache(784.0)     Hyperlipidemia     Hypertension     Hypothyroidism     Osteoarthritis     Osteoporosis     Rheumatoid arthritis(714.0)        Past Surgical History:   Procedure Laterality Date    CATARACT REMOVAL WITH IMPLANT Bilateral     CYSTOCELE REPAIR      DILATION AND CURETTAGE OF UTERUS      four times    FOOT SURGERY      twice due to infection    HAMMER TOE SURGERY      right foot    HEMIARTHROPLASTY HIP Right 5/17/2019    HIP HEMIARTHROPLASTY performed by Devonte Anaya MD at [place] [id number], VAGINAL      INGUINAL HERNIA REPAIR      x2    JOINT REPLACEMENT Left     left hip    OTHER SURGICAL HISTORY      joint replacement right big toe    OTHER SURGICAL HISTORY      tumor removal from toe - pt not sure which toe    ROTATOR CUFF REPAIR Left     also frozen shoulder    TONSILLECTOMY AND ADENOIDECTOMY         Family History   Problem Relation Age of Onset    Cancer Mother     Hypertension Mother     Heart Disease Mother     Stroke Maternal Grandmother     Heart Disease Maternal Grandmother     Cancer Paternal Grandmother     Heart Disease Paternal Grandmother     Heart Disease Father     Heart Disease Maternal Grandfather     Heart Disease Paternal Grandfather        Social History     Socioeconomic History    Marital status:      Spouse name: Not on file    Number of children: Not on file    Years of education: Not on file    Highest education level: Not on file   Occupational History    Not on file   Social Needs    Financial resource strain: Not on file    Food insecurity     Worry: Not on file     Inability: Not on file   [employer] needs     Medical: Not on file     Non-medical: Not on file   Tobacco Use    Smoking status: Never Smoker    Smokeless tobacco: Never Used   Substance and Sexual Activity    Alcohol use: No     Alcohol/week: 0.0 standard drinks    Drug use: No    Sexual activity: Not on file   Lifestyle    Physical activity     Days per week: Not on file     Minutes per session: Not on file    Stress: Not on file   Relationships    Social connections     Talks on phone: Not on file     Gets together: Not on file     Attends Anabaptism service: Not on file     Active member of club or organization: Not on file     Attends meetings of clubs or organizations: Not on file     Relationship status: Not on file    Intimate partner violence     Fear of current or ex partner: Not on file     Emotionally abused: Not on file     Physically abused: Not on file     Forced sexual activity: Not on file   Other Topics Concern    Not on file   Social History Narrative    Not on file         MEDICATIONS:  Current Outpatient Medications   Medication Sig Dispense Refill    levothyroxine (SYNTHROID) 50 MCG tablet Take 1 tablet by mouth daily 30 tablet 3    losartan (COZAAR) 25 MG tablet Take 1 tablet by mouth daily 90 tablet 3    pantoprazole (PROTONIX) 40 MG tablet Take 1 tablet by mouth every morning (before breakfast) 90 tablet 3    metoprolol tartrate (LOPRESSOR) 50 MG tablet Take 1 tablet by mouth 2 times daily 180 tablet 3    denosumab (PROLIA) 60 MG/ML SOSY SC injection Inject 1 mL into the skin once for 1 dose 1 mL 0    furosemide (LASIX) 20 MG tablet Take 1 tablet by mouth daily as needed (per Dr. Tejal Monique) 90 tablet 3    lidocaine 4 % external patch Place 1 patch onto the skin daily R knee      simethicone (MYLICON) 80 MG chewable tablet Take 1 tablet by mouth every 6 hours as needed for Flatulence 180 tablet 3    leflunomide (ARAVA) 10 MG tablet Take 1 tablet by mouth daily Can resume when Dr. Piper Eric approves 30 tablet 3    hydroxychloroquine (PLAQUENIL) 200 MG tablet Take 1 tablet by mouth daily Can resume when Dr. Piper Eric approves 30 tablet 0    acetaminophen (TYLENOL) 325 MG tablet Take 650 mg by mouth every 6 hours as needed for Pain      aspirin 81 MG tablet Take 81 mg by mouth daily      neomycin-polymyxin-dexameth 3.5-66695-6.1 OINT   0    ASMANEX 120 METERED DOSES 220 MCG/INH inhaler   0    bacitracin-polymyxin b (POLYSPORIN) 500-58855 UNIT/GM ophthalmic ointment   0    Multiple Vitamins-Minerals (MULTIVITAMIN PO) Take  by mouth daily.  polyethylene glycol (MIRALAX) PACK packet Take 17 g by mouth daily.  Calcium Carbonate-Vit D-Min (CALTRATE 600+D PLUS MINERALS) 600-800 MG-UNIT TABS Take  by mouth daily.  Omega-3 Fatty Acids (FISH OIL) 1200 MG CAPS Take  by mouth daily.  GARLIC Take  by mouth daily.  simethicone (MYLANTA GAS) 125 MG chewable tablet Take 125 mg by mouth every 6 hours as needed.  vitamin B-12 (CYANOCOBALAMIN) 500 MCG tablet Take 500 mcg by mouth daily.  formoterol (PERFOROMIST) 20 MCG/2ML nebulizer solution Take 20 mcg by nebulization as needed. No current facility-administered medications for this visit.               ALLERGIES:  Allergies as of 07/06/2020 - Review Complete 07/06/2020   Allergen Reaction Noted    Methotrexate derivatives Other (See Comments) 05/26/2019    No known allergies  09/18/2014    Seasonal Other (See Comments) 05/21/2019         Review Of Systems (11 point):  Constitutional: No fever, chills or malaise; No weight change or fatigue  Head and Eyes: No vision, Headache, Dizziness or trauma in last 12 months  ENT ROS: No hearing, Tinnitis, sinus or taste problems  Hematological and Lymphatic ROS:No Lymphoma, Von Willebrand's, Hemophillia or Bleeding History  Psych ROS: No Depression, Homicidal thoughts,suicidal thoughts, or anxiety  Breast ROS: No prior breast abnormalities or lumps  Respiratory ROS: No SOB, Pneumoniae,Cough, or Pulmonary Embolism History  Cardiovascular ROS: No Chest Pain with Exertion, Palpitations, Syncope, Edema, Arrhythmia  Gastrointestinal ROS: No Indigestion, Heartburn, Nausea, vomiting, Diarrhea, Constipation,or Bowel Changes; No Bloody Stools or melena  Genito-Urinary ROS: No Dysuria, Hematuria or Nocturia. + Urinary Incontinence or Vaginal Discharge, + bump on left labia  Musculoskeletal ROS: No Arthralgia, Arthritis,Gout,Osteoporosis or Rheumatism  Neurological ROS: No CVA, Migraines, Epilepsy, Seizure Hx, or Limb Weakness  Dermatological ROS: No Rash, Itching, Hives, Mole Changes or Cancer          Blood pressure 130/86, temperature 97.1 °F (36.2 °C), height 5' (1.524 m), weight 138 lb (62.6 kg), not currently breastfeeding. Abdomen: Soft non-tender; good bowel sounds. No guarding, rebound or rigidity. No CVA tenderness bilaterally. Extremities: No calf tenderness, DTR 2/4, and No edema bilaterally    Pelvic: + left labial folliculitis with no active discharge, skin appears intact without active ulceration or infection. Diagnostics:  [facility] Digital Screen Self Referral W Or Wo Cad Bilateral    Result Date: 6/17/2020  EXAMINATION: SCREENING DIGITAL BILATERAL  MAMMOGRAM WITH TOMOSYNTHESIS, 6/17/2020 TECHNIQUE: Screening mammography of the bilateral breasts was performed with tomosynthesis.   2D standard and 3D tomosynthesis combination imaging performed through both breasts in the MLO and CC projection. Computer aided detection was utilized in the interpretation of this exam. COMPARISON: 02/25/2019, 06/06/2017 HISTORY: Screening. FINDINGS: The breast tissue is heterogeneously dense. There is no suspicious mass, suspicious microcalcification, or area of architectural distortion. Benign calcifications again demonstrated bilaterally. Benign findings. BI-RADS 2 BIRADS: BIRADS - CATEGORY 2 Benign, no evidence of malignancy. Normal interval follow-up is recommended in 12 months. OVERALL ASSESSMENT - BENIGN A letter of notification will be sent to the patient regarding the results. The [employer] [facility] [employer] recommends annual mammograms for women 40 years and older. Lab Results:  Results for orders placed or performed during the hospital encounter of 06/03/20   VAGINITIS DNA PROBE   Result Value Ref Range    Specimen Description . VAGINAL SWAB     Special Requests NOT REPORTED     Direct Exam NEGATIVE for Gardnerella vaginalis     Direct Exam NEGATIVE for Trichomonas vaginalis     Direct Exam NEGATIVE for Candida sp. Direct Exam       Method of testing is a DNA probe intended for detection and identification of Candida species, Gardnerella vaginalis, and Trichomonas vaginalis nucleic acid in vaginal fluid specimens from patients with symptoms of vaginitis/vaginosis.          Assessment:  Folliculitis - healing  Patient Active Problem List    Diagnosis Date Noted    Chronic diastolic CHF (congestive heart failure), NYHA class 1 ([address] 06/03/2019    Bilateral leg edema     S/P right hip fracture 05/22/2019    History of hemiarthroplasty of right hip 05/21/2019    Closed fracture of right hip ([address] 05/16/2019    Closed fracture of neck of right femur with routine healing 05/16/2019    Cushing syndrome ([address] 06/28/2017    Benign essential HTN 02/26/2017    Rheumatoid arthritis ([address] 12/01/2014    Methotrexate lung 12/01/2014    Asthma 07/28/2014   

## 2020-08-18 ENCOUNTER — HOSPITAL ENCOUNTER (OUTPATIENT)
Age: 85
Discharge: HOME | End: 2020-08-20
Payer: MEDICARE

## 2020-08-18 ENCOUNTER — HOSPITAL ENCOUNTER (OUTPATIENT)
Dept: GENERAL RADIOLOGY | Age: 85
Discharge: HOME | End: 2020-08-20
Payer: MEDICARE

## 2020-08-18 PROCEDURE — 71046 X-RAY EXAM CHEST 2 VIEWS: CPT

## 2020-08-28 ENCOUNTER — TELEPHONE (OUTPATIENT)
Dept: INFUSION THERAPY | Age: 85
End: 2020-08-28

## 2020-08-28 NOTE — TELEPHONE ENCOUNTER
Called pt to schedule next Prolia injection. No answer; left VM instructing pt to call [facility] to make appointment.

## 2020-09-23 ENCOUNTER — TELEPHONE (OUTPATIENT)
Dept: INFUSION THERAPY | Age: 85
End: 2020-09-23

## 2020-10-20 ENCOUNTER — TELEPHONE (OUTPATIENT)
Dept: INFUSION THERAPY | Age: 85
End: 2020-10-20

## 2020-10-20 NOTE — TELEPHONE ENCOUNTER
Called pt to schedule Prolia injection. Pt not available. Left message with pt's . Pt to call [facility]fusion [facility] to make appointment.

## 2020-10-29 ENCOUNTER — HOSPITAL ENCOUNTER (OUTPATIENT)
Dept: INPATIENT UNIT | Age: 85
Setting detail: THERAPIES SERIES
Discharge: HOME | End: 2020-10-29
Payer: MEDICARE

## 2020-10-29 VITALS
OXYGEN SATURATION: 94 % | DIASTOLIC BLOOD PRESSURE: 54 MMHG | TEMPERATURE: 96.9 F | HEART RATE: 82 BPM | RESPIRATION RATE: 18 BRPM | SYSTOLIC BLOOD PRESSURE: 113 MMHG

## 2020-10-29 LAB
ANION GAP SERPL CALCULATED.3IONS-SCNC: 12 MMOL/L (ref 9–17)
BUN BLDV-MCNC: 36 MG/DL (ref 8–23)
BUN/CREAT BLD: ABNORMAL (ref 9–20)
CALCIUM SERPL-MCNC: 9.3 MG/DL (ref 8.6–10.4)
CHLORIDE BLD-SCNC: 100 MMOL/L (ref 98–107)
CO2: 24 MMOL/L (ref 20–31)
CREAT SERPL-MCNC: 0.68 MG/DL (ref 0.5–0.9)
GFR AFRICAN AMERICAN: >60 ML/MIN
GFR NON-AFRICAN AMERICAN: >60 ML/MIN
GFR SERPL CREATININE-BSD FRML MDRD: ABNORMAL ML/MIN/{1.73_M2}
GFR SERPL CREATININE-BSD FRML MDRD: ABNORMAL ML/MIN/{1.73_M2}
GLUCOSE BLD-MCNC: 220 MG/DL (ref 70–99)
POTASSIUM SERPL-SCNC: 4.6 MMOL/L (ref 3.7–5.3)
SODIUM BLD-SCNC: 136 MMOL/L (ref 135–144)

## 2020-10-29 PROCEDURE — 36415 COLL VENOUS BLD VENIPUNCTURE: CPT

## 2020-10-29 PROCEDURE — 6360000002 HC RX W HCPCS: Performed by: INTERNAL MEDICINE

## 2020-10-29 PROCEDURE — 96372 THER/PROPH/DIAG INJ SC/IM: CPT

## 2020-10-29 PROCEDURE — 80048 BASIC METABOLIC PNL TOTAL CA: CPT

## 2020-10-29 RX ADMIN — DENOSUMAB 60 MG: 60 INJECTION SUBCUTANEOUS at 15:58

## 2020-10-29 NOTE — PROGRESS NOTES
Pt seen this date for prolia injection. VS obtained and labs WNL. Injection given in R arm. Pt tolerated injection well and discharged home with self.

## 2020-11-25 ENCOUNTER — OFFICE VISIT (OUTPATIENT)
Dept: OBGYN CLINIC | Age: 85
End: 2020-11-25
Payer: MEDICARE

## 2020-11-25 VITALS
BODY MASS INDEX: 29.03 KG/M2 | HEIGHT: 59 IN | TEMPERATURE: 97.7 F | SYSTOLIC BLOOD PRESSURE: 118 MMHG | DIASTOLIC BLOOD PRESSURE: 76 MMHG | WEIGHT: 144 LBS
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
## 2020-11-25 PROBLEM — I49.3 PVC'S (PREMATURE VENTRICULAR CONTRACTIONS): Status: ACTIVE | Noted: 2020-07-01

## 2020-11-25 PROBLEM — Z79.891 LONG TERM CURRENT USE OF OPIATE ANALGESIC: Status: ACTIVE | Noted: 2020-11-25

## 2020-11-25 PROBLEM — I82.409 DEEP VENOUS THROMBOSIS (HCC): Status: ACTIVE | Noted: 2020-11-25

## 2020-11-25 PROBLEM — M51.36 LUMBAR DEGENERATIVE DISC DISEASE: Status: ACTIVE | Noted: 2020-11-25

## 2020-11-25 PROBLEM — M54.16 LUMBAR RADICULOPATHY: Status: ACTIVE | Noted: 2020-11-25

## 2020-11-25 PROBLEM — I34.0 MILD MITRAL REGURGITATION: Status: ACTIVE | Noted: 2019-10-02

## 2020-11-25 PROBLEM — M16.12 PRIMARY OSTEOARTHRITIS OF LEFT HIP: Status: ACTIVE | Noted: 2020-11-25

## 2020-11-25 PROBLEM — Z79.899 LONG-TERM USE OF HIGH-RISK MEDICATION: Status: ACTIVE | Noted: 2020-11-25

## 2020-11-25 PROBLEM — M25.561 CHRONIC PAIN OF BOTH KNEES: Status: ACTIVE | Noted: 2019-10-02

## 2020-11-25 PROBLEM — G89.29 CHRONIC PAIN OF BOTH KNEES: Status: ACTIVE | Noted: 2019-10-02

## 2020-11-25 PROBLEM — R60.0 EDEMA OF LOWER EXTREMITY: Status: ACTIVE | Noted: 2020-11-25

## 2020-11-25 PROBLEM — E55.9 VITAMIN D DEFICIENCY: Status: ACTIVE | Noted: 2020-11-25

## 2020-11-25 PROBLEM — I07.1 MILD TRICUSPID REGURGITATION: Status: ACTIVE | Noted: 2019-10-02

## 2020-11-25 PROBLEM — I49.1 PREMATURE ATRIAL COMPLEXES: Status: ACTIVE | Noted: 2020-07-01

## 2020-11-25 PROBLEM — M53.3 SACROILIAC JOINT PAIN: Status: ACTIVE | Noted: 2020-11-25

## 2020-11-25 PROBLEM — R54 ADVANCED AGE: Status: ACTIVE | Noted: 2019-10-02

## 2020-11-25 PROBLEM — R06.02 SHORTNESS OF BREATH ON EXERTION: Status: ACTIVE | Noted: 2020-07-01

## 2020-11-25 PROBLEM — J47.9 BRONCHIECTASIS WITHOUT COMPLICATION (HCC): Status: ACTIVE | Noted: 2020-11-25

## 2020-11-25 PROBLEM — R00.0 SINUS TACHYCARDIA: Status: ACTIVE | Noted: 2019-11-05

## 2020-11-25 PROBLEM — M81.0 SENILE OSTEOPOROSIS: Status: ACTIVE | Noted: 2020-11-25

## 2020-11-25 PROBLEM — M25.562 CHRONIC PAIN OF BOTH KNEES: Status: ACTIVE | Noted: 2019-10-02

## 2020-11-25 PROCEDURE — 1036F TOBACCO NON-USER: CPT | Performed by: OBSTETRICS & GYNECOLOGY

## 2020-11-25 PROCEDURE — G8417 CALC BMI ABV UP PARAM F/U: HCPCS | Performed by: OBSTETRICS & GYNECOLOGY

## 2020-11-25 PROCEDURE — 1090F PRES/ABSN URINE INCON ASSESS: CPT | Performed by: OBSTETRICS & GYNECOLOGY

## 2020-11-25 PROCEDURE — G8428 CUR MEDS NOT DOCUMENT: HCPCS | Performed by: OBSTETRICS & GYNECOLOGY

## 2020-11-25 PROCEDURE — 4040F PNEUMOC VAC/ADMIN/RCVD: CPT | Performed by: OBSTETRICS & GYNECOLOGY

## 2020-11-25 PROCEDURE — G8484 FLU IMMUNIZE NO ADMIN: HCPCS | Performed by: OBSTETRICS & GYNECOLOGY

## 2020-11-25 PROCEDURE — 1123F ACP DISCUSS/DSCN MKR DOCD: CPT | Performed by: OBSTETRICS & GYNECOLOGY

## 2020-11-25 PROCEDURE — 99213 OFFICE O/P EST LOW 20 MIN: CPT | Performed by: OBSTETRICS & GYNECOLOGY

## 2020-11-25 PROCEDURE — G8399 PT W/DXA RESULTS DOCUMENT: HCPCS | Performed by: OBSTETRICS & GYNECOLOGY

## 2020-11-25 RX ORDER — SIMETHICONE 80 MG
80 TABLET,CHEWABLE ORAL EVERY 6 HOURS PRN
COMMUNITY

## 2020-11-25 RX ORDER — LOSARTAN POTASSIUM 25 MG/1
25 TABLET ORAL DAILY
COMMUNITY
Start: 2019-07-01

## 2020-11-25 RX ORDER — ZOLEDRONIC ACID 5 MG/100ML
INJECTION, SOLUTION INTRAVENOUS PRN
COMMUNITY
End: 2021-06-04

## 2020-11-25 RX ORDER — MOMETASONE FUROATE 100 UG/1
1 AEROSOL RESPIRATORY (INHALATION) DAILY
COMMUNITY

## 2020-11-25 RX ORDER — NYSTATIN 100000 [USP'U]/G
POWDER TOPICAL
Qty: 1 BOTTLE | Refills: 3 | Status: SHIPPED | OUTPATIENT
Start: 2020-11-25

## 2020-11-25 RX ORDER — LEVOTHYROXINE SODIUM 0.05 MG/1
50 TABLET ORAL DAILY
COMMUNITY
Start: 2019-06-04

## 2020-11-25 RX ORDER — LORAZEPAM 0.5 MG/1
0.5 TABLET ORAL DAILY PRN
COMMUNITY

## 2020-11-25 RX ORDER — BECLOMETHASONE DIPROPIONATE HFA 80 UG/1
AEROSOL, METERED RESPIRATORY (INHALATION)
COMMUNITY
Start: 2020-09-17

## 2020-11-25 RX ORDER — METOPROLOL TARTRATE 50 MG/1
50 TABLET, FILM COATED ORAL 2 TIMES DAILY
COMMUNITY
Start: 2019-07-01

## 2020-11-25 RX ORDER — PREDNISONE 20 MG/1
TABLET ORAL
COMMUNITY
Start: 2020-10-12

## 2020-11-25 RX ORDER — LISINOPRIL 10 MG/1
TABLET ORAL
COMMUNITY
Start: 2020-10-31

## 2020-11-25 RX ORDER — OXYCODONE AND ACETAMINOPHEN 10; 325 MG/1; MG/1
TABLET ORAL
COMMUNITY
Start: 2020-10-12

## 2020-11-25 RX ORDER — PANTOPRAZOLE SODIUM 40 MG/1
40 TABLET, DELAYED RELEASE ORAL DAILY
COMMUNITY
Start: 2019-07-01

## 2020-11-25 RX ORDER — GABAPENTIN 100 MG/1
CAPSULE ORAL
COMMUNITY
Start: 2020-11-03

## 2020-11-25 RX ORDER — GABAPENTIN 100 MG/1
100 CAPSULE ORAL 3 TIMES DAILY
COMMUNITY
Start: 2020-08-07

## 2020-11-25 RX ORDER — FUROSEMIDE 20 MG/1
TABLET ORAL
COMMUNITY
Start: 2019-07-01

## 2020-11-26 NOTE — PROGRESS NOTES
Harper Arauz  11/25/2020    YOB: 1935        HPI:  Harper Arauz is a 80 y.o. female No obstetric history on file. Patient with several weeks of groin itching having irritation and has been using her husbands jock itch cream and has noticed some improvement in the irritation, she thinks it might be from moisture       OB History   No obstetric history on file.        Past Medical History:   Diagnosis Date    Allergic rhinitis     Anemia     Anxiety     Asthma     Cataract     [facility]     CTS (carpal tunnel syndrome)     Esophageal reflux     Headache(784.0)     Hyperlipidemia     Hypertension     Hypothyroidism     Osteoarthritis     Osteoporosis     Rheumatoid arthritis(714.0)        Past Surgical History:   Procedure Laterality Date    CATARACT REMOVAL WITH IMPLANT Bilateral     CYSTOCELE REPAIR      DILATION AND CURETTAGE OF UTERUS      four times    FOOT SURGERY      twice due to infection    HAMMER TOE SURGERY      right foot    HEMIARTHROPLASTY HIP Right 5/17/2019    HIP HEMIARTHROPLASTY performed by Marin Santana MD at [id number], VAGINAL      [address]      x2    JOINT REPLACEMENT Left     left hip    OTHER SURGICAL HISTORY      joint replacement right big toe    OTHER SURGICAL HISTORY      tumor removal from toe - pt not sure which toe    ROTATOR CUFF REPAIR Left     also frozen shoulder    TONSILLECTOMY AND ADENOIDECTOMY         Family History   Problem Relation Age of Onset    Cancer Mother     Hypertension Mother     Heart Disease Mother     Stroke Maternal Grandmother     Heart Disease Maternal Grandmother     Cancer Paternal Grandmother     Heart Disease Paternal Grandmother     Heart Disease Father     Heart Disease Maternal Grandfather     Heart Disease Paternal Grandfather        Social History     Socioeconomic History    Marital status:      Spouse name: Not on file    Number of children: Not on file    Years of education: Not on file    Highest education level: Not on file   Occupational History    Not on file   Social Needs    Financial resource strain: Not on file    Food insecurity     Worry: Not on file     Inability: Not on file    Transportation needs     Medical: Not on file     Non-medical: Not on file   Tobacco Use    Smoking status: Never Smoker    Smokeless tobacco: Never Used   Substance and Sexual Activity    Alcohol use: No     Alcohol/week: 0.0 standard drinks    Drug use: No    Sexual activity: Not on file   Lifestyle    Physical activity     Days per week: Not on file     Minutes per session: Not on file    Stress: Not on file   Relationships    Social connections     Talks on phone: Not on file     Gets together: Not on file     Attends Hoahaoism service: Not on file     Active member of club or organization: Not on file     Attends meetings of clubs or organizations: Not on file     Relationship status: Not on file    Intimate partner violence     Fear of current or ex partner: Not on file     Emotionally abused: Not on file     Physically abused: Not on file     Forced sexual activity: Not on file   Other Topics Concern    Not on file   Social History Narrative    Not on file         MEDICATIONS:  Current Outpatient Medications   Medication Sig Dispense Refill    gabapentin (NEURONTIN) 100 MG capsule Take 100 mg by mouth 3 times daily.       denosumab (PROLIA) 60 MG/ML SOSY SC injection Inject 60 mg into the skin once      furosemide (LASIX) 20 MG tablet Take by mouth      levothyroxine (SYNTHROID) 50 MCG tablet Take 50 mcg by mouth daily      losartan (COZAAR) 25 MG tablet Take 25 mg by mouth daily      metoprolol tartrate (LOPRESSOR) 50 MG tablet Take 50 mg by mouth 2 times daily      pantoprazole (PROTONIX) 40 MG tablet Take 40 mg by mouth daily      nystatin (MYCOSTATIN) 046308 UNIT/GM powder Apply 3 times daily. 1 Bottle 3    B Complex Vitamins (B COMPLEX 1 PO) Take 1 capsule by mouth daily      Cyanocobalamin (VITAMIN B 12 PO) Take 100 mcg by mouth daily      sodium chloride 0.9 % SOLN 1,000 mL with bacitracin 87738 units SOLR 50,000 Units, polymyxin B 965147 units SOLR 500,000 Units 0.5 inches every 12 hours      Aspirin Buf,CaCarb-MgCarb-MgO, 81 MG TABS Take by mouth      QVAR REDIHALER 80 MCG/ACT AERB inhaler inhale 1 puff by mouth once daily      gabapentin (NEURONTIN) 100 MG capsule TAKE 1 CAPSULE 3 TIMES A DAY      lisinopril (PRINIVIL;ZESTRIL) 10 MG tablet TAKE 1 TABLET BY MOUTH EVERY DAY      LORazepam (ATIVAN) 0.5 MG tablet Take 0.5 mg by mouth daily as needed.  oxyCODONE-acetaminophen (PERCOCET)  MG per tablet TAKE 1 TABLET BY MOUTH EVERY 8 (EIGHT) HOURS AS NEEDED FOR PAIN FOR UP TO 30 DAYS.       predniSONE (DELTASONE) 20 MG tablet TAKE 1 TABLET BY MOUTH EVERY DAY      zoledronic acid (RECLAST) 5 MG/100ML SOLN as needed      mometasone (ASMANEX HFA) 100 MCG/ACT AERO inhaler 1 puff daily      simethicone (MYLICON) 80 MG chewable tablet Take 80 mg by mouth every 6 hours as needed      levothyroxine (SYNTHROID) 50 MCG tablet Take 1 tablet by mouth daily 30 tablet 3    losartan (COZAAR) 25 MG tablet Take 1 tablet by mouth daily 90 tablet 3    pantoprazole (PROTONIX) 40 MG tablet Take 1 tablet by mouth every morning (before breakfast) 90 tablet 3    metoprolol tartrate (LOPRESSOR) 50 MG tablet Take 1 tablet by mouth 2 times daily 180 tablet 3    denosumab (PROLIA) 60 MG/ML SOSY SC injection Inject 1 mL into the skin once for 1 dose 1 mL 0    furosemide (LASIX) 20 MG tablet Take 1 tablet by mouth daily as needed (per Dr. Amari Herr) 90 tablet 3    lidocaine 4 % external patch Place 1 patch onto the skin daily R knee      simethicone (MYLICON) 80 MG chewable tablet Take 1 tablet by mouth every 6 hours as needed for Flatulence 180 tablet 3    leflunomide (ARAVA) 10 MG tablet Take 1 tablet by mouth daily Can resume when Dr. Bijal Yang approves 30 tablet 3    hydroxychloroquine (PLAQUENIL) 200 MG tablet Take 1 tablet by mouth daily Can resume when Dr. Bijal Yang approves 30 tablet 0    acetaminophen (TYLENOL) 325 MG tablet Take 650 mg by mouth every 6 hours as needed for Pain      aspirin 81 MG tablet Take 81 mg by mouth daily      neomycin-polymyxin-dexameth 3.5-06890-3.1 OINT   0    ASMANEX 120 METERED DOSES 220 MCG/INH inhaler   0    bacitracin-polymyxin b (POLYSPORIN) 500-93370 UNIT/GM ophthalmic ointment   0    Multiple Vitamins-Minerals (MULTIVITAMIN PO) Take  by mouth daily.  polyethylene glycol (MIRALAX) PACK packet Take 17 g by mouth daily.  Calcium Carbonate-Vit D-Min (CALTRATE 600+D PLUS MINERALS) 600-800 MG-UNIT TABS Take  by mouth daily.  Omega-3 Fatty Acids (FISH OIL) 1200 MG CAPS Take  by mouth daily.  GARLIC Take  by mouth daily.  simethicone (MYLANTA GAS) 125 MG chewable tablet Take 125 mg by mouth every 6 hours as needed.  vitamin B-12 (CYANOCOBALAMIN) 500 MCG tablet Take 500 mcg by mouth daily.  formoterol (PERFOROMIST) 20 MCG/2ML nebulizer solution Take 20 mcg by nebulization as needed. No current facility-administered medications for this visit. ALLERGIES:  Allergies as of 11/25/2020 - Review Complete 11/25/2020   Allergen Reaction Noted    Methotrexate  11/05/2019    Methotrexate derivatives Other (See Comments) 05/26/2019    No known allergies  09/18/2014    Seasonal Other (See Comments) 05/21/2019       Review Of Systems (11 point):  Constitutional: No fever, chills or malaise;  No weight change or fatigue  Head and Eyes: No vision, Headache, Dizziness or trauma in last 12 months  ENT ROS: No hearing, Tinnitis, sinus or taste problems  Hematological and Lymphatic ROS:No Lymphoma, Von Willebrand's, Hemophillia or Bleeding History  Psych ROS: No Depression, Homicidal thoughts,suicidal thoughts, or anxiety  Breast ROS: No prior breast abnormalities or lumps  Respiratory ROS: No SOB, Pneumoniae,Cough, or Pulmonary Embolism History  Cardiovascular ROS: No Chest Pain with Exertion, Palpitations, Syncope, Edema, Arrhythmia  Gastrointestinal ROS: No Indigestion, Heartburn, Nausea, vomiting, Diarrhea, Constipation,or Bowel Changes; No Bloody Stools or melena  Genito-Urinary ROS: No Dysuria, Hematuria or Nocturia. No Urinary Incontinence or Vaginal Discharge, + groin/labial irritation   Musculoskeletal ROS: No Arthralgia, Arthritis,Gout,Osteoporosis or Rheumatism  Neurological ROS: No CVA, Migraines, Epilepsy, Seizure Hx, or Limb Weakness  Dermatological ROS: No Rash, Itching, Hives, Mole Changes or Cancer          Blood pressure 118/76, temperature 97.7 °F (36.5 °C), height 4' 11\" (1.499 m), weight 144 lb (65.3 kg), not currently breastfeeding. Abdomen: Soft non-tender; good bowel sounds. No guarding, rebound or rigidity. No CVA tenderness bilaterally. Extremities: No calf tenderness, DTR 2/4, and No edema bilaterally    Pelvic: + irritation and erythema consistent with corpus candidiasis on bilateral thighs, groin and labia majora     Diagnostics:  No results found.     Lab Results:  Results for orders placed or performed during the hospital encounter of 28/74/63   BASIC METABOLIC PANEL   Result Value Ref Range    Glucose 220 (H) 70 - 99 mg/dL    BUN 36 (H) 8 - 23 mg/dL    CREATININE 0.68 0.50 - 0.90 mg/dL    Bun/Cre Ratio NOT REPORTED 9 - 20    Calcium 9.3 8.6 - 10.4 mg/dL    Sodium 136 135 - 144 mmol/L    Potassium 4.6 3.7 - 5.3 mmol/L    Chloride 100 98 - 107 mmol/L    CO2 24 20 - 31 mmol/L    Anion Gap 12 9 - 17 mmol/L    GFR Non-African American >60 >60 mL/min    GFR African American >60 >60 mL/min    GFR Comment          GFR Staging NOT REPORTED          Assessment:  Tinea corpus  Patient Active Problem List    Diagnosis Date Noted    Bronchiectasis without complication (HCC) 98/16/8397    Deep venous thrombosis ([address] 11/25/2020    Edema of lower extremity 11/25/2020    Long term current use of opiate analgesic 11/25/2020    Long-term use of high-risk medication 11/25/2020    Lumbar degenerative disc disease 11/25/2020    Lumbar radiculopathy 11/25/2020    Primary osteoarthritis of left hip 11/25/2020    Sacroiliac joint pain 11/25/2020    Senile osteoporosis 11/25/2020    Vitamin D deficiency 11/25/2020    Premature atrial complexes 07/01/2020    PVC's (premature ventricular contractions) 07/01/2020    Shortness of breath on exertion 07/01/2020    Sinus tachycardia 11/05/2019    Advanced age 10/02/2019    Chronic pain of both knees 10/02/2019    Fall 10/02/2019    Mild mitral regurgitation 10/02/2019    Mild tricuspid regurgitation 10/02/2019    Chronic diastolic CHF (congestive heart failure), NYHA class 1 ([address] 06/03/2019    Bilateral leg edema     S/P right hip fracture 05/22/2019    History of hemiarthroplasty of right hip 05/21/2019    Closed fracture of right hip ([address] 05/16/2019    Closed fracture of neck of right femur with routine healing 05/16/2019    Cushing syndrome ([address] 06/28/2017    Benign essential HTN 02/26/2017    Rheumatoid arthritis ([address] 12/01/2014    Methotrexate lung 12/01/2014    Asthma 07/28/2014    Hypothyroidism     Osteoporosis     Esophageal reflux     Cataract     Hyperlipidemia     Osteoarthritis     Anemia     Allergic rhinitis        PLAN:  Exam performed  Nystatin powder sent to pharmacy for TID use  RTO as needed
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
## 2020-12-30 ENCOUNTER — OFFICE VISIT (OUTPATIENT)
Dept: OBGYN CLINIC | Age: 85
End: 2020-12-30
Payer: MEDICARE

## 2020-12-30 VITALS
HEIGHT: 59 IN | WEIGHT: 139 LBS | HEART RATE: 113 BPM | DIASTOLIC BLOOD PRESSURE: 80 MMHG | BODY MASS INDEX: 28.02 KG/M2 | TEMPERATURE: 97 F | SYSTOLIC BLOOD PRESSURE: 120 MMHG

## 2020-12-30 PROCEDURE — 4040F PNEUMOC VAC/ADMIN/RCVD: CPT | Performed by: OBSTETRICS & GYNECOLOGY

## 2020-12-30 PROCEDURE — 1090F PRES/ABSN URINE INCON ASSESS: CPT | Performed by: OBSTETRICS & GYNECOLOGY

## 2020-12-30 PROCEDURE — G8399 PT W/DXA RESULTS DOCUMENT: HCPCS | Performed by: OBSTETRICS & GYNECOLOGY

## 2020-12-30 PROCEDURE — G8484 FLU IMMUNIZE NO ADMIN: HCPCS | Performed by: OBSTETRICS & GYNECOLOGY

## 2020-12-30 PROCEDURE — G8417 CALC BMI ABV UP PARAM F/U: HCPCS | Performed by: OBSTETRICS & GYNECOLOGY

## 2020-12-30 PROCEDURE — G8427 DOCREV CUR MEDS BY ELIG CLIN: HCPCS | Performed by: OBSTETRICS & GYNECOLOGY

## 2020-12-30 PROCEDURE — 1036F TOBACCO NON-USER: CPT | Performed by: OBSTETRICS & GYNECOLOGY

## 2020-12-30 PROCEDURE — 1123F ACP DISCUSS/DSCN MKR DOCD: CPT | Performed by: OBSTETRICS & GYNECOLOGY

## 2020-12-30 PROCEDURE — 99213 OFFICE O/P EST LOW 20 MIN: CPT | Performed by: OBSTETRICS & GYNECOLOGY

## 2020-12-30 RX ORDER — CHLORHEXIDINE GLUCONATE 0.12 MG/ML
RINSE ORAL
COMMUNITY
Start: 2020-12-07

## 2020-12-30 RX ORDER — OXYCODONE AND ACETAMINOPHEN 10; 325 MG/1; MG/1
1 TABLET ORAL EVERY 8 HOURS PRN
COMMUNITY
Start: 2020-12-24 | End: 2021-01-23

## 2020-12-30 RX ORDER — ASPIRIN 81 MG/1
81 TABLET ORAL DAILY
COMMUNITY

## 2020-12-30 RX ORDER — BACITRACIN ZINC AND POLYMYXIN B SULFATE 500; 10000 [USP'U]/G; [USP'U]/G
0.5 OINTMENT OPHTHALMIC EVERY 12 HOURS
COMMUNITY

## 2020-12-30 NOTE — PROGRESS NOTES
Roula Zarate  12/30/2020    YOB: 1935      HPI:  Roula Zarate is a 80 y.o. female No obstetric history on file. Patient with bump on right buttock that is slightly painful - noticed it after packing her buttock and labia with washcloth due to moisture. OB History   No obstetric history on file.        Past Medical History:   Diagnosis Date    Allergic rhinitis     Anemia     Anxiety     Asthma     Cataract     COPD (chronic obstructive pulmonary disease) (HCC)     CTS (carpal tunnel syndrome)     Esophageal reflux     Headache(784.0)     Hyperlipidemia     Hypertension     Hypothyroidism     Osteoarthritis     Osteoporosis     Rheumatoid arthritis(714.0)        Past Surgical History:   Procedure Laterality Date    CATARACT REMOVAL WITH IMPLANT Bilateral     CYSTOCELE REPAIR      DILATION AND CURETTAGE OF UTERUS      four times    FOOT SURGERY      twice due to infection    HAMMER TOE SURGERY      right foot    HEMIARTHROPLASTY HIP Right 5/17/2019    HIP HEMIARTHROPLASTY performed by Geoff Donald MD at [facility], VAGINAL      [address]      x2    JOINT REPLACEMENT Left     left hip    OTHER SURGICAL HISTORY      joint replacement right big toe    OTHER SURGICAL HISTORY      tumor removal from toe - pt not sure which toe    ROTATOR CUFF REPAIR Left     also frozen shoulder    TONSILLECTOMY AND ADENOIDECTOMY         Family History   Problem Relation Age of Onset    Cancer Mother     Hypertension Mother     Heart Disease Mother     Stroke Maternal Grandmother     Heart Disease Maternal Grandmother     Cancer Paternal Grandmother     Heart Disease Paternal Grandmother     Heart Disease Father     Heart Disease Maternal Grandfather     Heart Disease Paternal Grandfather        Social History     Socioeconomic History    Marital status:      Spouse name: Not on file  Number of children: Not on file    Years of education: Not on file    Highest education level: Not on file   Occupational History    Not on file   Social Needs    Financial resource strain: Not on file    Food insecurity     Worry: Not on file     Inability: Not on file    Transportation needs     Medical: Not on file     Non-medical: Not on file   Tobacco Use    Smoking status: Never Smoker    Smokeless tobacco: Never Used   Substance and Sexual Activity    Alcohol use: No     Alcohol/week: 0.0 standard drinks    Drug use: No    Sexual activity: Not on file   Lifestyle    Physical activity     Days per week: Not on file     Minutes per session: Not on file    Stress: Not on file   Relationships    Social connections     Talks on phone: Not on file     Gets together: Not on file     Attends Yarsanism service: Not on file     Active member of club or organization: Not on file     Attends meetings of clubs or organizations: Not on file     Relationship status: Not on file    Intimate partner violence     Fear of current or ex partner: Not on file     Emotionally abused: Not on file     Physically abused: Not on file     Forced sexual activity: Not on file   Other Topics Concern    Not on file   Social History Narrative    Not on file         MEDICATIONS:  Current Outpatient Medications   Medication Sig Dispense Refill    oxyCODONE-acetaminophen (PERCOCET)  MG per tablet Take 1 tablet by mouth every 8 hours as needed.       Biotin 2.5 MG CAPS daily      chlorhexidine (PERIDEX) 0.12 % solution RINSE WITH ONE CAPFUL FOR 1 MINUTE AND SPIT three times a day      aspirin 81 MG EC tablet Take 81 mg by mouth daily      bacitracin-polymyxin b (POLYSPORIN) 500-75892 UNIT/GM ophthalmic ointment 0.5 inches every 12 hours      cyanocobalamin 100 MCG tablet Take 100 mcg by mouth daily      B Complex Vitamins (B COMPLEX 1 PO) Take 1 capsule by mouth daily  Cyanocobalamin (VITAMIN B 12 PO) Take 100 mcg by mouth daily      sodium chloride 0.9 % SOLN 1,000 mL with bacitracin 41862 units SOLR 50,000 Units, polymyxin B 241201 units SOLR 500,000 Units 0.5 inches every 12 hours      Aspirin Buf,CaCarb-MgCarb-MgO, 81 MG TABS Take by mouth      QVAR REDIHALER 80 MCG/ACT AERB inhaler inhale 1 puff by mouth once daily      gabapentin (NEURONTIN) 100 MG capsule Take 100 mg by mouth 3 times daily.  gabapentin (NEURONTIN) 100 MG capsule TAKE 1 CAPSULE 3 TIMES A DAY      lisinopril (PRINIVIL;ZESTRIL) 10 MG tablet TAKE 1 TABLET BY MOUTH EVERY DAY      LORazepam (ATIVAN) 0.5 MG tablet Take 0.5 mg by mouth daily as needed.  oxyCODONE-acetaminophen (PERCOCET)  MG per tablet TAKE 1 TABLET BY MOUTH EVERY 8 (EIGHT) HOURS AS NEEDED FOR PAIN FOR UP TO 30 DAYS.  predniSONE (DELTASONE) 20 MG tablet TAKE 1 TABLET BY MOUTH EVERY DAY      zoledronic acid (RECLAST) 5 MG/100ML SOLN as needed      denosumab (PROLIA) 60 MG/ML SOSY SC injection Inject 60 mg into the skin once      furosemide (LASIX) 20 MG tablet Take by mouth      levothyroxine (SYNTHROID) 50 MCG tablet Take 50 mcg by mouth daily      losartan (COZAAR) 25 MG tablet Take 25 mg by mouth daily      metoprolol tartrate (LOPRESSOR) 50 MG tablet Take 50 mg by mouth 2 times daily      mometasone (ASMANEX HFA) 100 MCG/ACT AERO inhaler 1 puff daily      pantoprazole (PROTONIX) 40 MG tablet Take 40 mg by mouth daily      simethicone (MYLICON) 80 MG chewable tablet Take 80 mg by mouth every 6 hours as needed      nystatin (MYCOSTATIN) 945003 UNIT/GM powder Apply 3 times daily.  1 Bottle 3    levothyroxine (SYNTHROID) 50 MCG tablet Take 1 tablet by mouth daily 30 tablet 3    losartan (COZAAR) 25 MG tablet Take 1 tablet by mouth daily 90 tablet 3    pantoprazole (PROTONIX) 40 MG tablet Take 1 tablet by mouth every morning (before breakfast) 90 tablet 3  metoprolol tartrate (LOPRESSOR) 50 MG tablet Take 1 tablet by mouth 2 times daily 180 tablet 3    denosumab (PROLIA) 60 MG/ML SOSY SC injection Inject 1 mL into the skin once for 1 dose 1 mL 0    furosemide (LASIX) 20 MG tablet Take 1 tablet by mouth daily as needed (per Dr. Ezequiel Winter) 90 tablet 3    lidocaine 4 % external patch Place 1 patch onto the skin daily R knee      simethicone (MYLICON) 80 MG chewable tablet Take 1 tablet by mouth every 6 hours as needed for Flatulence 180 tablet 3    leflunomide (ARAVA) 10 MG tablet Take 1 tablet by mouth daily Can resume when Dr. Susana Man approves 30 tablet 3    hydroxychloroquine (PLAQUENIL) 200 MG tablet Take 1 tablet by mouth daily Can resume when Dr. Susana Man approves 30 tablet 0    acetaminophen (TYLENOL) 325 MG tablet Take 650 mg by mouth every 6 hours as needed for Pain      aspirin 81 MG tablet Take 81 mg by mouth daily      neomycin-polymyxin-dexameth 3.5-61737-0.1 OINT   0    ASMANEX 120 METERED DOSES 220 MCG/INH inhaler   0    bacitracin-polymyxin b (POLYSPORIN) 500-27364 UNIT/GM ophthalmic ointment   0    Multiple Vitamins-Minerals (MULTIVITAMIN PO) Take  by mouth daily.  polyethylene glycol (MIRALAX) PACK packet Take 17 g by mouth daily.  Calcium Carbonate-Vit D-Min (CALTRATE 600+D PLUS MINERALS) 600-800 MG-UNIT TABS Take  by mouth daily.  Omega-3 Fatty Acids (FISH OIL) 1200 MG CAPS Take  by mouth daily.  GARLIC Take  by mouth daily.  simethicone (MYLANTA GAS) 125 MG chewable tablet Take 125 mg by mouth every 6 hours as needed.  vitamin B-12 (CYANOCOBALAMIN) 500 MCG tablet Take 500 mcg by mouth daily.  formoterol (PERFOROMIST) 20 MCG/2ML nebulizer solution Take 20 mcg by nebulization as needed. No current facility-administered medications for this visit.               ALLERGIES:  Allergies as of 12/30/2020 - Review Complete 12/30/2020   Allergen Reaction Noted    Methotrexate  11/05/2019  Methotrexate derivatives Other (See Comments) 05/26/2019    No known allergies  09/18/2014    Seasonal Other (See Comments) 05/21/2019   Review Of Systems (11 point):  Constitutional: No fever, chills or malaise; No weight change or fatigue  Head and Eyes: No vision, Headache, Dizziness or trauma in last 12 months  ENT ROS: No hearing, Tinnitis, sinus or taste problems  Hematological and Lymphatic ROS:No Lymphoma, Von Willebrand's, Hemophillia or Bleeding History  Psych ROS: No Depression, Homicidal thoughts,suicidal thoughts, or anxiety  Breast ROS: No prior breast abnormalities or lumps  Respiratory ROS: No SOB, Pneumoniae,Cough, or Pulmonary Embolism History  Cardiovascular ROS: No Chest Pain with Exertion, Palpitations, Syncope, Edema, Arrhythmia  Gastrointestinal ROS: No Indigestion, Heartburn, Nausea, vomiting, Diarrhea, Constipation,or Bowel Changes; No Bloody Stools or melena  Genito-Urinary ROS: No Dysuria, Hematuria or Nocturia. No Urinary Incontinence or Vaginal Discharge, + bump on right buttock  Musculoskeletal ROS: No Arthralgia, Arthritis,Gout,Osteoporosis or Rheumatism  Neurological ROS: No CVA, Migraines, Epilepsy, Seizure Hx, or Limb Weakness  Dermatological ROS: No Rash, Itching, Hives, Mole Changes or Cancer          Blood pressure 120/80, pulse 113, temperature 97 °F (36.1 °C), height 4' 11\" (1.499 m), weight 139 lb (63 kg), not currently breastfeeding. Abdomen: Soft non-tender; good bowel sounds. No guarding, rebound or rigidity. No CVA tenderness bilaterally. Extremities: No calf tenderness, DTR 2/4, and No edema bilaterally    Pelvic: + pressure ulcer very shallow < 1 cm on right buttock     Diagnostics:  No results found.     Lab Results:  Results for orders placed or performed during the hospital encounter of 39/80/60   BASIC METABOLIC PANEL   Result Value Ref Range    Glucose 220 (H) 70 - 99 mg/dL    BUN 36 (H) 8 - 23 mg/dL    CREATININE 0.68 0.50 - 0.90 mg/dL Use Bacitracin cream BID to the ulcerated red tissue area  RTO as needed

## 2021-01-06 ENCOUNTER — TELEPHONE (OUTPATIENT)
Dept: OBGYN CLINIC | Age: 86
End: 2021-01-06

## 2021-01-06 NOTE — TELEPHONE ENCOUNTER
Pt stating cream is not helping vaginal spot,( area still red & raised ) is there something else she can take to help?     [phone]

## 2021-01-07 RX ORDER — SULFAMETHOXAZOLE AND TRIMETHOPRIM 800; 160 MG/1; MG/1
1 TABLET ORAL 2 TIMES DAILY
Qty: 20 TABLET | Refills: 0 | Status: SHIPPED | OUTPATIENT
Start: 2021-01-07 | End: 2021-01-17

## 2021-05-12 RX ORDER — EPINEPHRINE 1 MG/ML
0.3 INJECTION, SOLUTION, CONCENTRATE INTRAVENOUS PRN
Status: CANCELLED | OUTPATIENT
Start: 2021-05-12

## 2021-05-12 RX ORDER — METHYLPREDNISOLONE SODIUM SUCCINATE 125 MG/2ML
125 INJECTION, POWDER, LYOPHILIZED, FOR SOLUTION INTRAMUSCULAR; INTRAVENOUS ONCE
Status: CANCELLED | OUTPATIENT
Start: 2021-05-12 | End: 2021-05-12

## 2021-05-12 RX ORDER — DIPHENHYDRAMINE HYDROCHLORIDE 50 MG/ML
50 INJECTION INTRAMUSCULAR; INTRAVENOUS ONCE
Status: CANCELLED | OUTPATIENT
Start: 2021-05-12 | End: 2021-05-12

## 2021-05-12 RX ORDER — SODIUM CHLORIDE 9 MG/ML
INJECTION, SOLUTION INTRAVENOUS CONTINUOUS
Status: CANCELLED | OUTPATIENT
Start: 2021-05-12

## 2021-05-17 ENCOUNTER — TELEPHONE (OUTPATIENT)
Dept: INFUSION THERAPY | Age: 86
End: 2021-05-17

## 2021-06-04 ENCOUNTER — HOSPITAL ENCOUNTER (OUTPATIENT)
Dept: INFUSION THERAPY | Age: 86
Setting detail: INFUSION SERIES
Discharge: HOME | End: 2021-06-04
Payer: MEDICARE

## 2021-06-04 VITALS
RESPIRATION RATE: 18 BRPM | DIASTOLIC BLOOD PRESSURE: 55 MMHG | SYSTOLIC BLOOD PRESSURE: 111 MMHG | HEART RATE: 68 BPM | OXYGEN SATURATION: 94 % | TEMPERATURE: 96.9 F

## 2021-06-04 DIAGNOSIS — M81.0 OSTEOPOROSIS, UNSPECIFIED OSTEOPOROSIS TYPE, UNSPECIFIED PATHOLOGICAL FRACTURE PRESENCE: Primary | ICD-10-CM

## 2021-06-04 LAB
CALCIUM SERPL-MCNC: 9 MG/DL (ref 8.6–10.4)
CREAT SERPL-MCNC: 0.52 MG/DL (ref 0.5–0.9)
GFR AFRICAN AMERICAN: >60 ML/MIN
GFR NON-AFRICAN AMERICAN: >60 ML/MIN
GFR SERPL CREATININE-BSD FRML MDRD: NORMAL ML/MIN/{1.73_M2}
GFR SERPL CREATININE-BSD FRML MDRD: NORMAL ML/MIN/{1.73_M2}

## 2021-06-04 PROCEDURE — 96401 CHEMO ANTI-NEOPL SQ/IM: CPT

## 2021-06-04 PROCEDURE — 96372 THER/PROPH/DIAG INJ SC/IM: CPT

## 2021-06-04 PROCEDURE — 82565 ASSAY OF CREATININE: CPT

## 2021-06-04 PROCEDURE — 6360000002 HC RX W HCPCS: Performed by: INTERNAL MEDICINE

## 2021-06-04 PROCEDURE — 36415 COLL VENOUS BLD VENIPUNCTURE: CPT

## 2021-06-04 PROCEDURE — 82310 ASSAY OF CALCIUM: CPT

## 2021-06-04 RX ORDER — EPINEPHRINE 1 MG/ML
0.3 INJECTION, SOLUTION, CONCENTRATE INTRAVENOUS PRN
Status: CANCELLED | OUTPATIENT
Start: 2021-12-03

## 2021-06-04 RX ORDER — METHYLPREDNISOLONE SODIUM SUCCINATE 125 MG/2ML
125 INJECTION, POWDER, LYOPHILIZED, FOR SOLUTION INTRAMUSCULAR; INTRAVENOUS ONCE
Status: CANCELLED | OUTPATIENT
Start: 2021-12-03 | End: 2021-12-03

## 2021-06-04 RX ORDER — SODIUM CHLORIDE 9 MG/ML
INJECTION, SOLUTION INTRAVENOUS CONTINUOUS
Status: CANCELLED | OUTPATIENT
Start: 2021-12-03

## 2021-06-04 RX ORDER — DIPHENHYDRAMINE HYDROCHLORIDE 50 MG/ML
50 INJECTION INTRAMUSCULAR; INTRAVENOUS ONCE
Status: CANCELLED | OUTPATIENT
Start: 2021-12-03 | End: 2021-12-03

## 2021-06-04 RX ADMIN — DENOSUMAB 60 MG: 60 INJECTION SUBCUTANEOUS at 15:43

## 2021-06-04 NOTE — PROGRESS NOTES
Pt seen this date for prolia injection. VS obtained and labs WNL. Injection given in R arm. Pt tolerated injection well and discharged home with son.

## (undated) DEVICE — NEEDLE SPNL 18GA L3.5IN W/ QNCKE SHARPER BVL DURA CLICK

## (undated) DEVICE — SET HNDPC W COAX BNE CLN TIP SUCT TB BTTRY PWR DISPOSABLE

## (undated) DEVICE — Device

## (undated) DEVICE — POUCH INSTR W6.75XL11.5IN FRST 2 PKT ADH FOR ORTH AND

## (undated) DEVICE — HYPODERMIC SAFETY NEEDLE: Brand: MAGELLAN

## (undated) DEVICE — YANKAUER,SMOOTH HANDLE,HIGH CAPACITY: Brand: MEDLINE INDUSTRIES, INC.

## (undated) DEVICE — DUAL CUT SAGITTAL BLADE

## (undated) DEVICE — DRAPE,REIN 53X77,STERILE: Brand: MEDLINE

## (undated) DEVICE — SUTURE VCRL + SZ 2 L27IN ABSRB UD L40MM CP 1/2 CIR REV CUT VCP195H

## (undated) DEVICE — COVER,TABLE,60X90,STERILE: Brand: MEDLINE

## (undated) DEVICE — Z INACTIVE USE 2660664 SOLUTION IRRIG 3000ML 0.9% SOD CHL USP UROMATIC PLAS CONT

## (undated) DEVICE — GLOVE SURG SZ 8 L12IN FNGR THK13MIL BRN LTX SYN POLYMER W

## (undated) DEVICE — TUBING, SUCTION, 9/32" X 20', STRAIGHT: Brand: MEDLINE INDUSTRIES, INC.

## (undated) DEVICE — DRAPE,U/ SHT,SPLIT,PLAS,STERIL: Brand: MEDLINE

## (undated) DEVICE — COVER,C-ARM,41X74: Brand: MEDLINE

## (undated) DEVICE — STERILE PVP: Brand: MEDLINE INDUSTRIES, INC.

## (undated) DEVICE — BANDAGE COMPR W6INXL5YD SELF ADH COHESIVE CO FLX

## (undated) DEVICE — 3M™ IOBAN™ 2 ANTIMICROBIAL INCISE DRAPE 6651EZ: Brand: IOBAN™ 2

## (undated) DEVICE — GLOVE SURG SZ 8 L12IN FNGR THK79MIL GRN LTX FREE

## (undated) DEVICE — 3M™ IOBAN™ 2 ANTIMICROBIAL INCISE DRAPE 6650EZ: Brand: IOBAN™ 2

## (undated) DEVICE — ELECTROSURGICAL PENCIL BUTTON SWITCH E-Z CLEAN COATED BLADE ELECTRODE 10 FT (3 M) CORD HOLSTER: Brand: MEGADYNE

## (undated) DEVICE — SYRINGE MED 50ML LUERLOCK TIP

## (undated) DEVICE — SYRINGE, LUER LOCK, 10ML: Brand: MEDLINE

## (undated) DEVICE — STRAP,POSITIONING,KNEE/BODY,FOAM,4X60": Brand: MEDLINE

## (undated) DEVICE — SUTURE VCRL + SZ 2-0 L27IN ABSRB UD CP-1 1/2 CIR REV CUT VCP266H

## (undated) DEVICE — 3M™ STERI-DRAPE™ U-DRAPE 1015: Brand: STERI-DRAPE™

## (undated) DEVICE — COVER,MAYO STAND,STERILE: Brand: MEDLINE

## (undated) DEVICE — 3M™ WARMING BLANKET, UPPER BODY, 10 PER CASE, 42268: Brand: BAIR HUGGER™

## (undated) DEVICE — NEEDLE SPNL L3.5IN PNK HUB S STL REG WALL FIT STYL W/ QNCKE

## (undated) DEVICE — STOCKINETTE,IMPERVIOUS,12X48,STERILE: Brand: MEDLINE